# Patient Record
Sex: MALE | Race: WHITE | NOT HISPANIC OR LATINO | URBAN - METROPOLITAN AREA
[De-identification: names, ages, dates, MRNs, and addresses within clinical notes are randomized per-mention and may not be internally consistent; named-entity substitution may affect disease eponyms.]

---

## 2021-10-18 ENCOUNTER — INPATIENT (INPATIENT)
Facility: HOSPITAL | Age: 81
LOS: 6 days | Discharge: HOME HEALTH | DRG: 872 | End: 2021-10-25
Attending: FAMILY MEDICINE | Admitting: FAMILY MEDICINE
Payer: MEDICARE

## 2021-10-18 VITALS
HEIGHT: 69 IN | TEMPERATURE: 100 F | SYSTOLIC BLOOD PRESSURE: 127 MMHG | RESPIRATION RATE: 18 BRPM | HEART RATE: 94 BPM | DIASTOLIC BLOOD PRESSURE: 79 MMHG | OXYGEN SATURATION: 98 % | WEIGHT: 169.98 LBS

## 2021-10-18 LAB
ALBUMIN SERPL ELPH-MCNC: 3.5 G/DL — SIGNIFICANT CHANGE UP (ref 3.4–5)
ALP SERPL-CCNC: 65 U/L — SIGNIFICANT CHANGE UP (ref 40–120)
ALT FLD-CCNC: 23 U/L — SIGNIFICANT CHANGE UP (ref 12–42)
ANION GAP SERPL CALC-SCNC: 7 MMOL/L — LOW (ref 9–16)
APPEARANCE UR: CLEAR — SIGNIFICANT CHANGE UP
APTT BLD: 37.4 SEC — HIGH (ref 27.5–35.5)
AST SERPL-CCNC: 19 U/L — SIGNIFICANT CHANGE UP (ref 15–37)
B PERT IGG+IGM PNL SER: SIGNIFICANT CHANGE UP
BACTERIA # UR AUTO: PRESENT /HPF
BASOPHILS # BLD AUTO: 0.03 K/UL — SIGNIFICANT CHANGE UP (ref 0–0.2)
BASOPHILS NFR BLD AUTO: 0.3 % — SIGNIFICANT CHANGE UP (ref 0–2)
BILIRUB SERPL-MCNC: 1.5 MG/DL — HIGH (ref 0.2–1.2)
BILIRUB UR-MCNC: NEGATIVE — SIGNIFICANT CHANGE UP
BUN SERPL-MCNC: 25 MG/DL — HIGH (ref 7–23)
CALCIUM SERPL-MCNC: 9.4 MG/DL — SIGNIFICANT CHANGE UP (ref 8.5–10.5)
CHLORIDE SERPL-SCNC: 104 MMOL/L — SIGNIFICANT CHANGE UP (ref 96–108)
CK SERPL-CCNC: 53 U/L — SIGNIFICANT CHANGE UP (ref 39–308)
CO2 SERPL-SCNC: 27 MMOL/L — SIGNIFICANT CHANGE UP (ref 22–31)
COLOR FLD: YELLOW — SIGNIFICANT CHANGE UP
COLOR SPEC: YELLOW — SIGNIFICANT CHANGE UP
CREAT SERPL-MCNC: 1.5 MG/DL — HIGH (ref 0.5–1.3)
CRP SERPL-MCNC: 120 MG/L — HIGH (ref 0–9)
DIFF PNL FLD: NEGATIVE — SIGNIFICANT CHANGE UP
EOSINOPHIL # BLD AUTO: 0.08 K/UL — SIGNIFICANT CHANGE UP (ref 0–0.5)
EOSINOPHIL NFR BLD AUTO: 0.7 % — SIGNIFICANT CHANGE UP (ref 0–6)
EPI CELLS # UR: SIGNIFICANT CHANGE UP /HPF (ref 0–5)
FLUID INTAKE SUBSTANCE CLASS: SIGNIFICANT CHANGE UP
FLUID SEGMENTED GRANULOCYTES: 100 % — SIGNIFICANT CHANGE UP
GLUCOSE SERPL-MCNC: 113 MG/DL — HIGH (ref 70–99)
GLUCOSE UR QL: NEGATIVE — SIGNIFICANT CHANGE UP
GRAM STN FLD: SIGNIFICANT CHANGE UP
HCT VFR BLD CALC: 30.3 % — LOW (ref 39–50)
HGB BLD-MCNC: 10 G/DL — LOW (ref 13–17)
IMM GRANULOCYTES NFR BLD AUTO: 0.6 % — SIGNIFICANT CHANGE UP (ref 0–1.5)
INR BLD: 1.8 — HIGH (ref 0.88–1.16)
KETONES UR-MCNC: NEGATIVE — SIGNIFICANT CHANGE UP
LACTATE SERPL-SCNC: 0.9 MMOL/L — SIGNIFICANT CHANGE UP (ref 0.4–2)
LEUKOCYTE ESTERASE UR-ACNC: NEGATIVE — SIGNIFICANT CHANGE UP
LYMPHOCYTES # BLD AUTO: 0.55 K/UL — LOW (ref 1–3.3)
LYMPHOCYTES # BLD AUTO: 5 % — LOW (ref 13–44)
MAGNESIUM SERPL-MCNC: 2 MG/DL — SIGNIFICANT CHANGE UP (ref 1.6–2.6)
MCHC RBC-ENTMCNC: 29 PG — SIGNIFICANT CHANGE UP (ref 27–34)
MCHC RBC-ENTMCNC: 33 GM/DL — SIGNIFICANT CHANGE UP (ref 32–36)
MCV RBC AUTO: 87.8 FL — SIGNIFICANT CHANGE UP (ref 80–100)
MONOCYTES # BLD AUTO: 0.71 K/UL — SIGNIFICANT CHANGE UP (ref 0–0.9)
MONOCYTES NFR BLD AUTO: 6.5 % — SIGNIFICANT CHANGE UP (ref 2–14)
NEUTROPHILS # BLD AUTO: 9.48 K/UL — HIGH (ref 1.8–7.4)
NEUTROPHILS NFR BLD AUTO: 86.9 % — HIGH (ref 43–77)
NITRITE UR-MCNC: NEGATIVE — SIGNIFICANT CHANGE UP
NRBC # BLD: 0 /100 WBCS — SIGNIFICANT CHANGE UP (ref 0–0)
PH UR: 7 — SIGNIFICANT CHANGE UP (ref 5–8)
PLATELET # BLD AUTO: 147 K/UL — LOW (ref 150–400)
POTASSIUM SERPL-MCNC: 3.8 MMOL/L — SIGNIFICANT CHANGE UP (ref 3.5–5.3)
POTASSIUM SERPL-SCNC: 3.8 MMOL/L — SIGNIFICANT CHANGE UP (ref 3.5–5.3)
PROT SERPL-MCNC: 7.7 G/DL — SIGNIFICANT CHANGE UP (ref 6.4–8.2)
PROT UR-MCNC: ABNORMAL MG/DL
PROTHROM AB SERPL-ACNC: 21 SEC — HIGH (ref 10.6–13.6)
RBC # BLD: 3.45 M/UL — LOW (ref 4.2–5.8)
RBC # FLD: 15.7 % — HIGH (ref 10.3–14.5)
RBC CASTS # UR COMP ASSIST: < 5 /HPF — SIGNIFICANT CHANGE UP
RCV VOL RI: 1000 /UL — HIGH (ref 0–0)
SARS-COV-2 RNA SPEC QL NAA+PROBE: SIGNIFICANT CHANGE UP
SODIUM SERPL-SCNC: 138 MMOL/L — SIGNIFICANT CHANGE UP (ref 132–145)
SP GR SPEC: 1.01 — SIGNIFICANT CHANGE UP (ref 1–1.03)
SPECIMEN SOURCE: SIGNIFICANT CHANGE UP
SYNOVIAL CRYSTALS CLARITY: SIGNIFICANT CHANGE UP
SYNOVIAL CRYSTALS COLOR: YELLOW
SYNOVIAL CRYSTALS ID: SIGNIFICANT CHANGE UP
SYNOVIAL CRYSTALS TUBE: SIGNIFICANT CHANGE UP
TOTAL NUCLEATED CELL COUNT, BODY FLUID: SIGNIFICANT CHANGE UP /UL
TUBE TYPE: SIGNIFICANT CHANGE UP
UROBILINOGEN FLD QL: 0.2 E.U./DL — SIGNIFICANT CHANGE UP
WBC # BLD: 10.92 K/UL — HIGH (ref 3.8–10.5)
WBC # FLD AUTO: 10.92 K/UL — HIGH (ref 3.8–10.5)
WBC UR QL: < 5 /HPF — SIGNIFICANT CHANGE UP

## 2021-10-18 PROCEDURE — 99285 EMERGENCY DEPT VISIT HI MDM: CPT | Mod: 25

## 2021-10-18 PROCEDURE — 71045 X-RAY EXAM CHEST 1 VIEW: CPT | Mod: 26

## 2021-10-18 PROCEDURE — 20610 DRAIN/INJ JOINT/BURSA W/O US: CPT

## 2021-10-18 PROCEDURE — 93971 EXTREMITY STUDY: CPT | Mod: 26,RT

## 2021-10-18 PROCEDURE — 73560 X-RAY EXAM OF KNEE 1 OR 2: CPT | Mod: 26,RT

## 2021-10-18 RX ORDER — SODIUM CHLORIDE 9 MG/ML
2100 INJECTION INTRAMUSCULAR; INTRAVENOUS; SUBCUTANEOUS ONCE
Refills: 0 | Status: COMPLETED | OUTPATIENT
Start: 2021-10-18 | End: 2021-10-18

## 2021-10-18 RX ORDER — ACETAMINOPHEN 500 MG
975 TABLET ORAL ONCE
Refills: 0 | Status: COMPLETED | OUTPATIENT
Start: 2021-10-18 | End: 2021-10-18

## 2021-10-18 RX ORDER — PIPERACILLIN AND TAZOBACTAM 4; .5 G/20ML; G/20ML
3.38 INJECTION, POWDER, LYOPHILIZED, FOR SOLUTION INTRAVENOUS ONCE
Refills: 0 | Status: COMPLETED | OUTPATIENT
Start: 2021-10-18 | End: 2021-10-18

## 2021-10-18 RX ORDER — MORPHINE SULFATE 50 MG/1
2 CAPSULE, EXTENDED RELEASE ORAL ONCE
Refills: 0 | Status: DISCONTINUED | OUTPATIENT
Start: 2021-10-18 | End: 2021-10-18

## 2021-10-18 RX ORDER — WARFARIN SODIUM 2.5 MG/1
5 TABLET ORAL
Qty: 0 | Refills: 0 | DISCHARGE

## 2021-10-18 RX ADMIN — MORPHINE SULFATE 2 MILLIGRAM(S): 50 CAPSULE, EXTENDED RELEASE ORAL at 23:32

## 2021-10-18 RX ADMIN — SODIUM CHLORIDE 525 MILLILITER(S): 9 INJECTION INTRAMUSCULAR; INTRAVENOUS; SUBCUTANEOUS at 15:49

## 2021-10-18 RX ADMIN — MORPHINE SULFATE 2 MILLIGRAM(S): 50 CAPSULE, EXTENDED RELEASE ORAL at 15:49

## 2021-10-18 RX ADMIN — PIPERACILLIN AND TAZOBACTAM 200 GRAM(S): 4; .5 INJECTION, POWDER, LYOPHILIZED, FOR SOLUTION INTRAVENOUS at 15:47

## 2021-10-18 RX ADMIN — Medication 975 MILLIGRAM(S): at 15:46

## 2021-10-18 NOTE — ED PROVIDER NOTE - PHYSICAL EXAMINATION
Physical Exam  GEN: Awake, alert, non-toxic appearing,  EYES: full EOMI, R eye lateral gaze deviation at rest, no chemosis   ENT: External inspection normal, normal voice,   HEAD: atraumatic  NECK: FROM neck, supple,   CV: rrr  RESP: cta bl, no tachypnea, no hypoxia, no resp distress,  MSK: joint effusion noted to R knee, reduced a/p ROM to R knee, soft compartment of RLE, no calf tenderness/swelling  SKIN: no erythema/petechiae/fluctuance/induration over RLE,

## 2021-10-18 NOTE — ED ADULT NURSE NOTE - OBJECTIVE STATEMENT
Patient to ED  "feeling cold" since yesterday, and noted right leg pain today.  pt reports he exercises both of his legs several times a week (described as step up to a height, then stand on toes for 50 seconds), he had noticed increased leg pain after exercising recently but has not changed his regimen.  triage noted urinary incontinence but pt reports he was not able to make it to the bathroom due to leg pain today

## 2021-10-18 NOTE — ED PROVIDER NOTE - CLINICAL SUMMARY MEDICAL DECISION MAKING FREE TEXT BOX
fever 101.8, unable to range R knee w/ pain, no overlying cellulitis, no other obvious sources of infection based on history, will check rvp, US r/o DVT, labs, abx, though pt w/ hx of gout, given fever and significantly limited ROM, will perform arthrocentesis to r/o septic joint

## 2021-10-18 NOTE — ED PROVIDER NOTE - PROGRESS NOTE DETAILS
noted elevated INR, platelet minimally decreased, I reviewed labs and case w/ ortho on call, rec'd ED to perform arthrocentesis to r/o septic joint US guided arthrocentesis performed.  spoke w/ lab, ok to send to lab in syringe collected.  specimen dropped off at lab myself.  requested stat pickup to send to [facility] pt resting comfortably.  pending labs to r/o septic joint called [facility] to discuss case w/ orthopedics case discussed w/ orthopedics, labs reviewed, does not believe pt has septic joint.    will admit pt to medicine for pain/fever workup, can re-consult orthopedics as needed

## 2021-10-18 NOTE — ED PROVIDER NOTE - CARE PLAN
1 Principal Discharge DX:	Fever  Secondary Diagnosis:	Knee effusion, right  Secondary Diagnosis:	Right knee pain

## 2021-10-18 NOTE — ED ADULT TRIAGE NOTE - CHIEF COMPLAINT QUOTE
BIBA from [facility] with c/o right knee pain, inability to bear weight and episodes of urinary incontinence. Pt has mobility issues at baseline, no fall or associated injury. mild fever in triage.

## 2021-10-18 NOTE — ED ADULT NURSE NOTE - CHIEF COMPLAINT QUOTE
BIBA from [facility] with c/o right knee pain, inability to bear weight and episodes of urinary incontinence. Pt has mobility issues at baseline, no fall or associated injury. mild fever in triage.

## 2021-10-18 NOTE — ED PROVIDER NOTE - OBJECTIVE STATEMENT
81 yom pw "feeling cold" since yesterday, and noted right leg pain today.  pt reports he exercises both of his legs several times a week (described as step up to a height, then stand on toes for 50 seconds), he had noticed increased leg pain after exercising recently but has not changed his regimen.  triage noted urinary incontinence but pt reports he was not able to make it to the bathroom due to leg pain today.  pt denies any other strenuous physical activities.  no other trauma.  no cough/sob/nv/abd pain/fall. 81 yom pw "feeling cold" since yesterday, and noted right leg pain today.  pt reports he exercises both of his legs several times a week (described as step up to a height, then stand on toes for 50 seconds), he had noticed increased leg pain after exercising recently but has not changed his regimen.  triage noted urinary incontinence but pt reports he was not able to make it to the bathroom due to leg pain today.  pt denies any other strenuous physical activities.  no other trauma.  no cough/sob/nv/abd pain/fall.  pt reports hx of gout but has ran out of his medications for a few weeks.  reports previous gout attacks in various joints but unable to recall exactly where/when.

## 2021-10-18 NOTE — ED PROCEDURE NOTE - PROCEDURE ADDITIONAL DETAILS
Sterile technique used  US used to visualize the largest pocket of effusion  18gauge needle inserted w/ aspiration    after procedure ace wrap applied to right knee

## 2021-10-19 ENCOUNTER — TRANSCRIPTION ENCOUNTER (OUTPATIENT)
Age: 81
End: 2021-10-19

## 2021-10-19 DIAGNOSIS — R65.10 SYSTEMIC INFLAMMATORY RESPONSE SYNDROME (SIRS) OF NON-INFECTIOUS ORIGIN WITHOUT ACUTE ORGAN DYSFUNCTION: ICD-10-CM

## 2021-10-19 DIAGNOSIS — D64.9 ANEMIA, UNSPECIFIED: ICD-10-CM

## 2021-10-19 DIAGNOSIS — E78.5 HYPERLIPIDEMIA, UNSPECIFIED: ICD-10-CM

## 2021-10-19 DIAGNOSIS — I48.91 UNSPECIFIED ATRIAL FIBRILLATION: ICD-10-CM

## 2021-10-19 DIAGNOSIS — Z95.2 PRESENCE OF PROSTHETIC HEART VALVE: ICD-10-CM

## 2021-10-19 DIAGNOSIS — Z90.5 ACQUIRED ABSENCE OF KIDNEY: Chronic | ICD-10-CM

## 2021-10-19 DIAGNOSIS — R17 UNSPECIFIED JAUNDICE: ICD-10-CM

## 2021-10-19 DIAGNOSIS — Z76.89 PERSONS ENCOUNTERING HEALTH SERVICES IN OTHER SPECIFIED CIRCUMSTANCES: ICD-10-CM

## 2021-10-19 DIAGNOSIS — Z98.890 OTHER SPECIFIED POSTPROCEDURAL STATES: Chronic | ICD-10-CM

## 2021-10-19 DIAGNOSIS — R63.8 OTHER SYMPTOMS AND SIGNS CONCERNING FOOD AND FLUID INTAKE: ICD-10-CM

## 2021-10-19 DIAGNOSIS — F32.9 MAJOR DEPRESSIVE DISORDER, SINGLE EPISODE, UNSPECIFIED: ICD-10-CM

## 2021-10-19 DIAGNOSIS — M10.9 GOUT, UNSPECIFIED: ICD-10-CM

## 2021-10-19 DIAGNOSIS — M25.561 PAIN IN RIGHT KNEE: ICD-10-CM

## 2021-10-19 DIAGNOSIS — N40.0 BENIGN PROSTATIC HYPERPLASIA WITHOUT LOWER URINARY TRACT SYMPTOMS: ICD-10-CM

## 2021-10-19 DIAGNOSIS — R79.89 OTHER SPECIFIED ABNORMAL FINDINGS OF BLOOD CHEMISTRY: ICD-10-CM

## 2021-10-19 DIAGNOSIS — I10 ESSENTIAL (PRIMARY) HYPERTENSION: ICD-10-CM

## 2021-10-19 LAB
ALBUMIN SERPL ELPH-MCNC: 3.7 G/DL — SIGNIFICANT CHANGE UP (ref 3.3–5)
ALP SERPL-CCNC: 56 U/L — SIGNIFICANT CHANGE UP (ref 40–120)
ALT FLD-CCNC: 12 U/L — SIGNIFICANT CHANGE UP (ref 10–45)
ANION GAP SERPL CALC-SCNC: 10 MMOL/L — SIGNIFICANT CHANGE UP (ref 5–17)
ANISOCYTOSIS BLD QL: SLIGHT — SIGNIFICANT CHANGE UP
APTT BLD: 73.2 SEC — HIGH (ref 27.5–35.5)
APTT BLD: 78 SEC — HIGH (ref 27.5–35.5)
AST SERPL-CCNC: 21 U/L — SIGNIFICANT CHANGE UP (ref 10–40)
BASOPHILS # BLD AUTO: 0 K/UL — SIGNIFICANT CHANGE UP (ref 0–0.2)
BASOPHILS NFR BLD AUTO: 0 % — SIGNIFICANT CHANGE UP (ref 0–2)
BILIRUB DIRECT SERPL-MCNC: 0.5 MG/DL — HIGH (ref 0–0.2)
BILIRUB INDIRECT FLD-MCNC: 0.7 MG/DL — SIGNIFICANT CHANGE UP (ref 0.2–1)
BILIRUB SERPL-MCNC: 1.2 MG/DL — SIGNIFICANT CHANGE UP (ref 0.2–1.2)
BILIRUB SERPL-MCNC: 1.2 MG/DL — SIGNIFICANT CHANGE UP (ref 0.2–1.2)
BLD GP AB SCN SERPL QL: NEGATIVE — SIGNIFICANT CHANGE UP
BUN SERPL-MCNC: 22 MG/DL — SIGNIFICANT CHANGE UP (ref 7–23)
CALCIUM SERPL-MCNC: 9 MG/DL — SIGNIFICANT CHANGE UP (ref 8.4–10.5)
CHLORIDE SERPL-SCNC: 104 MMOL/L — SIGNIFICANT CHANGE UP (ref 96–108)
CHOLEST SERPL-MCNC: 104 MG/DL — SIGNIFICANT CHANGE UP
CO2 SERPL-SCNC: 22 MMOL/L — SIGNIFICANT CHANGE UP (ref 22–31)
COVID-19 SPIKE DOMAIN AB INTERP: POSITIVE
COVID-19 SPIKE DOMAIN ANTIBODY RESULT: >250 U/ML — HIGH
CREAT SERPL-MCNC: 1.38 MG/DL — HIGH (ref 0.5–1.3)
CULTURE RESULTS: SIGNIFICANT CHANGE UP
DIGOXIN SERPL-MCNC: 0.9 NG/ML — SIGNIFICANT CHANGE UP (ref 0.8–2)
EOSINOPHIL # BLD AUTO: 0.17 K/UL — SIGNIFICANT CHANGE UP (ref 0–0.5)
EOSINOPHIL NFR BLD AUTO: 1.8 % — SIGNIFICANT CHANGE UP (ref 0–6)
ERYTHROCYTE [SEDIMENTATION RATE] IN BLOOD: 61 MM/HR — HIGH (ref 0–20)
FERRITIN SERPL-MCNC: 122 NG/ML — SIGNIFICANT CHANGE UP (ref 30–400)
FOLATE SERPL-MCNC: 10.9 NG/ML — SIGNIFICANT CHANGE UP
GIANT PLATELETS BLD QL SMEAR: PRESENT — SIGNIFICANT CHANGE UP
GLUCOSE SERPL-MCNC: 140 MG/DL — HIGH (ref 70–99)
HAV IGM SER-ACNC: SIGNIFICANT CHANGE UP
HBV CORE AB SER-ACNC: SIGNIFICANT CHANGE UP
HBV CORE IGM SER-ACNC: SIGNIFICANT CHANGE UP
HBV SURFACE AB SER-ACNC: SIGNIFICANT CHANGE UP
HBV SURFACE AG SER-ACNC: SIGNIFICANT CHANGE UP
HCT VFR BLD CALC: 30.2 % — LOW (ref 39–50)
HCV AB S/CO SERPL IA: 0.04 S/CO — SIGNIFICANT CHANGE UP
HCV AB SERPL-IMP: SIGNIFICANT CHANGE UP
HDLC SERPL-MCNC: 42 MG/DL — SIGNIFICANT CHANGE UP
HGB BLD-MCNC: 9.5 G/DL — LOW (ref 13–17)
HIV 1+2 AB+HIV1 P24 AG SERPL QL IA: SIGNIFICANT CHANGE UP
HYPOCHROMIA BLD QL: SLIGHT — SIGNIFICANT CHANGE UP
INR BLD: 1.67 — HIGH (ref 0.88–1.16)
IRON SATN MFR SERPL: 20 UG/DL — LOW (ref 45–165)
IRON SATN MFR SERPL: 6 % — LOW (ref 16–55)
LIPID PNL WITH DIRECT LDL SERPL: 47 MG/DL — SIGNIFICANT CHANGE UP
LYMPHOCYTES # BLD AUTO: 0.25 K/UL — LOW (ref 1–3.3)
LYMPHOCYTES # BLD AUTO: 2.6 % — LOW (ref 13–44)
MACROCYTES BLD QL: SLIGHT — SIGNIFICANT CHANGE UP
MAGNESIUM SERPL-MCNC: 1.8 MG/DL — SIGNIFICANT CHANGE UP (ref 1.6–2.6)
MANUAL SMEAR VERIFICATION: SIGNIFICANT CHANGE UP
MCHC RBC-ENTMCNC: 27.9 PG — SIGNIFICANT CHANGE UP (ref 27–34)
MCHC RBC-ENTMCNC: 31.5 GM/DL — LOW (ref 32–36)
MCV RBC AUTO: 88.6 FL — SIGNIFICANT CHANGE UP (ref 80–100)
MICROCYTES BLD QL: SLIGHT — SIGNIFICANT CHANGE UP
MONOCYTES # BLD AUTO: 0.17 K/UL — SIGNIFICANT CHANGE UP (ref 0–0.9)
MONOCYTES NFR BLD AUTO: 1.8 % — LOW (ref 2–14)
NEUTROPHILS # BLD AUTO: 8.97 K/UL — HIGH (ref 1.8–7.4)
NEUTROPHILS NFR BLD AUTO: 93.8 % — HIGH (ref 43–77)
NON HDL CHOLESTEROL: 62 MG/DL — SIGNIFICANT CHANGE UP
NT-PROBNP SERPL-SCNC: 1861 PG/ML — HIGH (ref 0–300)
OVALOCYTES BLD QL SMEAR: SLIGHT — SIGNIFICANT CHANGE UP
PHOSPHATE SERPL-MCNC: 2.4 MG/DL — LOW (ref 2.5–4.5)
PLAT MORPH BLD: ABNORMAL
PLATELET # BLD AUTO: 145 K/UL — LOW (ref 150–400)
POLYCHROMASIA BLD QL SMEAR: SLIGHT — SIGNIFICANT CHANGE UP
POTASSIUM SERPL-MCNC: 3.7 MMOL/L — SIGNIFICANT CHANGE UP (ref 3.5–5.3)
POTASSIUM SERPL-SCNC: 3.7 MMOL/L — SIGNIFICANT CHANGE UP (ref 3.5–5.3)
PROT SERPL-MCNC: 7.4 G/DL — SIGNIFICANT CHANGE UP (ref 6–8.3)
PROTHROM AB SERPL-ACNC: 19.6 SEC — HIGH (ref 10.6–13.6)
RBC # BLD: 3.41 M/UL — LOW (ref 4.2–5.8)
RBC # FLD: 15.8 % — HIGH (ref 10.3–14.5)
RBC BLD AUTO: ABNORMAL
RH IG SCN BLD-IMP: POSITIVE — SIGNIFICANT CHANGE UP
SARS-COV-2 IGG+IGM SERPL QL IA: >250 U/ML — HIGH
SARS-COV-2 IGG+IGM SERPL QL IA: POSITIVE
SODIUM SERPL-SCNC: 136 MMOL/L — SIGNIFICANT CHANGE UP (ref 135–145)
SPECIMEN SOURCE: SIGNIFICANT CHANGE UP
SPHEROCYTES BLD QL SMEAR: SLIGHT — SIGNIFICANT CHANGE UP
T PALLIDUM AB TITR SER: NEGATIVE — SIGNIFICANT CHANGE UP
TIBC SERPL-MCNC: 361 UG/DL — SIGNIFICANT CHANGE UP (ref 220–430)
TRANSFERRIN SERPL-MCNC: 301 MG/DL — SIGNIFICANT CHANGE UP (ref 200–360)
TRIGL SERPL-MCNC: 76 MG/DL — SIGNIFICANT CHANGE UP
TROPONIN T SERPL-MCNC: 0.02 NG/ML — HIGH (ref 0–0.01)
TROPONIN T SERPL-MCNC: 0.02 NG/ML — HIGH (ref 0–0.01)
TSH SERPL-MCNC: 0.99 UIU/ML — SIGNIFICANT CHANGE UP (ref 0.27–4.2)
UIBC SERPL-MCNC: 341 UG/DL — SIGNIFICANT CHANGE UP (ref 110–370)
URATE SERPL-MCNC: 6.4 MG/DL — SIGNIFICANT CHANGE UP (ref 3.4–8.8)
VIT B12 SERPL-MCNC: 281 PG/ML — SIGNIFICANT CHANGE UP (ref 232–1245)
WBC # BLD: 9.56 K/UL — SIGNIFICANT CHANGE UP (ref 3.8–10.5)
WBC # FLD AUTO: 9.56 K/UL — SIGNIFICANT CHANGE UP (ref 3.8–10.5)

## 2021-10-19 PROCEDURE — 71045 X-RAY EXAM CHEST 1 VIEW: CPT | Mod: 26

## 2021-10-19 PROCEDURE — 93010 ELECTROCARDIOGRAM REPORT: CPT

## 2021-10-19 PROCEDURE — 99222 1ST HOSP IP/OBS MODERATE 55: CPT

## 2021-10-19 PROCEDURE — 76700 US EXAM ABDOM COMPLETE: CPT | Mod: 26

## 2021-10-19 PROCEDURE — 99223 1ST HOSP IP/OBS HIGH 75: CPT | Mod: GC

## 2021-10-19 RX ORDER — VANCOMYCIN HCL 1 G
1000 VIAL (EA) INTRAVENOUS EVERY 24 HOURS
Refills: 0 | Status: COMPLETED | OUTPATIENT
Start: 2021-10-19 | End: 2021-10-21

## 2021-10-19 RX ORDER — PIPERACILLIN AND TAZOBACTAM 4; .5 G/20ML; G/20ML
3.38 INJECTION, POWDER, LYOPHILIZED, FOR SOLUTION INTRAVENOUS ONCE
Refills: 0 | Status: COMPLETED | OUTPATIENT
Start: 2021-10-19 | End: 2021-10-19

## 2021-10-19 RX ORDER — HEPARIN SODIUM 5000 [USP'U]/ML
1400 INJECTION INTRAVENOUS; SUBCUTANEOUS
Qty: 25000 | Refills: 0 | Status: DISCONTINUED | OUTPATIENT
Start: 2021-10-19 | End: 2021-10-22

## 2021-10-19 RX ORDER — SODIUM,POTASSIUM PHOSPHATES 278-250MG
1 POWDER IN PACKET (EA) ORAL
Refills: 0 | Status: COMPLETED | OUTPATIENT
Start: 2021-10-19 | End: 2021-10-20

## 2021-10-19 RX ORDER — IPRATROPIUM/ALBUTEROL SULFATE 18-103MCG
3 AEROSOL WITH ADAPTER (GRAM) INHALATION ONCE
Refills: 0 | Status: COMPLETED | OUTPATIENT
Start: 2021-10-19 | End: 2021-10-19

## 2021-10-19 RX ORDER — CARVEDILOL PHOSPHATE 80 MG/1
6.25 CAPSULE, EXTENDED RELEASE ORAL EVERY 12 HOURS
Refills: 0 | Status: DISCONTINUED | OUTPATIENT
Start: 2021-10-19 | End: 2021-10-19

## 2021-10-19 RX ORDER — SIMVASTATIN 20 MG/1
10 TABLET, FILM COATED ORAL AT BEDTIME
Refills: 0 | Status: DISCONTINUED | OUTPATIENT
Start: 2021-10-19 | End: 2021-10-25

## 2021-10-19 RX ORDER — ACETAMINOPHEN 500 MG
650 TABLET ORAL ONCE
Refills: 0 | Status: COMPLETED | OUTPATIENT
Start: 2021-10-19 | End: 2021-10-19

## 2021-10-19 RX ORDER — TAMSULOSIN HYDROCHLORIDE 0.4 MG/1
0.4 CAPSULE ORAL AT BEDTIME
Refills: 0 | Status: DISCONTINUED | OUTPATIENT
Start: 2021-10-19 | End: 2021-10-25

## 2021-10-19 RX ORDER — POLYETHYLENE GLYCOL 3350 17 G/17G
17 POWDER, FOR SOLUTION ORAL
Refills: 0 | Status: DISCONTINUED | OUTPATIENT
Start: 2021-10-19 | End: 2021-10-25

## 2021-10-19 RX ORDER — ACETAMINOPHEN 500 MG
650 TABLET ORAL EVERY 6 HOURS
Refills: 0 | Status: DISCONTINUED | OUTPATIENT
Start: 2021-10-19 | End: 2021-10-25

## 2021-10-19 RX ORDER — BUPROPION HYDROCHLORIDE 150 MG/1
300 TABLET, EXTENDED RELEASE ORAL DAILY
Refills: 0 | Status: DISCONTINUED | OUTPATIENT
Start: 2021-10-19 | End: 2021-10-25

## 2021-10-19 RX ORDER — FUROSEMIDE 40 MG
1 TABLET ORAL
Qty: 0 | Refills: 0 | DISCHARGE

## 2021-10-19 RX ORDER — HEPARIN SODIUM 5000 [USP'U]/ML
6500 INJECTION INTRAVENOUS; SUBCUTANEOUS ONCE
Refills: 0 | Status: COMPLETED | OUTPATIENT
Start: 2021-10-19 | End: 2021-10-19

## 2021-10-19 RX ORDER — OXYCODONE HYDROCHLORIDE 5 MG/1
5 TABLET ORAL EVERY 4 HOURS
Refills: 0 | Status: DISCONTINUED | OUTPATIENT
Start: 2021-10-19 | End: 2021-10-25

## 2021-10-19 RX ORDER — DIPHENHYDRAMINE HCL 50 MG
25 CAPSULE ORAL ONCE
Refills: 0 | Status: COMPLETED | OUTPATIENT
Start: 2021-10-19 | End: 2021-10-19

## 2021-10-19 RX ORDER — INFLUENZA VIRUS VACCINE 15; 15; 15; 15 UG/.5ML; UG/.5ML; UG/.5ML; UG/.5ML
0.7 SUSPENSION INTRAMUSCULAR ONCE
Refills: 0 | Status: COMPLETED | OUTPATIENT
Start: 2021-10-19 | End: 2021-10-22

## 2021-10-19 RX ORDER — FENOFIBRATE,MICRONIZED 130 MG
145 CAPSULE ORAL DAILY
Refills: 0 | Status: DISCONTINUED | OUTPATIENT
Start: 2021-10-19 | End: 2021-10-25

## 2021-10-19 RX ORDER — ALLOPURINOL 300 MG
300 TABLET ORAL DAILY
Refills: 0 | Status: DISCONTINUED | OUTPATIENT
Start: 2021-10-19 | End: 2021-10-19

## 2021-10-19 RX ORDER — FUROSEMIDE 40 MG
40 TABLET ORAL ONCE
Refills: 0 | Status: COMPLETED | OUTPATIENT
Start: 2021-10-19 | End: 2021-10-19

## 2021-10-19 RX ORDER — POLYETHYLENE GLYCOL 3350 17 G/17G
527 POWDER, FOR SOLUTION ORAL
Qty: 0 | Refills: 0 | DISCHARGE

## 2021-10-19 RX ORDER — PIPERACILLIN AND TAZOBACTAM 4; .5 G/20ML; G/20ML
3.38 INJECTION, POWDER, LYOPHILIZED, FOR SOLUTION INTRAVENOUS EVERY 6 HOURS
Refills: 0 | Status: DISCONTINUED | OUTPATIENT
Start: 2021-10-19 | End: 2021-10-20

## 2021-10-19 RX ORDER — ALLOPURINOL 300 MG
50 TABLET ORAL DAILY
Refills: 0 | Status: DISCONTINUED | OUTPATIENT
Start: 2021-10-19 | End: 2021-10-25

## 2021-10-19 RX ORDER — DIGOXIN 250 MCG
125 TABLET ORAL DAILY
Refills: 0 | Status: DISCONTINUED | OUTPATIENT
Start: 2021-10-19 | End: 2021-10-25

## 2021-10-19 RX ORDER — WARFARIN SODIUM 2.5 MG/1
5 TABLET ORAL ONCE
Refills: 0 | Status: DISCONTINUED | OUTPATIENT
Start: 2021-10-19 | End: 2021-10-19

## 2021-10-19 RX ORDER — TRAMADOL HYDROCHLORIDE 50 MG/1
25 TABLET ORAL EVERY 4 HOURS
Refills: 0 | Status: DISCONTINUED | OUTPATIENT
Start: 2021-10-19 | End: 2021-10-25

## 2021-10-19 RX ORDER — ALLOPURINOL 300 MG
1 TABLET ORAL
Qty: 0 | Refills: 0 | DISCHARGE

## 2021-10-19 RX ORDER — INFLUENZA VIRUS VACCINE 15; 15; 15; 15 UG/.5ML; UG/.5ML; UG/.5ML; UG/.5ML
0.5 SUSPENSION INTRAMUSCULAR ONCE
Refills: 0 | Status: DISCONTINUED | OUTPATIENT
Start: 2021-10-19 | End: 2021-10-19

## 2021-10-19 RX ORDER — SENNA PLUS 8.6 MG/1
2 TABLET ORAL AT BEDTIME
Refills: 0 | Status: DISCONTINUED | OUTPATIENT
Start: 2021-10-19 | End: 2021-10-25

## 2021-10-19 RX ADMIN — Medication 145 MILLIGRAM(S): at 13:53

## 2021-10-19 RX ADMIN — Medication 650 MILLIGRAM(S): at 06:52

## 2021-10-19 RX ADMIN — PIPERACILLIN AND TAZOBACTAM 200 GRAM(S): 4; .5 INJECTION, POWDER, LYOPHILIZED, FOR SOLUTION INTRAVENOUS at 13:51

## 2021-10-19 RX ADMIN — OXYCODONE HYDROCHLORIDE 5 MILLIGRAM(S): 5 TABLET ORAL at 06:47

## 2021-10-19 RX ADMIN — PIPERACILLIN AND TAZOBACTAM 200 GRAM(S): 4; .5 INJECTION, POWDER, LYOPHILIZED, FOR SOLUTION INTRAVENOUS at 06:50

## 2021-10-19 RX ADMIN — Medication 650 MILLIGRAM(S): at 13:56

## 2021-10-19 RX ADMIN — Medication 40 MILLIGRAM(S): at 06:50

## 2021-10-19 RX ADMIN — Medication 25 MILLIGRAM(S): at 13:53

## 2021-10-19 RX ADMIN — BUPROPION HYDROCHLORIDE 300 MILLIGRAM(S): 150 TABLET, EXTENDED RELEASE ORAL at 13:52

## 2021-10-19 RX ADMIN — Medication 1 PACKET(S): at 18:43

## 2021-10-19 RX ADMIN — HEPARIN SODIUM 6500 UNIT(S): 5000 INJECTION INTRAVENOUS; SUBCUTANEOUS at 08:00

## 2021-10-19 RX ADMIN — Medication 125 MICROGRAM(S): at 06:48

## 2021-10-19 RX ADMIN — Medication 3 MILLILITER(S): at 14:13

## 2021-10-19 RX ADMIN — Medication 650 MILLIGRAM(S): at 13:52

## 2021-10-19 RX ADMIN — Medication 50 MILLIGRAM(S): at 13:54

## 2021-10-19 RX ADMIN — Medication 650 MILLIGRAM(S): at 08:30

## 2021-10-19 RX ADMIN — TAMSULOSIN HYDROCHLORIDE 0.4 MILLIGRAM(S): 0.4 CAPSULE ORAL at 22:47

## 2021-10-19 RX ADMIN — SIMVASTATIN 10 MILLIGRAM(S): 20 TABLET, FILM COATED ORAL at 22:47

## 2021-10-19 RX ADMIN — Medication 3 MILLILITER(S): at 06:50

## 2021-10-19 RX ADMIN — Medication 250 MILLIGRAM(S): at 09:40

## 2021-10-19 RX ADMIN — Medication 3 MILLILITER(S): at 22:03

## 2021-10-19 RX ADMIN — SENNA PLUS 2 TABLET(S): 8.6 TABLET ORAL at 22:47

## 2021-10-19 RX ADMIN — POLYETHYLENE GLYCOL 3350 17 GRAM(S): 17 POWDER, FOR SOLUTION ORAL at 06:48

## 2021-10-19 RX ADMIN — HEPARIN SODIUM 14 UNIT(S)/HR: 5000 INJECTION INTRAVENOUS; SUBCUTANEOUS at 08:00

## 2021-10-19 RX ADMIN — POLYETHYLENE GLYCOL 3350 17 GRAM(S): 17 POWDER, FOR SOLUTION ORAL at 18:43

## 2021-10-19 RX ADMIN — OXYCODONE HYDROCHLORIDE 5 MILLIGRAM(S): 5 TABLET ORAL at 08:30

## 2021-10-19 RX ADMIN — PIPERACILLIN AND TAZOBACTAM 200 GRAM(S): 4; .5 INJECTION, POWDER, LYOPHILIZED, FOR SOLUTION INTRAVENOUS at 18:43

## 2021-10-19 NOTE — H&P ADULT - ASSESSMENT
81yoM poor historian with PMHx gout, depression, MVR (1994, on warfarin), L nephrectomy 2/2 kidney CA (unclear type), substance abuse, ?HTN, ?Afib, ?HLD, ?BPH presents with R knee pain suspected 2/2 soft tissue injury vs. gout flare.

## 2021-10-19 NOTE — PROGRESS NOTE ADULT - SUBJECTIVE AND OBJECTIVE BOX
CC: Patient is a 81y old  Male who presents with a chief complaint of knee pain      INTERVAL EVENTS: JUDI    SUBJECTIVE / INTERVAL HPI: Patient seen and examined at bedside.     ROS: negative unless otherwise stated above.    VITAL SIGNS:  Vital Signs Last 24 Hrs  T(C): 38.3 (19 Oct 2021 06:14), Max: 38.8 (18 Oct 2021 15:43)  T(F): 100.9 (19 Oct 2021 06:14), Max: 101.8 (18 Oct 2021 15:43)  HR: 94 (19 Oct 2021 06:14) (74 - 95)  BP: 162/80 (19 Oct 2021 06:14) (127/79 - 162/80)  BP(mean): --  RR: 17 (19 Oct 2021 06:14) (16 - 18)  SpO2: 95% (19 Oct 2021 06:14) (94% - 98%)        PHYSICAL EXAM:    General: NAD  HEENT: MMM  Neck: supple  Cardiovascular: +S1/S2; RRR  Respiratory: CTA B/L; no W/R/R  Gastrointestinal: soft, NT/ND  Extremities: WWP; no edema, clubbing or cyanosis  Vascular: 2+ radial, DP/PT pulses B/L  Neurological: AAOx3; no focal deficits    MEDICATIONS:  MEDICATIONS  (STANDING):  allopurinol 50 milliGRAM(s) Oral daily  buPROPion XL (24-Hour) . 300 milliGRAM(s) Oral daily  digoxin     Tablet 125 MICROGram(s) Oral daily  fenofibrate Tablet 145 milliGRAM(s) Oral daily  furosemide   Injectable 40 milliGRAM(s) IV Push once  heparin   Injectable 6500 Unit(s) IV Push once  heparin  Infusion 1400 Unit(s)/Hr (14 mL/Hr) IV Continuous <Continuous>  influenza  Vaccine (HIGH DOSE) 0.7 milliLiter(s) IntraMuscular once  piperacillin/tazobactam IVPB.. 3.375 Gram(s) IV Intermittent every 6 hours  polyethylene glycol 3350 17 Gram(s) Oral two times a day  senna 2 Tablet(s) Oral at bedtime  simvastatin 10 milliGRAM(s) Oral at bedtime  tamsulosin 0.4 milliGRAM(s) Oral at bedtime  vancomycin  IVPB 1000 milliGRAM(s) IV Intermittent every 24 hours    MEDICATIONS  (PRN):  acetaminophen   Tablet .. 650 milliGRAM(s) Oral every 6 hours PRN Temp greater or equal to 38C (100.4F), Mild Pain (1 - 3)  LORazepam   Injectable 1 milliGRAM(s) IV Push every 2 hours PRN CIWA-Ar score increase by 2 points and a total score of 7 or less  oxyCODONE    IR 5 milliGRAM(s) Oral every 4 hours PRN Severe Pain (7 - 10)  traMADol 25 milliGRAM(s) Oral every 4 hours PRN Moderate Pain (4 - 6)      ALLERGIES:  Allergies    No Known Allergies    Intolerances        LABS:                        10.0   10.92 )-----------( 147      ( 18 Oct 2021 15:38 )             30.3     10-18    138  |  104  |  25<H>  ----------------------------<  113<H>  3.8   |  27  |  1.50<H>    Ca    9.4      18 Oct 2021 15:38  Mg     2.0     10-18    TPro  7.7  /  Alb  3.5  /  TBili  1.5<H>  /  DBili  x   /  AST  19  /  ALT  23  /  AlkPhos  65  10-18    PT/INR - ( 18 Oct 2021 15:38 )   PT: 21.0 sec;   INR: 1.80          PTT - ( 18 Oct 2021 15:38 )  PTT:37.4 sec  Urinalysis Basic - ( 18 Oct 2021 15:44 )    Color: Yellow / Appearance: Clear / S.015 / pH: x  Gluc: x / Ketone: NEGATIVE  / Bili: NEGATIVE / Urobili: 0.2 E.U./dL   Blood: x / Protein: Trace mg/dL / Nitrite: NEGATIVE   Leuk Esterase: NEGATIVE / RBC: < 5 /HPF / WBC < 5 /HPF   Sq Epi: x / Non Sq Epi: 0-5 /HPF / Bacteria: Present /HPF      CAPILLARY BLOOD GLUCOSE          RADIOLOGY & ADDITIONAL TESTS: Reviewed. CC: Patient is a 81y old  Male who presents with a chief complaint of knee pain      INTERVAL EVENTS: JUDI      SUBJECTIVE / INTERVAL HPI: Patient seen and examined at bedside.     ROS: negative unless otherwise stated above.    VITAL SIGNS:  Vital Signs Last 24 Hrs  T(C): 38.3 (19 Oct 2021 06:14), Max: 38.8 (18 Oct 2021 15:43)  T(F): 100.9 (19 Oct 2021 06:14), Max: 101.8 (18 Oct 2021 15:43)  HR: 94 (19 Oct 2021 06:14) (74 - 95)  BP: 162/80 (19 Oct 2021 06:14) (127/79 - 162/80)  BP(mean): --  RR: 17 (19 Oct 2021 06:14) (16 - 18)  SpO2: 95% (19 Oct 2021 06:14) (94% - 98%)        PHYSICAL EXAM:    General: NAD  HEENT: MMM  Neck: supple  Cardiovascular: +S1/S2; RRR  Respiratory: CTA B/L; no W/R/R  Gastrointestinal: soft, NT/ND  Extremities: WWP; no edema, clubbing or cyanosis  Vascular: 2+ radial, DP/PT pulses B/L  Neurological: AAOx3; no focal deficits    MEDICATIONS:  MEDICATIONS  (STANDING):  allopurinol 50 milliGRAM(s) Oral daily  buPROPion XL (24-Hour) . 300 milliGRAM(s) Oral daily  digoxin     Tablet 125 MICROGram(s) Oral daily  fenofibrate Tablet 145 milliGRAM(s) Oral daily  furosemide   Injectable 40 milliGRAM(s) IV Push once  heparin   Injectable 6500 Unit(s) IV Push once  heparin  Infusion 1400 Unit(s)/Hr (14 mL/Hr) IV Continuous <Continuous>  influenza  Vaccine (HIGH DOSE) 0.7 milliLiter(s) IntraMuscular once  piperacillin/tazobactam IVPB.. 3.375 Gram(s) IV Intermittent every 6 hours  polyethylene glycol 3350 17 Gram(s) Oral two times a day  senna 2 Tablet(s) Oral at bedtime  simvastatin 10 milliGRAM(s) Oral at bedtime  tamsulosin 0.4 milliGRAM(s) Oral at bedtime  vancomycin  IVPB 1000 milliGRAM(s) IV Intermittent every 24 hours    MEDICATIONS  (PRN):  acetaminophen   Tablet .. 650 milliGRAM(s) Oral every 6 hours PRN Temp greater or equal to 38C (100.4F), Mild Pain (1 - 3)  LORazepam   Injectable 1 milliGRAM(s) IV Push every 2 hours PRN CIWA-Ar score increase by 2 points and a total score of 7 or less  oxyCODONE    IR 5 milliGRAM(s) Oral every 4 hours PRN Severe Pain (7 - 10)  traMADol 25 milliGRAM(s) Oral every 4 hours PRN Moderate Pain (4 - 6)      ALLERGIES:  Allergies    No Known Allergies    Intolerances        LABS:                        10.0   10.92 )-----------( 147      ( 18 Oct 2021 15:38 )             30.3     10-18    138  |  104  |  25<H>  ----------------------------<  113<H>  3.8   |  27  |  1.50<H>    Ca    9.4      18 Oct 2021 15:38  Mg     2.0     10-18    TPro  7.7  /  Alb  3.5  /  TBili  1.5<H>  /  DBili  x   /  AST  19  /  ALT  23  /  AlkPhos  65  10-18    PT/INR - ( 18 Oct 2021 15:38 )   PT: 21.0 sec;   INR: 1.80          PTT - ( 18 Oct 2021 15:38 )  PTT:37.4 sec  Urinalysis Basic - ( 18 Oct 2021 15:44 )    Color: Yellow / Appearance: Clear / S.015 / pH: x  Gluc: x / Ketone: NEGATIVE  / Bili: NEGATIVE / Urobili: 0.2 E.U./dL   Blood: x / Protein: Trace mg/dL / Nitrite: NEGATIVE   Leuk Esterase: NEGATIVE / RBC: < 5 /HPF / WBC < 5 /HPF   Sq Epi: x / Non Sq Epi: 0-5 /HPF / Bacteria: Present /HPF      CAPILLARY BLOOD GLUCOSE          RADIOLOGY & ADDITIONAL TESTS: Reviewed. CC: Patient is a 81y old  Male who presents with a chief complaint of knee pain      INTERVAL EVENTS: JUDI      SUBJECTIVE / INTERVAL HPI: Patient seen and examined at bedside. Patient denies SOB but endorses some left sided chest pain that is pleuritic in nature.     ROS: negative unless otherwise stated above.    VITAL SIGNS:  Vital Signs Last 24 Hrs  T(C): 38.3 (19 Oct 2021 06:14), Max: 38.8 (18 Oct 2021 15:43)  T(F): 100.9 (19 Oct 2021 06:14), Max: 101.8 (18 Oct 2021 15:43)  HR: 94 (19 Oct 2021 06:14) (74 - 95)  BP: 162/80 (19 Oct 2021 06:14) (127/79 - 162/80)  BP(mean): --  RR: 17 (19 Oct 2021 06:14) (16 - 18)  SpO2: 95% (19 Oct 2021 06:14) (94% - 98%)        PHYSICAL EXAM:    General: NAD  HEENT: MMM, Right eye deviated to the right. EOM+ in the left eye.   Cardiovascular: +S1/S2; regular rate with irregular Rhythm with a 2/4 systolic murmur     Respiratory: Expiratory wheezing heard most prominently in the RLL. Audible wheezing on naked ear exam suggestive of upper airway wheezing.   Gastrointestinal: hard and distended but not tender to palpation   Extremities: WWP; no clubbing or cyanosis. 1+ pitting edema to the shins bilaterally. R knee edema with slight warmth compared to the Left side. Discolorations of the bilateral shins.   Vascular: 2+ radial, DP/PT pulses B/L  Neurological: AAOx3; moves all extremities on command     MEDICATIONS:  MEDICATIONS  (STANDING):  allopurinol 50 milliGRAM(s) Oral daily  buPROPion XL (24-Hour) . 300 milliGRAM(s) Oral daily  digoxin     Tablet 125 MICROGram(s) Oral daily  fenofibrate Tablet 145 milliGRAM(s) Oral daily  furosemide   Injectable 40 milliGRAM(s) IV Push once  heparin   Injectable 6500 Unit(s) IV Push once  heparin  Infusion 1400 Unit(s)/Hr (14 mL/Hr) IV Continuous <Continuous>  influenza  Vaccine (HIGH DOSE) 0.7 milliLiter(s) IntraMuscular once  piperacillin/tazobactam IVPB.. 3.375 Gram(s) IV Intermittent every 6 hours  polyethylene glycol 3350 17 Gram(s) Oral two times a day  senna 2 Tablet(s) Oral at bedtime  simvastatin 10 milliGRAM(s) Oral at bedtime  tamsulosin 0.4 milliGRAM(s) Oral at bedtime  vancomycin  IVPB 1000 milliGRAM(s) IV Intermittent every 24 hours    MEDICATIONS  (PRN):  acetaminophen   Tablet .. 650 milliGRAM(s) Oral every 6 hours PRN Temp greater or equal to 38C (100.4F), Mild Pain (1 - 3)  LORazepam   Injectable 1 milliGRAM(s) IV Push every 2 hours PRN CIWA-Ar score increase by 2 points and a total score of 7 or less  oxyCODONE    IR 5 milliGRAM(s) Oral every 4 hours PRN Severe Pain (7 - 10)  traMADol 25 milliGRAM(s) Oral every 4 hours PRN Moderate Pain (4 - 6)      ALLERGIES:  Allergies    No Known Allergies    Intolerances        LABS:                        10.0   10.92 )-----------( 147      ( 18 Oct 2021 15:38 )             30.3     10-18    138  |  104  |  25<H>  ----------------------------<  113<H>  3.8   |  27  |  1.50<H>    Ca    9.4      18 Oct 2021 15:38  Mg     2.0     10    TPro  7.7  /  Alb  3.5  /  TBili  1.5<H>  /  DBili  x   /  AST  19  /  ALT  23  /  AlkPhos  65  10-18    PT/INR - ( 18 Oct 2021 15:38 )   PT: 21.0 sec;   INR: 1.80          PTT - ( 18 Oct 2021 15:38 )  PTT:37.4 sec  Urinalysis Basic - ( 18 Oct 2021 15:44 )    Color: Yellow / Appearance: Clear / S.015 / pH: x  Gluc: x / Ketone: NEGATIVE  / Bili: NEGATIVE / Urobili: 0.2 E.U./dL   Blood: x / Protein: Trace mg/dL / Nitrite: NEGATIVE   Leuk Esterase: NEGATIVE / RBC: < 5 /HPF / WBC < 5 /HPF   Sq Epi: x / Non Sq Epi: 0-5 /HPF / Bacteria: Present /HPF      CAPILLARY BLOOD GLUCOSE          RADIOLOGY & ADDITIONAL TESTS: Reviewed.

## 2021-10-19 NOTE — H&P ADULT - PROBLEM SELECTOR PLAN 7
Hx of renal cancer (unclear type) with L nephrectomy.  Baseline Cr unclear  Cr upon admission 1.50.  UA negative.  - avoid nephrotoxic meds  - renally adjust meds  - no NSAIDS  - urine lytes Patient reports multiple falls in past, lives alone, does not know all of his medications and requires help with his medications.  Also states he's been feeling depressed.  - PT ricardo  - SW consult Patient reports multiple falls in past, lives alone, does not know all of his medications and requires help with his medications.  Also states he's been feeling depressed.  - PT ricardo  - [facility] consult  - consider psych consult

## 2021-10-19 NOTE — H&P ADULT - HISTORY OF PRESENT ILLNESS
**incomplete note**    81yoM     In the ED:  Initial vital signs: Tmax: 101.8 F, HR: 94, BP: 127/79, R: 18, SpO2: 98% on RA  ED course:   Labs: significant for WBC 10.92, Hgb 10.0, Plt 147, PT/INR 21/1.80, PTT 37.4, BUN/Cr 24/1.50, , arthrocentesis with 100% granulocytes  Imaging:  CXR:   EKG:   Medications:   Consults: none  **incomplete note**    81yoM     In the ED:  Initial vital signs: Tmax: 101.8 F, HR: 94, BP: 127/79, R: 18, SpO2: 98% on RA  ED course:   Labs: significant for WBC 10.92, Hgb 10.0, Plt 147, PT/INR 21/1.80, PTT 37.4, BUN/Cr 24/1.50, , arthrocentesis with 100% granulocytes  Imaging:  CXR: No acute cardiopulmonary disease process. Cardiomegaly.  RLE U/S: Negative for DVT  XR R knee:  Frontal and lateral views of the right knee demonstrate severe medial compartment narrowing. Large joint effusion. There is small vessel calcification. Appropriate osseous mineralization. No fracture. No dislocation. Limited secondary to positioning.  EKG: afib with LBBB  Medications: 975mg tylenol, 2mg morphine X2, 3.375mg zosyn, 2.3L NS  Consults: none  **incomplete note**    81yoM poor historian with PMHx gout, depression, MVR (1994, on warfarin), L nephrectomy 2/2 kidney CA (unclear type), substance abuse, ?HTN, ?Afib, ?HLD, ?BPH presents with R knee pain that began 4 days ago.  He was "exercising" while at [employer], standing up and down on his toes.  He denies any trauma to his knee or recent falls but has fallen 3X in the past month.  He has not been able to walk for 2 days bc of the pain in his knee.  He has not been taking his allopurinol for the past month because he didn't realize until recently that he hadn't been taking it.  He also doesn't know all of his medications and states that someone from his apartment complex helps him.  He believes that he "hasn't been able to function (on his own) for a while now".  He has not taken any pain medications since his current injury began.  He does endorse previous gout flares but unable to recall when and which areas of his body were affected.  He states he's been feeling depressed lately and more confused.  He has "fits of anger", is paranoid, and talks to himself a lot.  He denies SI/HI.  His depression worsened lately because his relationship with his girlfriend (40 years younger) is strained.  He denies any recent sexual activity.  He is a former alcoholic but drank ~2 bottles wine for 3 days ~7-8 days ago.  That was the last time he had alcohol.  He denies any smoking and has a remote hx of drug use (marijuana and cocaine).  He denies any fevers, cough, chest pain, urinary symptoms, diarrhea.  He has felt constipated and his last BM was 2 days ago.      In the ED:  Initial vital signs: Tmax: 101.8 F, HR: 94, BP: 127/79, R: 18, SpO2: 98% on RA  ED course:   Labs: significant for WBC 10.92, Hgb 10.0, Plt 147, PT/INR 21/1.80, PTT 37.4, BUN/Cr 24/1.50, , arthrocentesis with 100% granulocytes  Imaging:  CXR: No acute cardiopulmonary disease process. Cardiomegaly.  RLE U/S: Negative for DVT  XR R knee:  Frontal and lateral views of the right knee demonstrate severe medial compartment narrowing. Large joint effusion. There is small vessel calcification. Appropriate osseous mineralization. No fracture. No dislocation. Limited secondary to positioning.  EKG: afib with LBBB  Medications: 975mg tylenol, 2mg morphine X2, 3.375mg zosyn, 2.3L NS  Consults: none  81yoM poor historian with PMHx gout, depression, MVR (1994, on warfarin), L nephrectomy 2/2 kidney CA (unclear type), substance abuse, ?HTN, ?Afib, ?HLD, ?BPH presents with R knee pain that began 4 days ago.  He was "exercising" while at [employer], standing up and down on his toes.  He denies any trauma to his knee or recent falls but has fallen 3X in the past month.  He has not been able to walk for 2 days bc of the pain in his knee.  Hehas not been taking his allopurinol for the past month because he didn't realize until recently that he hadn't been taking it.  He also doesn't know all of his medications and states that someone from his apartment complex helps him.  He believes that he "hasn't been able to function (on his own) for a while now".  He has not taken any pain medications since his current injury began.  He does endorse previous gout flares but unable to recall when and which areas of his body were affected.  He states he's been feeling depressed lately and more confused.  He has "fits of anger", is paranoid, and talks to himself a lot.  He denies SI/HI.  His depression worsened lately because his relationship with his girlfriend (40 years younger) is strained.  He denies any recent sexual activity.  He is a former alcoholic but drank ~2 bottles wine for 3 days ~7-8 days ago.  That was the last time he had alcohol.  He denies any smoking and has a remote hx of drug use (marijuana and cocaine).  He denies any fevers, cough, chest pain, urinary symptoms, diarrhea.  He has felt constipated and his last BM was 2 days ago.      In the ED:  Initial vital signs: Tmax: 101.8 F, HR: 94, BP: 127/79, R: 18, SpO2: 98% on RA  ED course:   Labs: significant for WBC 10.92, Hgb 10.0, Plt 147, Bili 1.5, PT/INR 21/1.80, PTT 37.4, BUN/Cr 24/1.50, , arthrocentesis with 100% granulocytes and cloudy fluid  Imaging:  CXR: No acute cardiopulmonary disease process. Cardiomegaly.  RLE U/S: Negative for DVT  XR R knee:  Frontal and lateral views of the right knee demonstrate severe medial compartment narrowing. Large joint effusion. There is small vessel calcification. Appropriate osseous mineralization. No fracture. No dislocation. Limited secondary to positioning.  EKG: afib with LBBB  Medications: 975mg tylenol, 2mg morphine X2, 3.375mg zosyn, 2.3L NS  Consults: none

## 2021-10-19 NOTE — PROGRESS NOTE ADULT - PROBLEM SELECTOR PLAN 10
Denies hx of HTN although per chart review on carvedilol 6.25mg and lasix 20mg qd.  - hold i/s/o renal insufficiency and sepsis  - monitor BP  - official med rec    #HLD  Denies hx of HLD although per chart review on fenofibrate 145mg qd and simvastatin 10mg qhs.  - c/w home meds  - AM lipid panel  - official med rec

## 2021-10-19 NOTE — PROGRESS NOTE ADULT - PROBLEM SELECTOR PLAN 6
Hx of depression and currently reporting feeling depressed and more confused lately.  Denies any SI/HI.  On buproprion 300mg qd per chart review.  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  Has not had a drink in 7-8 days but did drink ~2 bottles of wine at that time.  Is having relationship difficulties with his girlfriend.  Also reports remote hx of marijuana and cocaine use.  Denies sexual activity at this time.    - low risk CIWA  - HIV screen, hep panel  - Ordered abdominal US 2/2 abdominal distension - f/u

## 2021-10-19 NOTE — H&P ADULT - PROBLEM SELECTOR PLAN 9
Hgb 10.0 upon admission.  Unclear baseline.  No active signs of bleeding on exam.  - f/u AM iron studies, folate, B12  - daily CBC

## 2021-10-19 NOTE — PROGRESS NOTE ADULT - PROBLEM SELECTOR PLAN 5
Unclear hx of afib, although reports he's on coumadin and digoxin (chart review 125mcg qd).  Heart irregular upon auscultation.  EKG with afib.  - AM dig level  - c/w home dose digoxin  - start hep gtt for full AC  - official med rec pending

## 2021-10-19 NOTE — DISCHARGE NOTE PROVIDER - HOSPITAL COURSE
#Discharge: do not delete    81yoM poor historian with PMHx gout, depression, MVR (1994, on warfarin), L nephrectomy 2/2 kidney CA (unclear type), substance abuse, ?HTN, ?Afib, ?HLD, ?BPH presents with R knee pain x4 days s/p arthrocentesis showing increased nucleated cell count found to be septic with unknown source now on Vanc and Zosyn.     #Right knee pain.   - XRay knee showed joint effusion   - Arthrocentesis with many WBC, f/u gram stain and cx  - ortho does not recommend any intervention.    #Systemic inflammatory response syndrome (SIRS).   - Meets 3/4 SIRS with elevated WBC, fever, tachycardia.  Unclear signs of infection as UA and CXR clear.  Denies any diarrhea.  No signs of skin infection on exam.  - f/u BCx  - f/u UCx.    #Gout.   - Hx of gout on allopurinol 300mg qd.  Patient states he realized he has not been taking the medication for a month.  - restart allopurinol at 50mg qd i/s/o renal impairment  - f/u uric acid  - colchicine if in flare, as pt with reduced kidney function.    #/P MVR (mitral valve replacement).   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 INR upon admission 1.8.  Unclear type of valve and duration of therapy, thus unable to determine appropriate goal (2-3 vs. 2.5-3.5).  - restarted home coumadin 5mg nightly   - Ordered TTE that showed ####3    #Afib.   ·  Plan: Unclear hx of afib, although reports he's on coumadin and digoxin (chart review 125mcg qd).  Heart irregular upon auscultation.  EKG with afib.  - AM dig level  - c/w home dose digoxin     Problem/Plan - 6:  ·  Problem: Major depression.   ·  Plan: Hx of depression and currently reporting feeling depressed and more confused lately.  Denies any SI/HI.  On buproprion 300mg qd per chart review.  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  Has not had a drink in 7-8 days but did drink ~2 bottles of wine at that time.  Is having relationship difficulties with his girlfriend.  Also reports remote hx of marijuana and cocaine use.  Denies sexual activity at this time.    - low risk CIWA  - HIV screen, hep panel  - Ordered abdominal US 2/2 abdominal distension - f/u.     Problem/Plan - 7:  ·  Problem: Encounter for social work intervention.   ·  Plan: Patient reports multiple falls in past, lives alone, does not know all of his medications and requires help with his medications.  Also states he's been feeling depressed.  - PT eval  - SW consult.    #Elevated serum creatinine.   - Hx of renal cancer (unclear type) with L nephrectomy.  Baseline Cr unclear  Cr upon admission 1.50.  UA negative.  - s/p 2.3L NS in ED  - f/u urine lytes ######      New medications:   Labs to be followed outpatient:   Exam to be followed outpatient:    #Discharge: do not delete    81yoM poor historian with PMHx gout, depression, MVR (1994, on warfarin), L nephrectomy 2/2 kidney CA (unclear type), substance abuse, HTN, Afib, HLD, BPH presents with R knee pain x4 days s/p arthrocentesis showing increased nucleated cell count found to be septic with unknown source     #Right knee pain.   - XRay knee showed joint effusion   - Arthrocentesis with 15076 nucleated cells, gram stain and cx are negative, no crystals seen  - uric acid level is wnl  -differential is gout flare vs. septic arthritis     #Systemic inflammatory response syndrome (SIRS).   - Met 3/4 SIRS on admission with elevated WBC 10.92, fever 101.8, tachycardia 94.  Unclear signs of infection as UA and CXR clear.  Denies any diarrhea.  No signs of skin infection on exam.  - BCx negative  - UCx showed normal urogenital hilda     #Gout.   - Hx of gout on allopurinol 300mg qd.  Patient states he realized he has not been taking the medication for a month.  - uric acid wnl and no crystals seen in arthrocentesis   - restart allopurinol at 50mg qd i/s/o renal impairment  - avoid nsaids and colchicine in setting of renal impairment   - can consider using steroids     #MVR (mitral valve replacement)?   Patient and PCP Dr. Alonso's medical office report hx of MVR on coumadin 5mg qd. However, TTE shows valve is repair and annuloplasty. Per discussion with patient's cardiologist Dr. Bennett, there was no need to treat with coumadin.   - initially restarted home coumadin 5mg nightly   - TTE showed valve is repair and annuloplasty and warfarin was not needed   - transitioned to eliquis to treat afib     #Afib.   Dr. Alonso's medical office confirms hx of afib and patient reports he's on coumadin and digoxin (chart review 125mcg qd).  Heart irregular upon auscultation.  EKG with afib.  - c/w home dose digoxin  - transitioned to eliquis upon finding out that warfarin was no longer needed (see MVR section above)    #Major depression.   Hx of depression and currently reporting feeling depressed and more confused lately.  Denies any SI/HI.  On buproprion 300mg qd per chart review and confirmed with Dr. Alonso's clinic.   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  Has not had a drink in 7-8 days but did drink ~2 bottles of wine at that time.  Is having relationship difficulties with his girlfriend.  Also reports remote hx of marijuana and cocaine use.  Denies sexual activity at this time.    - low risk CIWA  - Ordered abdominal US 2/2 abdominal distension - WNL    #Anemia.   Hgb 10.0 upon admission.  Unclear baseline.  No active signs of bleeding on exam. Iron studies show anemia of chronic disease, folate and B12 are wnl   - follow up outpatient    #HTN  Med rec confirmed patient is on carvedilol 6.25mg and lasix 20mg qd. Held i/s/o renal insufficiency and sepsis  - restart home meds and follow up outpatient     #HLD  Chart review and med rec with clinic confirm fenofibrate 145mg qd and simvastatin 10mg qhs. Lipid panel wnl.   - c/w home meds fenofibrate 145mg qd and simvastatin 10mg qhs    #BPH (benign prostatic hyperplasia).   Chart review and office med rec confirm patient is on tamsulosin 0.4mg.  - c/w home med tamsulosin 0.4mg          New medications: eliquis   Labs to be followed outpatient: none  Exam to be followed outpatient: none   #Discharge: do not delete    81yoM poor historian with PMHx gout, depression, MVR (1994, on warfarin), L nephrectomy 2/2 kidney CA (unclear type), substance abuse, HTN, Afib, HLD, BPH presents with R knee pain x4 days s/p arthrocentesis showing increased nucleated cell count found to be septic with unknown source     #Right knee pain.   - XRay knee significant for severe medial compartment narrowing and joint effusion   - Arthrocentesis with 49342 nucleated cells, gram stain and joint cx are negative, no crystals seen  - uric acid level is wnl  -differential is gout flare vs. septic arthritis     #Systemic inflammatory response syndrome (SIRS).   - Met 3/4 SIRS on admission with elevated WBC 10.92, fever 101.8, tachycardia 94.  Unclear signs of infection as UA and CXR clear.  Denies any diarrhea.  No signs of skin infection on exam.  - BCx negative  - Joint cx negative   - UCx showed normal urogenital hilda     #Gout.   - Hx of gout on allopurinol 300mg qd.  Patient states he realized he has not been taking the medication for a month.  - uric acid wnl and no crystals seen in arthrocentesis   - restart allopurinol at 50mg qd i/s/o renal impairment  - avoid nsaids and colchicine in setting of renal impairment   - can consider using steroids     #?MVR (mitral valve replacement)?   Patient and PCP Dr. Alonso's medical office report hx of MVR on coumadin 5mg qd. However, TTE shows mitral valve is repair and annuloplasty, not replacement. Per discussion with patient's cardiologist Dr. Bennett, there is no need to treat with coumadin as the valve is repair and annuloplasty.   - pt initally on home coumadin 5mg nightly until TTE and discussion with Dr. Bennett   - TTE showed valve is repair and annuloplasty and warfarin was not needed   - transitioned to eliquis (to treat afib)    #Afib.   Dr. Alonso's medical office confirms hx of afib with coumadin and digoxin 125mcg qd.  Heart irregular upon auscultation.  EKG with afib.  - c/w home dose digoxin  - transitioned to eliquis upon finding out that warfarin was no longer needed (see MVR? section above)    #Major depression.   Hx of depression and currently reporting feeling depressed and more confused lately.  Denies any SI/HI.  On buproprion 300mg qd per chart review and confirmed with Dr. Alonso's clinic.   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  Has not had a drink in 7-8 days but did drink ~2 bottles of wine at that time.  Is having relationship difficulties with his girlfriend.  Also reports remote hx of marijuana and cocaine use.  Denies sexual activity at this time.    - was on CIWA protocol this admission  - Ordered abdominal US 2/2 abdominal distension - WNL    #Anemia.   Hgb 10.0 upon admission.  Unclear baseline.  No active signs of bleeding on exam. Iron studies show anemia of chronic disease, folate and B12 are wnl   - follow up outpatient    #HTN  Med rec confirmed patient is on carvedilol 6.25mg and lasix 20mg qd. Held i/s/o renal insufficiency and sepsis  - restart home meds and follow up outpatient     #HLD  Chart review and med rec with clinic confirm fenofibrate 145mg qd and simvastatin 10mg qhs. Lipid panel wnl this admission.   - c/w home meds fenofibrate 145mg qd and simvastatin 10mg qhs    #BPH (benign prostatic hyperplasia).   Chart review and office med rec confirm patient is on tamsulosin 0.4mg.  - c/w home med tamsulosin 0.4mg          New medications: eliquis   Labs to be followed outpatient: none  Exam to be followed outpatient: none   #Discharge: do not delete    81yoM poor historian with PMHx gout, depression, MVR (1994, on warfarin), L nephrectomy 2/2 kidney CA (unclear type), substance abuse, HTN, Afib, HLD, BPH presents with R knee pain x4 days s/p arthrocentesis showing increased nucleated cell count found to be septic with unknown source     #Right knee pain.   - XRay knee significant for severe medial compartment narrowing and joint effusion   - On exam, the right knee is more swollen compared to the left and is warm to touch; however, there is no redness; stiffness was present on admission but reduced over time   - Arthrocentesis with 33424 nucleated cells, gram stain and joint cx are negative, no crystals seen  - uric acid level is wnl  -differential is gout flare vs. septic arthritis     #Systemic inflammatory response syndrome (SIRS).   - Met 3/4 SIRS on admission with elevated WBC 10.92, fever 101.8, tachycardia 94.  Unclear signs of infection as UA and CXR clear.  Denies any diarrhea.  No signs of skin infection on exam.  - BCx negative  - Joint cx negative   - UCx showed normal urogenital hilda     #Gout.   - Hx of gout on allopurinol 300mg qd.  Patient states he realized he has not been taking the medication for a month.  - uric acid wnl and no crystals seen in arthrocentesis   - restart allopurinol at 50mg qd i/s/o renal impairment  - avoid nsaids and colchicine in setting of renal impairment   - can consider using steroids     #?MVR (mitral valve replacement)?   Patient and PCP Dr. Alonso's medical office report hx of MVR on coumadin 5mg qd. However, TTE shows mitral valve is repair and annuloplasty, not replacement. Per discussion with patient's cardiologist Dr. Bennett, there is no need to treat with coumadin as the valve is repair and annuloplasty.   - pt initally on home coumadin 5mg nightly until TTE and discussion with Dr. Bennett   - TTE showed valve is repair and annuloplasty and warfarin was not needed   - transitioned to eliquis (to treat afib)    #Afib.   Dr. Alonso's medical office confirms hx of afib with coumadin and digoxin 125mcg qd.  Heart irregular upon auscultation.  EKG with afib.  - c/w home dose digoxin  - transitioned to eliquis upon finding out that warfarin was no longer needed (see MVR? section above)    #Major depression.   Hx of depression and currently reporting feeling depressed and more confused lately.  Denies any SI/HI.  On buproprion 300mg qd per chart review and confirmed with Dr. Alonso's clinic.   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  Has not had a drink in 7-8 days but did drink ~2 bottles of wine at that time.  Is having relationship difficulties with his girlfriend.  Also reports remote hx of marijuana and cocaine use.  Denies sexual activity at this time.    - was on CIWA protocol this admission  - Ordered abdominal US 2/2 abdominal distension - WNL    #Anemia.   Hgb 10.0 upon admission.  Unclear baseline.  No active signs of bleeding on exam. Iron studies show anemia of chronic disease, folate and B12 are wnl   - follow up outpatient    #HTN  Med rec confirmed patient is on carvedilol 6.25mg and lasix 20mg qd. Held i/s/o renal insufficiency and sepsis  - restart home meds and follow up outpatient     #HLD  Chart review and med rec with clinic confirm fenofibrate 145mg qd and simvastatin 10mg qhs. Lipid panel wnl this admission.   - c/w home meds fenofibrate 145mg qd and simvastatin 10mg qhs    #BPH (benign prostatic hyperplasia).   Chart review and office med rec confirm patient is on tamsulosin 0.4mg.  - c/w home med tamsulosin 0.4mg          New medications: eliquis   Labs to be followed outpatient: none  Exam to be followed outpatient: none   #Discharge: do not delete    81yoM poor historian with PMHx gout, depression, MVR (1994, on warfarin), L nephrectomy 2/2 kidney CA (unclear type), substance abuse, HTN, Afib, HLD, BPH presents with R knee pain x4 days s/p arthrocentesis showing increased nucleated cell count found to be septic with unknown source     #Right knee pain.   XRay knee significant for severe medial compartment narrowing and joint effusion. Arthrocentesis with 65443 nucleated cells, gram stain and joint cx are negative, no crystals seen. On exam, the right knee is more swollen compared to the left and is warm to touch; however, there is no redness; stiffness was present on admission but reduced over time. Uric acid level is wnl. Differential is gout flare vs. septic arthritis  -no antibiotics on discharge   -follow up with PCP and ortho outpatient      #Systemic inflammatory response syndrome (SIRS).   RESOLVED  Met 3/4 SIRS on admission with elevated WBC 10.92, fever 101.8, tachycardia 94.  Unclear signs of infection as UA and CXR clear.  Denies any diarrhea.  No signs of skin infection on exam. BCx negative. Joint cx negative. UCx showed normal urogenital hilda   -follow up with PCP outpatient     #Gout.   Hx of gout on allopurinol 300mg qd.  Patient states he realized he has not been taking the medication for a month. Uric acid wnl and no crystals seen in arthrocentesis   - restart allopurinol at 50mg qd i/s/o renal impairment  - avoid nsaids and colchicine in setting of renal impairment   - can consider using steroids   -follow up with PCP outpatient     #?MVR (mitral valve replacement)?   Patient and PCP Dr. Alonso's medical office report hx of MVR on coumadin 5mg qd. However, TTE shows mitral valve is repair and annuloplasty, not replacement. Per discussion with patient's cardiologist Dr. Bennett, there is no need to treat with coumadin as the valve is repair and annuloplasty.   - pt initally on home coumadin 5mg nightly until TTE and discussion with Dr. Bennett   - TTE showed valve is repair and annuloplasty and warfarin was not needed   - transitioned to eliquis (to treat afib)    #Afib.   Dr. Alonso's medical office confirms hx of afib with coumadin and digoxin 125mcg qd.  Heart irregular upon auscultation.  EKG with afib.  - c/w home dose digoxin  - transitioned to eliquis upon finding out that warfarin was no longer needed (see MVR? section above)    #Major depression.   Hx of depression and currently reporting feeling depressed and more confused lately.  Denies any SI/HI.  On buproprion 300mg qd per chart review and confirmed with Dr. Alonso's clinic.   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  Has not had a drink in 7-8 days but did drink ~2 bottles of wine at that time.  Is having relationship difficulties with his girlfriend.  Also reports remote hx of marijuana and cocaine use.  Denies sexual activity at this time.  Was on CIWA protocol this admission. Ordered abdominal US 2/2 abdominal distension - WNL  -follow up outpatient    #Anemia.   Hgb 10.0 upon admission.  Unclear baseline.  No active signs of bleeding on exam. Iron studies show anemia of chronic disease, folate and B12 are wnl   - follow up outpatient    #HTN  Med rec confirmed patient is on carvedilol 6.25mg and lasix 20mg qd. Held i/s/o renal insufficiency and sepsis  - restart carvedilol 6.25mg and lasix 20mg qd and follow up outpatient     #HLD  Chart review and med rec with clinic confirm fenofibrate 145mg qd and simvastatin 10mg qhs. Lipid panel wnl this admission.   - c/w home meds fenofibrate 145mg qd and simvastatin 10mg qhs    #BPH (benign prostatic hyperplasia).   Chart review and office med rec confirm patient is on tamsulosin 0.4mg.  - c/w home med tamsulosin 0.4mg          New medications: eliquis   Labs to be followed outpatient: none  Exam to be followed outpatient: none   #Discharge: do not delete    81yoM poor historian with PMHx gout, depression, MVR (1994, on warfarin), L nephrectomy 2/2 kidney CA (unclear type), substance abuse, HTN, Afib, HLD, BPH presents with R knee pain x4 days s/p arthrocentesis showing increased nucleated cell count found to be septic with unknown source     #Right knee pain.   XRay knee significant for severe medial compartment narrowing and joint effusion. Arthrocentesis with 27,120 nucleated cells, gram stain and joint cx are negative, no crystals seen. On exam, the right knee is more swollen compared to the left and is warm to touch; however, there is no redness; stiffness was present on admission but reduced over time. Uric acid level is wnl. Differential is gout flare vs. septic arthritis  -no antibiotics on discharge   -follow up with PCP and ortho outpatient      #Systemic inflammatory response syndrome (SIRS).   RESOLVED  Met 3/4 SIRS on admission with elevated WBC 10.92, fever 101.8, tachycardia 94.  Unclear signs of infection as UA and CXR clear.  Denies any diarrhea.  No signs of skin infection on exam. BCx negative. Joint cx negative. UCx showed normal urogenital hilda   -follow up with PCP outpatient     #Gout.   Hx of gout on allopurinol 300mg qd.  Patient states he realized he has not been taking the medication for a month. Uric acid wnl and no crystals seen in arthrocentesis   - restart allopurinol at 50mg qd i/s/o renal impairment  - avoid nsaids and colchicine in setting of renal impairment   - can consider using steroids   -follow up with PCP outpatient     #?MVR (mitral valve replacement)?   Patient and PCP Dr. Alonso's medical office report hx of MVR on coumadin 5mg qd. However, TTE shows mitral valve is repair and annuloplasty, not replacement. Per discussion with patient's cardiologist Dr. Bennett, there is no need to treat with coumadin as the valve is repair and annuloplasty.   - pt initally on home coumadin 5mg nightly until TTE and discussion with Dr. Bennett   - TTE showed valve is repair and annuloplasty and warfarin was not needed   - transitioned to eliquis (to treat afib)    #Afib.   Dr. Alonso's medical office confirms hx of afib with coumadin and digoxin 125mcg qd.  Heart irregular upon auscultation.  EKG with afib.  - c/w home dose digoxin  - transitioned to eliquis upon finding out that warfarin was no longer needed (see MVR? section above)    #Major depression.   Hx of depression and currently reporting feeling depressed and more confused lately.  Denies any SI/HI.  On buproprion 300mg qd per chart review and confirmed with Dr. Alonso's clinic.   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  Has not had a drink in 7-8 days but did drink ~2 bottles of wine at that time.  Is having relationship difficulties with his girlfriend.  Also reports remote hx of marijuana and cocaine use.  Denies sexual activity at this time.  Was on CIWA protocol this admission. Ordered abdominal US 2/2 abdominal distension - WNL  -follow up outpatient    #Anemia.   Hgb 10.0 upon admission.  Unclear baseline.  No active signs of bleeding on exam. Iron studies show anemia of chronic disease, folate and B12 are wnl   - follow up outpatient    #HTN  Med rec confirmed patient is on carvedilol 6.25mg and lasix 20mg qd. Held i/s/o renal insufficiency and sepsis  - restart carvedilol 6.25mg and lasix 20mg qd and follow up outpatient     #HLD  Chart review and med rec with clinic confirm fenofibrate 145mg qd and simvastatin 10mg qhs. Lipid panel wnl this admission.   - c/w home meds fenofibrate 145mg qd and simvastatin 10mg qhs    #BPH (benign prostatic hyperplasia).   Chart review and office med rec confirm patient is on tamsulosin 0.4mg.  - c/w home med tamsulosin 0.4mg          New medications: eliquis   Labs to be followed outpatient: none  Exam to be followed outpatient: none

## 2021-10-19 NOTE — CONSULT NOTE ADULT - SUBJECTIVE AND OBJECTIVE BOX
Orthopaedic Consult Note    HPI  81yMale pmhx significant for gout (has not taken gout meds in 6 weeks), AFib/MVR (on Coumadin, now on heparin drip in house) c/o right knee pain x 3 days. Pt cannot recall acute traumatic event prior to pain starting. States he was doing calf raises in [place] on Sunday, and later at home had increased right knee pain. He ambulates with a cane at baseline - states he hasn't ambulated since leaving [place] on Sunday. Pt denies fevers/chills at home - had a low grade fever this morning 100.9 which has since resolved. Pt was initially seen at [facility] where a right knee aspiration was performed. Patient admitted to [facility] as patient lives alone in apartment complex and for treatment of acute gout flare vs r/o septic right knee. Patient denies numbness/tingling of bilateral lower extremities. Denies CP, SOB, N/V, tactile fevers, calf pain.  Pt is on Vanc/Zosyn/Allopurinol per primary team      PAST MEDICAL & SURGICAL HISTORY:  Depression    Hypertension    HLD (hyperlipidemia)    Afib    Cancer of kidney    Gout    S/P MVR (mitral valve repair)    H/O left nephrectomy      Allergies    No Known Allergies    Intolerances      MEDICATIONS  (STANDING):  allopurinol 50 milliGRAM(s) Oral daily  buPROPion XL (24-Hour) . 300 milliGRAM(s) Oral daily  digoxin     Tablet 125 MICROGram(s) Oral daily  fenofibrate Tablet 145 milliGRAM(s) Oral daily  heparin  Infusion 1400 Unit(s)/Hr (14 mL/Hr) IV Continuous <Continuous>  influenza  Vaccine (HIGH DOSE) 0.7 milliLiter(s) IntraMuscular once  piperacillin/tazobactam IVPB.. 3.375 Gram(s) IV Intermittent every 6 hours  polyethylene glycol 3350 17 Gram(s) Oral two times a day  senna 2 Tablet(s) Oral at bedtime  simvastatin 10 milliGRAM(s) Oral at bedtime  tamsulosin 0.4 milliGRAM(s) Oral at bedtime  vancomycin  IVPB 1000 milliGRAM(s) IV Intermittent every 24 hours      Vital Signs Last 24 Hrs  T(C): 37 (19 Oct 2021 08:34), Max: 38.8 (18 Oct 2021 15:43)  T(F): 98.6 (19 Oct 2021 08:34), Max: 101.8 (18 Oct 2021 15:43)  HR: 87 (19 Oct 2021 08:36) (74 - 95)  BP: 113/61 (19 Oct 2021 08:36) (109/67 - 162/80)  BP(mean): --  RR: 17 (19 Oct 2021 08:36) (16 - 18)  SpO2: 98% (19 Oct 2021 08:36) (94% - 98%)    Physical Exam:     Pt laying comfortably in bed, NAD, A&Ox4  Skin warm and well perfused; Bilateral lower extremities dusky red   No visible erythema/ecchymoses of bilateral lower extremities   Right knee does not feel warm in comparison to left   Right knee suprapatellar swelling appreciated, iodine and band aid at superior lateral aspect of knee from prior tap  Right knee medial and lateral joint lines nontender to palpation  Pt able to actively range right knee to approximately 45 degrees  Passive range of motion between full extension to around 90 degrees - pt reports pain relief with this maneuver  No pain elicited with varus or valgus stress of right knee   Calves soft and nontender to palpation  EHL/FHL/TA/GS firing bilateral lower extremities  SLT in tact to distal bilateral lower extremities   Painless and full active ROM of left knee   Brisk capillary refill distal bilateral lower extremities                             9.5    9.56  )-----------( 145      ( 19 Oct 2021 08:33 )             30.2     10-19    136  |  104  |  22  ----------------------------<  140<H>  3.7   |  22  |  1.38<H>    Ca    9.0      19 Oct 2021 08:33  Phos  2.4     10-19  Mg     1.8     10-19    TPro  7.4  /  Alb  3.7  /  TBili  1.2  /  DBili  0.5<H>  /  AST  21  /  ALT  12  /  AlkPhos  56  10-19    Imaging:   Xray right knee: large joint effusion with significant medial compartment narrowing, no fracture appreciated     Right knee arthrocentesis 10/18/21  Cell count 27k  < 25% neutrophils  Negative crystals     ESR 61         A/P: 81yMale w/ right knee pain, acute gout flare vs r/o septic arthritis right knee   Discussed with Dr. Liu  Low clinical suspicion for septic knee at this time  Continue to treat as acute gout flare until right knee arthrocentesis cultures finalize  WBAT, PT  Pain control as needed  Will continue to follow

## 2021-10-19 NOTE — DISCHARGE NOTE PROVIDER - PROVIDER TOKENS
PROVIDER:[TOKEN:[[id number]],SCHEDULEDAPPT:[10/25/2021],SCHEDULEDAPPTTIME:[11:00 AM],ESTABLISHEDPATIENT:[T]] PROVIDER:[TOKEN:[[id number]:[id number]],SCHEDULEDAPPT:[10/25/2021],SCHEDULEDAPPTTIME:[11:00 AM],ESTABLISHEDPATIENT:[T]],FREE:[LAST:[Alexa],FIRST:[Hansel],PHONE:[phone]],FAX:[(   )    -],ADDRESS:[[address] [place] [address], [address] [place], [place], [facility]],SCHEDULEDAPPT:[10/27/2021],SCHEDULEDAPPTTIME:[11:15 AM]] PROVIDER:[TOKEN:[[id number]:[id number]],SCHEDULEDAPPT:[10/27/2021],SCHEDULEDAPPTTIME:[09:00 AM],ESTABLISHEDPATIENT:[T]],FREE:[LAST:[Alexa],FIRST:[Hansel],PHONE:[phone]:[(   )    -],ADDRESS:[[address] [place] [address], [address] [place], [place], [facility]],SCHEDULEDAPPT:[10/27/2021],SCHEDULEDAPPTTIME:[11:15 AM]]

## 2021-10-19 NOTE — H&P ADULT - PROBLEM SELECTOR PLAN 10
Denies hx of HTN although per chart review on carvediolol 6.25mg and lasix 20mg qd.  - hold i/s/o renal insufficiency and sepsis  - monitor BP  - official med rec    #HLD  Denies hx of HLD although per chart review on fenofibrate 145mg qd and simvastatin 10mg qhs.  - c/w home meds  - AM lipid panel  - official [facility] rec Denies hx of HTN although per chart review on carvedilol 6.25mg and lasix 20mg qd.  - hold i/s/o renal insufficiency and sepsis  - monitor BP  - official med rec    #HLD  Denies hx of HLD although per chart review on fenofibrate 145mg qd and simvastatin 10mg qhs.  - c/w home meds  - AM lipid panel  - official med rec

## 2021-10-19 NOTE — H&P ADULT - PROBLEM SELECTOR PLAN 6
Hx of depression and currently reporting feeling depressed and more confused lately.  Denies any SI/HI.  On buproprion 300mg qd per chart review.  - AM TSH, B12  - psych consult  - c/w home med  - official med rec    #Substance abuse  States he is a former alcoholic.  Has not had a drink in 7-8 days but did drink ~2 bottles of wine at that time.  Is having relationship difficulties with his girlfriend.  Also reports remote hx of marijuana and cocaine use.  Denies sexual activity at this time.    - low risk CIWA  - psych consult  - HIV screen Hx of depression and currently reporting feeling depressed and more confused lately.  Denies any SI/HI.  On buproprion 300mg qd per chart review.  - AM TSH, B12  - c/w home med  - official med rec    #Substance abuse  States he is a former alcoholic.  Has not had a drink in 7-8 days but did drink ~2 bottles of wine at that time.  Is having relationship difficulties with his girlfriend.  Also reports remote hx of marijuana and cocaine use.  Denies sexual activity at this time.    - low risk CIWA  - psych consult  - HIV screen Hx of depression and currently reporting feeling depressed and more confused lately.  Denies any SI/HI.  On buproprion 300mg qd per chart review.  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  Has not had a drink in 7-8 days but did drink ~2 bottles of wine at that time.  Is having relationship difficulties with his girlfriend.  Also reports remote hx of marijuana and cocaine use.  Denies sexual activity at this time.    - low risk CIWA  - HIV screen, hep panel  - consider psych consult

## 2021-10-19 NOTE — DISCHARGE NOTE PROVIDER - NSDCHHASSISTDEVIC_GEN_ALL_CORE_FT
fall risk, arthritis; pt would benefit from increase in HHA hours due to current limited ambulation capacity

## 2021-10-19 NOTE — DISCHARGE NOTE PROVIDER - NSDCMRMEDTOKEN_GEN_ALL_CORE_FT
buPROPion 300 mg/24 hours (XL) oral tablet, extended release: 1 tab(s) orally every 24 hours  carvedilol 6.25 mg oral tablet: orally every 12 hours  Coumadin 5 mg oral tablet: orally once (at bedtime)  digoxin 125 mcg (0.125 mg) oral tablet: 1 tab(s) orally once a day  fenofibrate 145 mg oral tablet: 1 tab(s) orally once a day  omeprazole 20 mg oral delayed release capsule: orally once a day, As Needed  Senna 8.6 mg oral tablet: 2 tab(s) orally 2 times a day  simvastatin 10 mg oral tablet: 1 tab(s) orally once a day (at bedtime) W &amp; F  tamsulosin 0.4 mg oral capsule: 1 cap(s) orally once a day  Vitamin D3 1250 mcg (50,000 intl units) oral capsule: 1 cap(s) orally once a week   buPROPion 300 mg/24 hours (XL) oral tablet, extended release: 1 tab(s) orally every 24 hours  carvedilol 6.25 mg oral tablet: orally every 12 hours  digoxin 125 mcg (0.125 mg) oral tablet: 1 tab(s) orally once a day  fenofibrate 145 mg oral tablet: 1 tab(s) orally once a day  omeprazole 20 mg oral delayed release capsule: orally once a day, As Needed  Senna 8.6 mg oral tablet: 2 tab(s) orally 2 times a day  simvastatin 10 mg oral tablet: 1 tab(s) orally once a day (at bedtime) W &amp; F  tamsulosin 0.4 mg oral capsule: 1 cap(s) orally once a day  Vitamin D3 1250 mcg (50,000 intl units) oral capsule: 1 cap(s) orally once a week   allopurinol 100 mg oral tablet: 0.5 tab(s) orally once a day   apixaban 5 mg oral tablet: 1 tab(s) orally every 12 hours  buPROPion 300 mg/24 hours (XL) oral tablet, extended release: 1 tab(s) orally every 24 hours  carvedilol 6.25 mg oral tablet: orally every 12 hours  digoxin 125 mcg (0.125 mg) oral tablet: 1 tab(s) orally once a day  fenofibrate 145 mg oral tablet: 1 tab(s) orally once a day  omeprazole 20 mg oral delayed release capsule: orally once a day, As Needed  Senna 8.6 mg oral tablet: 2 tab(s) orally 2 times a day  simvastatin 10 mg oral tablet: 1 tab(s) orally once a day (at bedtime) W &amp; F  tamsulosin 0.4 mg oral capsule: 1 cap(s) orally once a day  Vitamin D3 1250 mcg (50,000 intl units) oral capsule: 1 cap(s) orally once a week

## 2021-10-19 NOTE — H&P ADULT - PROBLEM SELECTOR PLAN 3
Hx of gout on allopurinol 300mg qd.  Patient states he realized he has not been taking the medication for a month.  - restart allopurinol  - f/u uric acid  - colchicine if in flare, as pt with reduced kidney function Hx of gout on allopurinol 300mg qd.  Patient states he realized he has not been taking the medication for a month.  - restart allopurinol at 50mg qd i/s/o renal impairment  - f/u uric acid  - colchicine if in flare, as pt with reduced kidney function

## 2021-10-19 NOTE — H&P ADULT - PROBLEM SELECTOR PLAN 1
Meets 3/4 SIRS with elevated WBC, fever, tachycardia.  Unclear signs of infection as UA and CXR clear.  Denies any diarrhea.  No signs of skin infection on exam.  - f/u BCx  - f/u UCx Presents with acute R knee pain after exercising.  Hx of gout and also states he has a girlfriend (although denies recent sexual activity).  Denies any trauma to knee although pain began after exercising.  Skin w/o signs of cellulitis changes.  Decreased ROM and significant TTP with confined area of swelling above knee on exam.  - XRay knee negative  - Arthrocentesis with many WBC, f/u gram stain and cx  - consider R knee CT with contrast if Cr improves  - restart allopurinol at 50mg qd i/s/o renal impairment  - avoid NSAIDS 2/2 elevated Cr, however if suspicion for gout flare remains high, start colchicine   - f/u gram stain  - f/u uric acid level  - gc/chlamydia screen  - pain control  - ortho consult (r/o infection)

## 2021-10-19 NOTE — H&P ADULT - PROBLEM SELECTOR PLAN 2
Presents with acute R knee pain after exercising.  Hx of gout and also states he has a girlfriend (although denies recent sexual activity).  Denies any trauma to knee although pain began after exercising.  Skin w/o signs of cellulitis changes.  Decreased ROM and significant TTP with confined area of swelling above knee on exam.  - XRay knee negative  - Arthrocentesis with many WBC, f/u gram stain and cx  - R knee U/S  - restart allopurinol 300mg qd  - avoid NSAIDS 2/2 elevated Cr, however if suspicion for gout flare remains high, start colchicine   - f/u gram stain  - f/u uric acid level  - gc/chlamydia screen  - pain control Presents with acute R knee pain after exercising.  Hx of gout and also states he has a girlfriend (although denies recent sexual activity).  Denies any trauma to knee although pain began after exercising.  Skin w/o signs of cellulitis changes.  Decreased ROM and significant TTP with confined area of swelling above knee on exam.  - XRay knee negative  - Arthrocentesis with many WBC, f/u gram stain and cx  - consider R knee CT with contrast if Cr improves  - restart allopurinol at 50mg qd i/s/o renal impairment  - avoid NSAIDS 2/2 elevated Cr, however if suspicion for gout flare remains high, start colchicine   - f/u gram stain  - f/u uric acid level  - gc/chlamydia screen  - pain control  - ortho consult (r/o infection) Meets 3/4 SIRS with elevated WBC, fever, tachycardia.  Unclear signs of infection as UA and CXR clear.  Denies any diarrhea.  No signs of skin infection on exam.  - f/u BCx  - f/u UCx

## 2021-10-19 NOTE — CONSULT NOTE ADULT - SUBJECTIVE AND OBJECTIVE BOX
Patient is a 81y old  Male who presents with a chief complaint of knee pain (19 Oct 2021 10:04)       HPI:  81yoM poor historian with PMHx gout, depression, MVR (, on warfarin), L nephrectomy 2/2 kidney CA (unclear type), substance abuse, ?HTN, ?Afib, ?HLD, ?BPH presents with R knee pain that began 4 days ago.  He was "exercising" while at [employer], standing up and down on his toes.  He denies any trauma to his knee or recent falls but has fallen 3X in the past month.  He has not been able to walk for 2 days bc of the pain in his knee.  Hehas not been taking his allopurinol for the past month because he didn't realize until recently that he hadn't been taking it.  He also doesn't know all of his medications and states that someone from his apartment complex helps him.  He believes that he "hasn't been able to function (on his own) for a while now".  He has not taken any pain medications since his current injury began.  He does endorse previous gout flares but unable to recall when and which areas of his body were affected.  He states he's been feeling depressed lately and more confused.  He has "fits of anger", is paranoid, and talks to himself a lot.  He denies SI/HI.  His depression worsened lately because his relationship with his girlfriend (40 years younger) is strained.  He denies any recent sexual activity.  He is a former alcoholic but drank ~2 bottles wine for 3 days ~7-8 days ago.  That was the last time he had alcohol.  He denies any smoking and has a remote hx of drug use (marijuana and cocaine).  He denies any fevers, cough, chest pain, urinary symptoms, diarrhea.  He has felt constipated and his last BM was 2 days ago.      In the ED:  Initial vital signs: Tmax: 101.8 F, HR: 94, BP: 127/79, R: 18, SpO2: 98% on RA  ED course:   Labs: significant for WBC 10.92, Hgb 10.0, Plt 147, Bili 1.5, PT/INR 21/1.80, PTT 37.4, BUN/Cr 24/1.50, , arthrocentesis with 100% granulocytes and cloudy fluid  Imaging:  CXR: No acute cardiopulmonary disease process. Cardiomegaly.  RLE U/S: Negative for DVT  XR R knee:  Frontal and lateral views of the right knee demonstrate severe medial compartment narrowing. Large joint effusion. There is small vessel calcification. Appropriate osseous mineralization. No fracture. No dislocation. Limited secondary to positioning.  EKG: afib with LBBB  Medications: 975mg tylenol, 2mg morphine X2, 3.375mg zosyn, 2.3L NS  Consults: none  (19 Oct 2021 00:40)      PAST MEDICAL & SURGICAL HISTORY:  Depression    Hypertension    HLD (hyperlipidemia)    Afib    Cancer of kidney    Gout    S/P MVR (mitral valve repair)    H/O left nephrectomy        MEDICATIONS  (STANDING):  allopurinol 50 milliGRAM(s) Oral daily  buPROPion XL (24-Hour) . 300 milliGRAM(s) Oral daily  digoxin     Tablet 125 MICROGram(s) Oral daily  diphenhydrAMINE 25 milliGRAM(s) Oral once  fenofibrate Tablet 145 milliGRAM(s) Oral daily  heparin  Infusion 1400 Unit(s)/Hr (14 mL/Hr) IV Continuous <Continuous>  influenza  Vaccine (HIGH DOSE) 0.7 milliLiter(s) IntraMuscular once  piperacillin/tazobactam IVPB.. 3.375 Gram(s) IV Intermittent every 6 hours  polyethylene glycol 3350 17 Gram(s) Oral two times a day  senna 2 Tablet(s) Oral at bedtime  simvastatin 10 milliGRAM(s) Oral at bedtime  tamsulosin 0.4 milliGRAM(s) Oral at bedtime  vancomycin  IVPB 1000 milliGRAM(s) IV Intermittent every 24 hours  warfarin 5 milliGRAM(s) Oral once    MEDICATIONS  (PRN):  acetaminophen   Tablet .. 650 milliGRAM(s) Oral every 6 hours PRN Temp greater or equal to 38C (100.4F), Mild Pain (1 - 3)  LORazepam   Injectable 1 milliGRAM(s) IV Push every 2 hours PRN CIWA-Ar score increase by 2 points and a total score of 7 or less  oxyCODONE    IR 5 milliGRAM(s) Oral every 4 hours PRN Severe Pain (7 - 10)  traMADol 25 milliGRAM(s) Oral every 4 hours PRN Moderate Pain (4 - 6)        Social History:                -  Home Living Status:  lives alone in an elevator accessible apartment building         -  Prior Home Care Services:   4 hrs x 7days         Baseline Functional Level Prior to Admission:             - ADL's/ IADL's:  independent         - ambulatory status PTA:  walked with a cane/ rolling walker    FAMILY HISTORY:  No pertinent family history in first degree relatives        CBC Full  -  ( 19 Oct 2021 08:33 )  WBC Count : 9.56 K/uL  RBC Count : 3.41 M/uL  Hemoglobin : 9.5 g/dL  Hematocrit : 30.2 %  Platelet Count - Automated : 145 K/uL  Mean Cell Volume : 88.6 fl  Mean Cell Hemoglobin : 27.9 pg  Mean Cell Hemoglobin Concentration : 31.5 gm/dL  Auto Neutrophil # : 8.97 K/uL  Auto Lymphocyte # : 0.25 K/uL  Auto Monocyte # : 0.17 K/uL  Auto Eosinophil # : 0.17 K/uL  Auto Basophil # : 0.00 K/uL  Auto Neutrophil % : 93.8 %  Auto Lymphocyte % : 2.6 %  Auto Monocyte % : 1.8 %  Auto Eosinophil % : 1.8 %  Auto Basophil % : 0.0 %      10-19    136  |  104  |  22  ----------------------------<  140<H>  3.7   |  22  |  1.38<H>    Ca    9.0      19 Oct 2021 08:33  Phos  2.4     10-19  Mg     1.8     10-19    TPro  7.4  /  Alb  3.7  /  TBili  1.2  /  DBili  0.5<H>  /  AST  21  /  ALT  12  /  AlkPhos  56  10-19      Urinalysis Basic - ( 18 Oct 2021 15:44 )    Color: Yellow / Appearance: Clear / S.015 / pH: x  Gluc: x / Ketone: NEGATIVE  / Bili: NEGATIVE / Urobili: 0.2 E.U./dL   Blood: x / Protein: Trace mg/dL / Nitrite: NEGATIVE   Leuk Esterase: NEGATIVE / RBC: < 5 /HPF / WBC < 5 /HPF   Sq Epi: x / Non Sq Epi: 0-5 /HPF / Bacteria: Present /HPF          Radiology:    < from: Xray Knee 1 or 2 Views, Right (10.18.21 @ 15:55) >  EXAM:  XR KNEE 1-2 VIEWS RT                           PROCEDURE DATE:  10/18/2021          INTERPRETATION:  CLINICAL HISTORY: 81-year-old with pain.        IMPRESSION: Frontal and lateral views of the right knee demonstrate severe medial compartment narrowing. Large joint effusion. There is small vessel calcification. Appropriate osseous mineralization. No fracture. No dislocation. Limited secondary to positioning.              Vital Signs Last 24 Hrs  T(C): 37 (19 Oct 2021 08:34), Max: 38.8 (18 Oct 2021 15:43)  T(F): 98.6 (19 Oct 2021 08:34), Max: 101.8 (18 Oct 2021 15:43)  HR: 87 (19 Oct 2021 08:36) (74 - 95)  BP: 113/61 (19 Oct 2021 08:36) (109/67 - 162/80)  BP(mean): --  RR: 17 (19 Oct 2021 08:36) (16 - 18)  SpO2: 98% (19 Oct 2021 08:36) (94% - 98%)        REVIEW OF SYSTEMS:    CONSTITUTIONAL: No fever, weight loss, or fatigue  EYES: No eye pain, visual disturbances, or discharge  ENMT:  No difficulty hearing, tinnitus, vertigo; No sinus or throat pain  NECK: No pain or stiffness  BREASTS: No pain, masses, or nipple discharge  RESPIRATORY: No cough, wheezing, chills or hemoptysis; No shortness of breath  CARDIOVASCULAR: No chest pain, palpitations, dizziness, or leg swelling  GASTROINTESTINAL: No abdominal or epigastric pain. No nausea, vomiting, or hematemesis; No diarrhea or constipation. No melena or hematochezia.  GENITOURINARY: No dysuria, frequency, hematuria, or incontinence  NEUROLOGICAL: No headaches, memory loss, loss of strength, numbness, or tremors  SKIN: No itching, burning, rashes, or lesions   LYMPH NODES: No enlarged glands  ENDOCRINE: No heat or cold intolerance; No hair loss  MUSCULOSKELETAL:  right knee pain  PSYCHIATRIC: No depression, anxiety, mood swings, or difficulty sleeping  HEME/LYMPH: No easy bruising, or bleeding gums  ALLERGY AND IMMUNOLOGIC: No hives or eczema  VASCULAR: no swelling, erythema,           Physical Exam: 82 yo gentleman lying in semi Balbuena's position, awake, alert, c/o r knee pain    Head: normocephalic, atraumatic    Eyes: PERRLA, EOMI, no nystagmus, sclera anicteric    ENT: nasal discharge, uvula midline, no oropharyngeal erythema/exudate    Neck: supple, negative JVD, negative carotid bruits, no thyromegaly    Chest: CTA bilaterally, neg wheeze/ rhonchi/ rales/ crackles/ egophany    Cardiovascular: regular rate and rhythm, neg murmurs/rubs/gallops    Abdomen: soft, non distended, non tender to palpation in all 4 quadrants, negative rebound/guarding, normal bowel sounds    Extremities: WWP, neg cyanosis/clubbing/edema, negative calf tenderness to palpation, negative Amairani's sign    Musculoskeletal: R knee: + suprapatellar effusion, decreased flexion 0-45 deg sec to pain    Neurologic Exam:    Alert and oriented x 3    Motor Exam:    Right UE:              no focal weakness > 4/5    Left UE:                no focal weakness > 4/5    Right LE:               no focal weakness > 4/5 , except for quad 3+/5 sec to pain    Left LE:                 no focal weakness > 4/5               Sensation:           intact to light touch x 4 extremities                               Gait:  not tested        PM&R Impression:    1) R knee effusion/ s/p arthrocentesis  2) OA knees    Recommendations/ Plan :    1) Physical therapy focusing on therapeutic exercises, bed mobility/transfer out of bed evaluation, progressive ambulation with assistive devices prn.    2) Anticipated Disposition Plan/Recs:   pending functional progress                     Patient is a 81y old  Male who presents with a chief complaint of knee pain (19 Oct 2021 10:04)       HPI:  81yoM poor historian with PMHx gout, depression, MVR (, on warfarin), L nephrectomy 2/2 kidney CA (unclear type), substance abuse, ?HTN, ?Afib, ?HLD, ?BPH presents with R knee pain that began 4 days ago.  He was "exercising" while at [employer], standing up and down on his toes.  He denies any trauma to his knee or recent falls but has fallen 3X in the past month.  He has not been able to walk for 2 days bc of the pain in his knee.  Hehas not been taking his allopurinol for the past month because he didn't realize until recently that he hadn't been taking it.  He also doesn't know all of his medications and states that someone from his apartment complex helps him.  He believes that he "hasn't been able to function (on his own) for a while now".  He has not taken any pain medications since his current injury began.  He does endorse previous gout flares but unable to recall when and which areas of his body were affected.  He states he's been feeling depressed lately and more confused.  He has "fits of anger", is paranoid, and talks to himself a lot.  He denies SI/HI.  His depression worsened lately because his relationship with his girlfriend (40 years younger) is strained.  He denies any recent sexual activity.  He is a former alcoholic but drank ~2 bottles wine for 3 days ~7-8 days ago.  That was the last time he had alcohol.  He denies any smoking and has a remote hx of drug use (marijuana and cocaine).  He denies any fevers, cough, chest pain, urinary symptoms, diarrhea.  He has felt constipated and his last BM was 2 days ago.      In the ED:  Initial vital signs: Tmax: 101.8 F, HR: 94, BP: 127/79, R: 18, SpO2: 98% on RA  ED course:   Labs: significant for WBC 10.92, Hgb 10.0, Plt 147, Bili 1.5, PT/INR 21/1.80, PTT 37.4, BUN/Cr 24/1.50, , arthrocentesis with 100% granulocytes and cloudy fluid  Imaging:  CXR: No acute cardiopulmonary disease process. Cardiomegaly.  RLE U/S: Negative for DVT  XR R knee:  Frontal and lateral views of the right knee demonstrate severe medial compartment narrowing. Large joint effusion. There is small vessel calcification. Appropriate osseous mineralization. No fracture. No dislocation. Limited secondary to positioning.  EKG: afib with LBBB  Medications: 975mg tylenol, 2mg morphine X2, 3.375mg zosyn, 2.3L NS  Consults: none  (19 Oct 2021 00:40)      PAST MEDICAL & SURGICAL HISTORY:  Depression    Hypertension    HLD (hyperlipidemia)    Afib    Cancer of kidney    Gout    S/P MVR (mitral valve repair)    H/O left nephrectomy        MEDICATIONS  (STANDING):  allopurinol 50 milliGRAM(s) Oral daily  buPROPion XL (24-Hour) . 300 milliGRAM(s) Oral daily  digoxin     Tablet 125 MICROGram(s) Oral daily  diphenhydrAMINE 25 milliGRAM(s) Oral once  fenofibrate Tablet 145 milliGRAM(s) Oral daily  heparin  Infusion 1400 Unit(s)/Hr (14 mL/Hr) IV Continuous <Continuous>  influenza  Vaccine (HIGH DOSE) 0.7 milliLiter(s) IntraMuscular once  piperacillin/tazobactam IVPB.. 3.375 Gram(s) IV Intermittent every 6 hours  polyethylene glycol 3350 17 Gram(s) Oral two times a day  senna 2 Tablet(s) Oral at bedtime  simvastatin 10 milliGRAM(s) Oral at bedtime  tamsulosin 0.4 milliGRAM(s) Oral at bedtime  vancomycin  IVPB 1000 milliGRAM(s) IV Intermittent every 24 hours  warfarin 5 milliGRAM(s) Oral once    MEDICATIONS  (PRN):  acetaminophen   Tablet .. 650 milliGRAM(s) Oral every 6 hours PRN Temp greater or equal to 38C (100.4F), Mild Pain (1 - 3)  LORazepam   Injectable 1 milliGRAM(s) IV Push every 2 hours PRN CIWA-Ar score increase by 2 points and a total score of 7 or less  oxyCODONE    IR 5 milliGRAM(s) Oral every 4 hours PRN Severe Pain (7 - 10)  traMADol 25 milliGRAM(s) Oral every 4 hours PRN Moderate Pain (4 - 6)        Social History:                -  Home Living Status:  lives alone in an elevator accessible apartment building         -  Prior Home Care Services:   4 hrs x 7days         Baseline Functional Level Prior to Admission:             - ADL's/ IADL's:  independent         - ambulatory status PTA:  walked with a cane/ rolling walker    FAMILY HISTORY:  No pertinent family history in first degree relatives        CBC Full  -  ( 19 Oct 2021 08:33 )  WBC Count : 9.56 K/uL  RBC Count : 3.41 M/uL  Hemoglobin : 9.5 g/dL  Hematocrit : 30.2 %  Platelet Count - Automated : 145 K/uL  Mean Cell Volume : 88.6 fl  Mean Cell Hemoglobin : 27.9 pg  Mean Cell Hemoglobin Concentration : 31.5 gm/dL  Auto Neutrophil # : 8.97 K/uL  Auto Lymphocyte # : 0.25 K/uL  Auto Monocyte # : 0.17 K/uL  Auto Eosinophil # : 0.17 K/uL  Auto Basophil # : 0.00 K/uL  Auto Neutrophil % : 93.8 %  Auto Lymphocyte % : 2.6 %  Auto Monocyte % : 1.8 %  Auto Eosinophil % : 1.8 %  Auto Basophil % : 0.0 %      10-19    136  |  104  |  22  ----------------------------<  140<H>  3.7   |  22  |  1.38<H>    Ca    9.0      19 Oct 2021 08:33  Phos  2.4     10-19  Mg     1.8     10-19    TPro  7.4  /  Alb  3.7  /  TBili  1.2  /  DBili  0.5<H>  /  AST  21  /  ALT  12  /  AlkPhos  56  10-19      Urinalysis Basic - ( 18 Oct 2021 15:44 )    Color: Yellow / Appearance: Clear / S.015 / pH: x  Gluc: x / Ketone: NEGATIVE  / Bili: NEGATIVE / Urobili: 0.2 E.U./dL   Blood: x / Protein: Trace mg/dL / Nitrite: NEGATIVE   Leuk Esterase: NEGATIVE / RBC: < 5 /HPF / WBC < 5 /HPF   Sq Epi: x / Non Sq Epi: 0-5 /HPF / Bacteria: Present /HPF          Radiology:    < from: Xray Knee 1 or 2 Views, Right (10.18.21 @ 15:55) >  EXAM:  XR KNEE 1-2 VIEWS RT                           PROCEDURE DATE:  10/18/2021          INTERPRETATION:  CLINICAL HISTORY: 81-year-old with pain.        IMPRESSION: Frontal and lateral views of the right knee demonstrate severe medial compartment narrowing. Large joint effusion. There is small vessel calcification. Appropriate osseous mineralization. No fracture. No dislocation. Limited secondary to positioning.              Vital Signs Last 24 Hrs  T(C): 37 (19 Oct 2021 08:34), Max: 38.8 (18 Oct 2021 15:43)  T(F): 98.6 (19 Oct 2021 08:34), Max: 101.8 (18 Oct 2021 15:43)  HR: 87 (19 Oct 2021 08:36) (74 - 95)  BP: 113/61 (19 Oct 2021 08:36) (109/67 - 162/80)  BP(mean): --  RR: 17 (19 Oct 2021 08:36) (16 - 18)  SpO2: 98% (19 Oct 2021 08:36) (94% - 98%)        REVIEW OF SYSTEMS:    CONSTITUTIONAL: No fever, weight loss, or fatigue  EYES: No eye pain, visual disturbances, or discharge  ENMT:  No difficulty hearing, tinnitus, vertigo; No sinus or throat pain  NECK: No pain or stiffness  BREASTS: No pain, masses, or nipple discharge  RESPIRATORY: No cough, wheezing, chills or hemoptysis; No shortness of breath  CARDIOVASCULAR: No chest pain, palpitations, dizziness, or leg swelling  GASTROINTESTINAL: No abdominal or epigastric pain. No nausea, vomiting, or hematemesis; No diarrhea or constipation. No melena or hematochezia.  GENITOURINARY: No dysuria, frequency, hematuria, or incontinence  NEUROLOGICAL: No headaches, memory loss, loss of strength, numbness, or tremors  SKIN: No itching, burning, rashes, or lesions   LYMPH NODES: No enlarged glands  ENDOCRINE: No heat or cold intolerance; No hair loss  MUSCULOSKELETAL:  right knee pain  PSYCHIATRIC: No depression, anxiety, mood swings, or difficulty sleeping  HEME/LYMPH: No easy bruising, or bleeding gums  ALLERGY AND IMMUNOLOGIC: No hives or eczema  VASCULAR: no swelling, erythema,           Physical Exam: 82 yo gentleman lying in semi Balbuena's position, awake, alert, c/o r knee pain    Head: normocephalic, atraumatic    Eyes: PERRLA, EOMI, no nystagmus, sclera anicteric, + exopthalmos    ENT: nasal discharge, uvula midline, no oropharyngeal erythema/exudate    Neck: supple, negative JVD, negative carotid bruits, no thyromegaly    Chest: CTA bilaterally, neg wheeze/ rhonchi/ rales/ crackles/ egophany    Cardiovascular:  irregular rate and rhythm, neg murmurs/rubs/gallops    Abdomen: soft, non distended, non tender to palpation in all 4 quadrants, negative rebound/guarding, normal bowel sounds    Extremities: WWP, neg cyanosis/clubbing/edema, negative calf tenderness to palpation, negative Amairani's sign    Musculoskeletal: R knee: + suprapatellar effusion, decreased flexion 0-45 deg sec to pain    Neurologic Exam:    Alert and oriented x 3    Motor Exam:    Right UE:              no focal weakness > 4/5    Left UE:                no focal weakness > 4/5    Right LE:               no focal weakness > 4/5 , except for quad 3+/5 sec to pain    Left LE:                 no focal weakness > 4/5               Sensation:           intact to light touch x 4 extremities                               Gait:  not tested        PM&R Impression:    1) R knee effusion/ s/p arthrocentesis  2) OA knees    Recommendations/ Plan :    1) Physical therapy focusing on therapeutic exercises, bed mobility/transfer out of bed evaluation, progressive ambulation with assistive devices prn.    2) Anticipated Disposition Plan/Recs:   pending functional progress

## 2021-10-19 NOTE — PROGRESS NOTE ADULT - PROBLEM SELECTOR PLAN 4
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 INR upon admission 1.8.  Unclear type of valve and duration of therapy, thus unable to determine appropriate goal (2-3 vs. 2.5-3.5).  - obtain collateral regarding valve hx (from his facility)   - restarted home coumadin 5mg nightly   - Ordered TTE, f/u

## 2021-10-19 NOTE — DISCHARGE NOTE PROVIDER - NSDCCPCAREPLAN_GEN_ALL_CORE_FT
PRINCIPAL DISCHARGE DIAGNOSIS  Diagnosis: Fever  Assessment and Plan of Treatment:       SECONDARY DISCHARGE DIAGNOSES  Diagnosis: Knee effusion, right  Assessment and Plan of Treatment:     Diagnosis: Right knee pain  Assessment and Plan of Treatment:      PRINCIPAL DISCHARGE DIAGNOSIS  Diagnosis: Right knee pain  Assessment and Plan of Treatment:       SECONDARY DISCHARGE DIAGNOSES  Diagnosis: Gout  Assessment and Plan of Treatment:     Diagnosis: Afib  Assessment and Plan of Treatment:      PRINCIPAL DISCHARGE DIAGNOSIS  Diagnosis: Right knee pain  Assessment and Plan of Treatment: Your      SECONDARY DISCHARGE DIAGNOSES  Diagnosis: Systemic inflammatory response syndrome (SIRS)  Assessment and Plan of Treatment:     Diagnosis: Gout  Assessment and Plan of Treatment:     Diagnosis: Afib  Assessment and Plan of Treatment:      PRINCIPAL DISCHARGE DIAGNOSIS  Diagnosis: Right knee pain  Assessment and Plan of Treatment: Your right knee pain could be due to either an acute gout flair or septic arthritis. Gout is a form of arthritis characterized by severe pain, redness, and tenderness in the joints. Septic arthritis is an infection in the joint. We treated you with antibiotics for possible septic arthritis. We also treated your gout with gout medications. Please follow up with your primary care doctor for further management.      SECONDARY DISCHARGE DIAGNOSES  Diagnosis: Systemic inflammatory response syndrome (SIRS)  Assessment and Plan of Treatment:     Diagnosis: Gout  Assessment and Plan of Treatment:     Diagnosis: Afib  Assessment and Plan of Treatment:      PRINCIPAL DISCHARGE DIAGNOSIS  Diagnosis: Right knee pain  Assessment and Plan of Treatment: Your right knee pain could be due to either an acute gout flair or septic arthritis. Gout is a form of arthritis characterized by severe pain, redness, and tenderness in the joints. Septic arthritis is an infection in the joint. We treated you with antibiotics for possible septic arthritis. We also treated your gout with gout medications. We will discharge  you with _______. Please take these medications. Please follow up with your primary care doctor for further management.      SECONDARY DISCHARGE DIAGNOSES  Diagnosis: Gout  Assessment and Plan of Treatment: Gout is a form of arthritis characterized by severe pain, redness, and tenderness in the joints. Please continue to take your gout medications and follow up with your primary care doctor for further management.    Diagnosis: Afib  Assessment and Plan of Treatment: You have a known diagnosis of atrial fibrillation prior to your admission. This condition, if not treated, increases your risk of stroke or heart attack. Please continue to take _____. Please make sure to continue taking these medications to avoid developing blood clots and to lower your risk of stroke. Additionally be sure to follow up with your primary care physician (and/or cardiologist) on a regular basis to ensure that this condition does not require changes to the dose or type of medication.       PRINCIPAL DISCHARGE DIAGNOSIS  Diagnosis: Right knee pain  Assessment and Plan of Treatment: Your right knee pain could be due to either an acute gout flair or septic arthritis. Gout is a form of arthritis characterized by severe pain, redness, and tenderness in the joints. Septic arthritis is an infection in the joint. We treated you with antibiotics for possible septic arthritis. We also treated your gout with gout medications. We made appointments with your primary care doctor Dr. Alonso and orthopedic surgery Dr. Liu. Please follow up with both doctors for further management.      SECONDARY DISCHARGE DIAGNOSES  Diagnosis: Gout  Assessment and Plan of Treatment: Gout is a form of arthritis characterized by severe pain, redness, and tenderness in the joints. Please continue to take your gout medications and follow up with Dr. Alonso for further management.    Diagnosis: Afib  Assessment and Plan of Treatment: You have a known diagnosis of atrial fibrillation prior to your admission. This condition, if not treated, increases your risk of stroke or heart attack. Please continue to take your home medications. Please make sure to continue taking these medications to avoid developing blood clots and to lower your risk of stroke. Additionally be sure to follow up with your primary care physician (and/or cardiologist) on a regular basis to ensure that this condition does not require changes to the dose or type of medication.      Diagnosis: HTN (hypertension)  Assessment and Plan of Treatment:     Diagnosis: Major depression  Assessment and Plan of Treatment:     Diagnosis: BPH (benign prostatic hyperplasia)  Assessment and Plan of Treatment:     Diagnosis: Anemia  Assessment and Plan of Treatment:      PRINCIPAL DISCHARGE DIAGNOSIS  Diagnosis: Right knee pain  Assessment and Plan of Treatment: Your right knee pain could be due to either an acute gout flair or septic arthritis. Gout is a form of arthritis characterized by severe pain, redness, and tenderness in the joints. Septic arthritis is an infection in the joint. We treated you with antibiotics for possible septic arthritis. We also treated your gout with gout medications. We made appointments with your primary care doctor Dr. Alonso and orthopedic surgery Dr. Liu. Please follow up with both doctors for further management.      SECONDARY DISCHARGE DIAGNOSES  Diagnosis: Gout  Assessment and Plan of Treatment: Gout is a form of arthritis characterized by severe pain, redness, and tenderness in the joints. Please continue to take your gout medications and follow up with Dr. Alonso for further management.    Diagnosis: Afib  Assessment and Plan of Treatment: You have a known diagnosis of atrial fibrillation prior to your admission. This condition, if not treated, increases your risk of stroke or heart attack. Please continue to take your home medications. Please make sure to continue taking these medications to avoid developing blood clots and to lower your risk of stroke. Additionally be sure to follow up with your primary care physician (and/or cardiologist) on a regular basis to ensure that this condition does not require changes to the dose or type of medication.      Diagnosis: HTN (hypertension)  Assessment and Plan of Treatment: You have a known history of high blood pressure prior to your admission. To manage this you are on a medication called ____. High blood pressure can cause damage to your heart and kidneys and increases your risk of heart attack and stroke. To avoid this, It is important that you continue to take this medication when you are discharged so that you can continue to control your blood pressure. Additionally be sure to follow up with your primary care physician on a regular basis to make sure your blood pressure continues to be well controlled. If you experience symptoms such as but not limited to: sudden onset blurry vision, nausea, vomiting, chest pain, shortness of breath, or palpitations, please go to the nearest emergency room.      Diagnosis: Major depression  Assessment and Plan of Treatment: Depression is a medical condition that causes feelings of sadness or hopelessness that do not go away. Depression may cause you to lose interest in things you used to enjoy and these feelings may interfere with your daily life. Please call 911 if you think about harming yourself or someone else. Please take your medication on a regular basis to improve or balance your mood and have regular follow-up with your PCP or psychiatrist so they can manage your care.      Diagnosis: BPH (benign prostatic hyperplasia)  Assessment and Plan of Treatment: Benign prostatic hyperplasia, also called "BPH," is a common problem. any men with BPH have no symptoms at all. When symptoms do occur, they can include needing to urinate often, especially at night, having trouble starting to urinate (this means that you might have to wait or strain before urine will come out), having a weak urine stream, leaking or dribbling urine, feeling as though your bladder is not empty even after you urinate. In rare cases, BPH makes it so a man cannot urinate at all. This is a serious problem. If you cannot urinate at all, call your doctor right away. Please continue to take __________.      Diagnosis: Anemia  Assessment and Plan of Treatment: Anemia is the medical term for when a person has too few red blood cells. Red blood cells are the cells in your blood that carry oxygen. If you have too few red blood cells, your body does not get all the oxygen it needs. Most people with anemia have no symptoms. They find out they have it after their doctor does blood tests for another reason. People who do have symptoms might feel tired or weak, especially if they try to exercise or have headaches. If you experience these symptoms you should see your primary care provider for further evaluation.       PRINCIPAL DISCHARGE DIAGNOSIS  Diagnosis: Right knee pain  Assessment and Plan of Treatment: Your right knee pain could be due to either an acute gout flair or septic arthritis. Gout is a form of arthritis characterized by severe pain, redness, and tenderness in the joints. Septic arthritis is an infection in the joint. We treated you with antibiotics for possible septic arthritis. We also treated your gout with gout medications. We made appointments with your primary care doctor Dr. Alonso and orthopedic surgery Dr. Liu. Please follow up with both doctors for further management.      SECONDARY DISCHARGE DIAGNOSES  Diagnosis: Gout  Assessment and Plan of Treatment: Gout is a form of arthritis characterized by severe pain, redness, and tenderness in the joints. Please continue to take your gout medications and follow up with Dr. Alonso for further management.    Diagnosis: Afib  Assessment and Plan of Treatment: You have a known diagnosis of atrial fibrillation prior to your admission. This condition, if not treated, increases your risk of stroke or heart attack. Please continue to take digoxin and eliquis. Please make sure to continue taking these medications to avoid developing blood clots and to lower your risk of stroke. Additionally be sure to follow up with your primary care physician (and/or cardiologist) on a regular basis to ensure that this condition does not require changes to the dose or type of medication.      Diagnosis: HTN (hypertension)  Assessment and Plan of Treatment: You have a known history of high blood pressure prior to your admission. To manage this you are on a medication called carvidelilol. High blood pressure can cause damage to your heart and kidneys and increases your risk of heart attack and stroke. To avoid this, It is important that you continue to take this medication when you are discharged so that you can continue to control your blood pressure. Additionally be sure to follow up with your primary care physician on a regular basis to make sure your blood pressure continues to be well controlled. If you experience symptoms such as but not limited to: sudden onset blurry vision, nausea, vomiting, chest pain, shortness of breath, or palpitations, please go to the nearest emergency room.      Diagnosis: Major depression  Assessment and Plan of Treatment: Depression is a medical condition that causes feelings of sadness or hopelessness that do not go away. Depression may cause you to lose interest in things you used to enjoy and these feelings may interfere with your daily life. Please call 911 if you think about harming yourself or someone else. Please take your medication buproprion on a regular basis to improve or balance your mood and have regular follow-up with your PCP or psychiatrist so they can manage your care.      Diagnosis: BPH (benign prostatic hyperplasia)  Assessment and Plan of Treatment: Benign prostatic hyperplasia, also called "BPH," is a common problem. any men with BPH have no symptoms at all. When symptoms do occur, they can include needing to urinate often, especially at night, having trouble starting to urinate (this means that you might have to wait or strain before urine will come out), having a weak urine stream, leaking or dribbling urine, feeling as though your bladder is not empty even after you urinate. In rare cases, BPH makes it so a man cannot urinate at all. This is a serious problem. If you cannot urinate at all, call your doctor right away. Please continue to take flomax.      Diagnosis: Anemia  Assessment and Plan of Treatment: Anemia is the medical term for when a person has too few red blood cells. Red blood cells are the cells in your blood that carry oxygen. If you have too few red blood cells, your body does not get all the oxygen it needs. Most people with anemia have no symptoms. They find out they have it after their doctor does blood tests for another reason. People who do have symptoms might feel tired or weak, especially if they try to exercise or have headaches. If you experience these symptoms you should see your primary care provider for further evaluation.

## 2021-10-19 NOTE — PROGRESS NOTE ADULT - PROBLEM SELECTOR PLAN 2
Meets 3/4 SIRS with elevated WBC, fever, tachycardia.  Unclear signs of infection as UA and CXR clear.  Denies any diarrhea.  No signs of skin infection on exam.  - f/u BCx  - f/u UCx Meets 3/4 SIRS with elevated WBC, fever, tachycardia.  Unclear signs of infection as UA and CXR clear.  Denies any diarrhea.  No signs of skin infection on exam.  - C/w vanc and zosyn, narrow scope pending culture results   - f/u BCx  - f/u UCx

## 2021-10-19 NOTE — H&P ADULT - ATTENDING COMMENTS
reviewed pertinent data , h&p  patient seen and examined overnight   pt a/f R knee pain, inflammatory vs septic?    pt was audible  wheezing at time of my evaluation, c/o left lower lateral chest/ LUQ discomfort; mmm, +right eye strabismus w/ exophthalmos b/l; s1s2, decreased breath sounds b/l, w/ audible wheezing , abdomen is distended but nontend, +R knee is nonerythematous, w/ patient unable to flex knee w/ swelling noted at the lateral and medial superior aspects of the knee; there is bruising noted at the anterior tibial regions of the right lower ext     1. pt in fluid overload, likely from IVFs received at [facility]; ordered for IV lasix 40mgx 1  and duonebs, please obtain ECHO, check ECG and troponins.   2. started vancomycin and restart zosyn in setting of persisting fevers. de-escalate abx pending ctxs; agree w/ CT knee pending Cr improvement , followup ortho recs.   3. monitor renal function   4. start heparin gtt, monitor PTT , unclear hx re: MV replacement, obtain collateral          rest of  plan as above reviewed pertinent data , h&p  patient seen and examined overnight   pt a/f R knee pain, inflammatory vs septic?    pt was audible  wheezing at time of my evaluation, c/o left lower lateral chest/ LUQ discomfort; mmm, +right eye strabismus w/ exophthalmos b/l; s1s2, decreased breath sounds b/l, w/ audible wheezing , abdomen is distended but nontend, +R knee is nonerythematous, w/ patient unable to flex knee w/ swelling noted at the lateral and medial superior aspects of the knee; there is bruising noted at the anterior tibial regions of the right lower ext     1. pt in fluid overload, likely from IVFs received at [facility]; ordered for IV lasix 40mgx 1  and duonebs, please obtain ECHO, check ECG and troponins.   2. started vancomycin and restart zosyn in setting of persisting fevers. de-escalate abx pending ctxs; agree w/ CT knee pending Cr improvement , followup ortho recs.   3. monitor renal function   4. start heparin gtt, monitor PTT , unclear hx re: MV replacement, obtain collateral ; re-start coumadin if no planned intervention          rest of  plan as above

## 2021-10-19 NOTE — DISCHARGE NOTE PROVIDER - CARE PROVIDER_API CALL
RAUL SEGURA  [facility]  [address] [place] [address] 7  [place] [address]  Phone: [phone]  Fax: ()-  Established Patient  Scheduled Appointment: 10/25/2021 11:00 AM   RAUL SEGURA  [facility]  [address] [place] [address] 7  [place] [address]  Phone: ()-  Fax: ()-  Established Patient  Scheduled Appointment: 10/25/2021 11:00 AM    Hansel Liu  [address] [place], [address]  Phone: [phone]  Fax: (   )    -  Scheduled Appointment: 10/27/2021 11:15 AM   RAUL SEGURA  [facility]  [address] [place] [address] 7  [place] [address]  Phone: ()-  Fax: ()-  Established Patient  Scheduled Appointment: 10/27/2021 09:00 AM    Hansel Liu  [address] [place], [address]  Phone: [phone]  Fax: (   )    -  Scheduled Appointment: 10/27/2021 11:15 AM

## 2021-10-19 NOTE — H&P ADULT - NSHPLABSRESULTS_GEN_ALL_CORE
10.0   10.92 )-----------( 147      ( 18 Oct 2021 15:38 )             30.3       10-18    138  |  104  |  25<H>  ----------------------------<  113<H>  3.8   |  27  |  1.50<H>    Ca    9.4      18 Oct 2021 15:38  Mg     2.0     10-18    TPro  7.7  /  Alb  3.5  /  TBili  1.5<H>  /  DBili  x   /  AST  19  /  ALT  23  /  AlkPhos  65  10-18              Urinalysis Basic - ( 18 Oct 2021 15:44 )    Color: Yellow / Appearance: Clear / S.015 / pH: x  Gluc: x / Ketone: NEGATIVE  / Bili: NEGATIVE / Urobili: 0.2 E.U./dL   Blood: x / Protein: Trace mg/dL / Nitrite: NEGATIVE   Leuk Esterase: NEGATIVE / RBC: < 5 /HPF / WBC < 5 /HPF   Sq Epi: x / Non Sq Epi: 0-5 /HPF / Bacteria: Present /HPF        PT/INR - ( 18 Oct 2021 15:38 )   PT: 21.0 sec;   INR: 1.80          PTT - ( 18 Oct 2021 15:38 )  PTT:37.4 sec    Lactate Trend  10-18 @ 15:38 Lactate:0.9       CARDIAC MARKERS ( 18 Oct 2021 15:38 )  x     / x     / 53 U/L / x     / x            CAPILLARY BLOOD GLUCOSE

## 2021-10-19 NOTE — H&P ADULT - PROBLEM SELECTOR PLAN 8
Elevated bilirubin 1.5.  Abd nontender.  Cotto's sign negative.  - trend CMP  - f/u AM fractionated bilirubin Hx of renal cancer (unclear type) with L nephrectomy.  Baseline Cr unclear  Cr upon admission 1.50.  UA negative.  - avoid nephrotoxic meds  - renally adjust meds  - no NSAIDS  - urine lytes    #Elevated bilirubin   Elevated bilirubin 1.5 upon admission.  Abd nontender.  Cotto's sign negative.  - trend CMP  - f/u AM fractionated bilirubin Hx of renal cancer (unclear type) with L nephrectomy.  Baseline Cr unclear  Cr upon admission 1.50.  UA negative.  - avoid nephrotoxic meds  - renally adjust meds  - no NSAIDS  - s/p 2.3L NS in ED  - f/u urine lytes    #Elevated bilirubin   Elevated bilirubin 1.5 upon admission.  Abd nontender.  Cotto's sign negative.  - trend CMP  - f/u AM fractionated bilirubin

## 2021-10-19 NOTE — H&P ADULT - NSHPSOCIALHISTORY_GEN_ALL_CORE
Occupation:  Living situation:  Tobacco:  Alcohol:   Illicit drug use: Living situation: alone, apt  Tobacco: denies  Alcohol: former alcoholic, ?current alcohol consumption  Illicit drug use: former cocaine and marijuana

## 2021-10-19 NOTE — H&P ADULT - NSICDXPASTMEDICALHX_GEN_ALL_CORE_FT
PAST MEDICAL HISTORY:  Afib     Cancer of kidney     Depression     Gout     HLD (hyperlipidemia)     Hypertension

## 2021-10-19 NOTE — H&P ADULT - PROBLEM SELECTOR PLAN 12
F: none  E: replete K<4, Mg<2  N: DASH  GI proph: none  DVT proph: warfarin (after establishing dosing)    Dispo: [facility] F: none  E: replete K<4, Mg<2  N: DASH  GI proph: none  DVT proph: hep gtt  Dispo: [facility]

## 2021-10-19 NOTE — H&P ADULT - PROBLEM SELECTOR PLAN 5
Unclear hx of afib, although reports he's on digoxin (chart review 125mcg qd).  - AM dig level  - c/w home dose  - official med rec Unclear hx of afib, although reports he's on digoxin (chart review 125mcg qd).  Heart irregular upon auscultation.  EKG with afib.  - AM dig level  - repeat EKG in AM  - c/w home dose  - official med rec Unclear hx of afib, although reports he's on coumadin and digoxin (chart review 125mcg qd).  Heart irregular upon auscultation.  EKG with afib.  - AM dig level  - repeat EKG in AM  - c/w home dose digoxin  - start hep gtt for full AC  - official med rec

## 2021-10-19 NOTE — H&P ADULT - PROBLEM SELECTOR PLAN 4
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 INR upon admission 1.8.  Unclear type of valve and duration of therapy, thus unable to determine appropriate goal (2-3 vs. 2.5-3.5).  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 INR upon admission 1.8.  Unclear type of valve and duration of therapy, thus unable to determine appropriate goal (2-3 vs. 2.5-3.5).  - obtain collateral regarding valve hx  - hold coumadin and start hep gtt (renal function and ?ortho procedure)

## 2021-10-19 NOTE — H&P ADULT - NSHPPHYSICALEXAM_GEN_ALL_CORE
VITALS:   T(C): 36.9 (10-19-21 @ 00:42), Max: 38.8 (10-18-21 @ 15:43)  HR: 95 (10-19-21 @ 00:42) (74 - 95)  BP: 153/79 (10-19-21 @ 00:42) (127/79 - 153/79)  RR: 18 (10-19-21 @ 00:42) (16 - 18)  SpO2: 94% (10-19-21 @ 00:42) (94% - 98%)    GENERAL: NAD, lying in bed comfortably  HEAD:  Atraumatic, Normocephalic  EYES: EOMI, PERRLA, conjunctiva and sclera clear  ENT: Moist mucous membranes  NECK: Supple, No JVD  CHEST/LUNG: Clear to auscultation bilaterally; No rales, rhonchi, wheezing, or rubs. Unlabored respirations  HEART: Regular rate and rhythm; No murmurs, rubs, or gallops  ABDOMEN: BSx4; Soft, nontender, nondistended  EXTREMITIES:  2+ Peripheral Pulses, brisk capillary refill. No clubbing, cyanosis, or edema  NERVOUS SYSTEM:  A&Ox3, no focal deficits   SKIN: No rashes or lesions VITALS:   T(C): 36.9 (10-19-21 @ 00:42), Max: 38.8 (10-18-21 @ 15:43)  HR: 95 (10-19-21 @ 00:42) (74 - 95)  BP: 153/79 (10-19-21 @ 00:42) (127/79 - 153/79)  RR: 18 (10-19-21 @ 00:42) (16 - 18)  SpO2: 94% (10-19-21 @ 00:42) (94% - 98%)    GENERAL: NAD, lying in bed comfortably  HEAD:  Atraumatic, Normocephalic  EYES: PERRLA, exophthalmos, conjunctiva and sclera clear  ENT: Moist mucous membranes  NECK: Supple  CHEST/LUNG: Clear to auscultation bilaterally; No rales, rhonchi, wheezing, or rubs. Unlabored respirations  HEART: Iregular rate and rhythm; No murmurs.  ABDOMEN: BSx4; Soft, nontender, distended.  Negative benoit's sign.  EXTREMITIES:  2+ Peripheral Pulses. No edema.  Significant varicosities.  R knee with palpable swelling above knee.  TTP around knee cap.  No signs of cellulitis changes.  Small bandage over lateral arthrocentesis site.  Unable to flex or extend R knee.  Unable to flex R hip.    NERVOUS SYSTEM:  A&Ox3, no focal deficits   SKIN: No rashes or lesions VITALS:   T(C): 36.9 (10-19-21 @ 00:42), Max: 38.8 (10-18-21 @ 15:43)  HR: 95 (10-19-21 @ 00:42) (74 - 95)  BP: 153/79 (10-19-21 @ 00:42) (127/79 - 153/79)  RR: 18 (10-19-21 @ 00:42) (16 - 18)  SpO2: 94% (10-19-21 @ 00:42) (94% - 98%)    GENERAL: NAD, lying in bed comfortably  HEAD:  Atraumatic, Normocephalic  EYES: PERRLA, exophthalmos, conjunctiva and sclera clear  ENT: Moist mucous membranes  NECK: Supple  CHEST/LUNG: Clear to auscultation bilaterally; No rales, rhonchi, wheezing, or rubs. Unlabored respirations  HEART: Irregular rate and rhythm; No murmurs.  ABDOMEN: BSx4; Soft, nontender, distended.  Negative benoit's sign.  EXTREMITIES:  2+ Peripheral Pulses. No edema.  Significant varicosities.  R knee with palpable swelling above knee.  TTP around knee cap.  No signs of cellulitis changes.  Small bandage over lateral arthrocentesis site.  Unable to flex or extend R knee.  Unable to flex R hip.    NERVOUS SYSTEM:  A&Ox3, no focal deficits   SKIN: No rashes or lesions  PSYCHIATRICALLY: tangential, paranoid

## 2021-10-20 LAB
ALBUMIN SERPL ELPH-MCNC: 3.2 G/DL — LOW (ref 3.3–5)
ALP SERPL-CCNC: 62 U/L — SIGNIFICANT CHANGE UP (ref 40–120)
ALT FLD-CCNC: 16 U/L — SIGNIFICANT CHANGE UP (ref 10–45)
ANION GAP SERPL CALC-SCNC: 13 MMOL/L — SIGNIFICANT CHANGE UP (ref 5–17)
APTT BLD: 61.4 SEC — HIGH (ref 27.5–35.5)
AST SERPL-CCNC: 31 U/L — SIGNIFICANT CHANGE UP (ref 10–40)
BASOPHILS # BLD AUTO: 0.04 K/UL — SIGNIFICANT CHANGE UP (ref 0–0.2)
BASOPHILS NFR BLD AUTO: 0.5 % — SIGNIFICANT CHANGE UP (ref 0–2)
BILIRUB SERPL-MCNC: 1.3 MG/DL — HIGH (ref 0.2–1.2)
BUN SERPL-MCNC: 26 MG/DL — HIGH (ref 7–23)
C TRACH RRNA SPEC QL NAA+PROBE: SIGNIFICANT CHANGE UP
CALCIUM SERPL-MCNC: 9 MG/DL — SIGNIFICANT CHANGE UP (ref 8.4–10.5)
CHLORIDE SERPL-SCNC: 100 MMOL/L — SIGNIFICANT CHANGE UP (ref 96–108)
CO2 SERPL-SCNC: 23 MMOL/L — SIGNIFICANT CHANGE UP (ref 22–31)
CREAT SERPL-MCNC: 1.62 MG/DL — HIGH (ref 0.5–1.3)
EOSINOPHIL # BLD AUTO: 0.1 K/UL — SIGNIFICANT CHANGE UP (ref 0–0.5)
EOSINOPHIL NFR BLD AUTO: 1.2 % — SIGNIFICANT CHANGE UP (ref 0–6)
GLUCOSE SERPL-MCNC: 144 MG/DL — HIGH (ref 70–99)
HCT VFR BLD CALC: 27.5 % — LOW (ref 39–50)
HGB BLD-MCNC: 9 G/DL — LOW (ref 13–17)
IMM GRANULOCYTES NFR BLD AUTO: 0.8 % — SIGNIFICANT CHANGE UP (ref 0–1.5)
INR BLD: 1.5 — HIGH (ref 0.88–1.16)
LYMPHOCYTES # BLD AUTO: 0.38 K/UL — LOW (ref 1–3.3)
LYMPHOCYTES # BLD AUTO: 4.5 % — LOW (ref 13–44)
MCHC RBC-ENTMCNC: 28.3 PG — SIGNIFICANT CHANGE UP (ref 27–34)
MCHC RBC-ENTMCNC: 32.7 GM/DL — SIGNIFICANT CHANGE UP (ref 32–36)
MCV RBC AUTO: 86.5 FL — SIGNIFICANT CHANGE UP (ref 80–100)
MONOCYTES # BLD AUTO: 0.71 K/UL — SIGNIFICANT CHANGE UP (ref 0–0.9)
MONOCYTES NFR BLD AUTO: 8.4 % — SIGNIFICANT CHANGE UP (ref 2–14)
N GONORRHOEA RRNA SPEC QL NAA+PROBE: SIGNIFICANT CHANGE UP
NEUTROPHILS # BLD AUTO: 7.2 K/UL — SIGNIFICANT CHANGE UP (ref 1.8–7.4)
NEUTROPHILS NFR BLD AUTO: 84.6 % — HIGH (ref 43–77)
NRBC # BLD: 0 /100 WBCS — SIGNIFICANT CHANGE UP (ref 0–0)
PHOSPHATE SERPL-MCNC: 3.3 MG/DL — SIGNIFICANT CHANGE UP (ref 2.5–4.5)
PLATELET # BLD AUTO: 146 K/UL — LOW (ref 150–400)
POTASSIUM SERPL-MCNC: 3.7 MMOL/L — SIGNIFICANT CHANGE UP (ref 3.5–5.3)
POTASSIUM SERPL-SCNC: 3.7 MMOL/L — SIGNIFICANT CHANGE UP (ref 3.5–5.3)
PROT SERPL-MCNC: 6.8 G/DL — SIGNIFICANT CHANGE UP (ref 6–8.3)
PROTHROM AB SERPL-ACNC: 17.7 SEC — HIGH (ref 10.6–13.6)
RBC # BLD: 3.18 M/UL — LOW (ref 4.2–5.8)
RBC # FLD: 15.4 % — HIGH (ref 10.3–14.5)
SODIUM SERPL-SCNC: 136 MMOL/L — SIGNIFICANT CHANGE UP (ref 135–145)
VIT B12 SERPL-MCNC: 297 PG/ML — SIGNIFICANT CHANGE UP (ref 232–1245)
WBC # BLD: 8.5 K/UL — SIGNIFICANT CHANGE UP (ref 3.8–10.5)
WBC # FLD AUTO: 8.5 K/UL — SIGNIFICANT CHANGE UP (ref 3.8–10.5)

## 2021-10-20 PROCEDURE — 99222 1ST HOSP IP/OBS MODERATE 55: CPT

## 2021-10-20 PROCEDURE — 99232 SBSQ HOSP IP/OBS MODERATE 35: CPT

## 2021-10-20 PROCEDURE — 71045 X-RAY EXAM CHEST 1 VIEW: CPT | Mod: 26

## 2021-10-20 RX ORDER — CEFTRIAXONE 500 MG/1
2000 INJECTION, POWDER, FOR SOLUTION INTRAMUSCULAR; INTRAVENOUS ONCE
Refills: 0 | Status: COMPLETED | OUTPATIENT
Start: 2021-10-20 | End: 2021-10-20

## 2021-10-20 RX ORDER — CEFTRIAXONE 500 MG/1
INJECTION, POWDER, FOR SOLUTION INTRAMUSCULAR; INTRAVENOUS
Refills: 0 | Status: DISCONTINUED | OUTPATIENT
Start: 2021-10-20 | End: 2021-10-22

## 2021-10-20 RX ORDER — CEFTRIAXONE 500 MG/1
2000 INJECTION, POWDER, FOR SOLUTION INTRAMUSCULAR; INTRAVENOUS EVERY 24 HOURS
Refills: 0 | Status: DISCONTINUED | OUTPATIENT
Start: 2021-10-21 | End: 2021-10-22

## 2021-10-20 RX ORDER — PREGABALIN 225 MG/1
1000 CAPSULE ORAL EVERY 24 HOURS
Refills: 0 | Status: DISCONTINUED | OUTPATIENT
Start: 2021-10-20 | End: 2021-10-25

## 2021-10-20 RX ORDER — WARFARIN SODIUM 2.5 MG/1
5 TABLET ORAL ONCE
Refills: 0 | Status: DISCONTINUED | OUTPATIENT
Start: 2021-10-20 | End: 2021-10-20

## 2021-10-20 RX ORDER — WARFARIN SODIUM 2.5 MG/1
5 TABLET ORAL ONCE
Refills: 0 | Status: COMPLETED | OUTPATIENT
Start: 2021-10-20 | End: 2021-10-20

## 2021-10-20 RX ADMIN — Medication 1 PACKET(S): at 19:11

## 2021-10-20 RX ADMIN — PIPERACILLIN AND TAZOBACTAM 200 GRAM(S): 4; .5 INJECTION, POWDER, LYOPHILIZED, FOR SOLUTION INTRAVENOUS at 06:30

## 2021-10-20 RX ADMIN — Medication 145 MILLIGRAM(S): at 11:22

## 2021-10-20 RX ADMIN — PIPERACILLIN AND TAZOBACTAM 200 GRAM(S): 4; .5 INJECTION, POWDER, LYOPHILIZED, FOR SOLUTION INTRAVENOUS at 11:21

## 2021-10-20 RX ADMIN — Medication 50 MILLIGRAM(S): at 11:21

## 2021-10-20 RX ADMIN — CEFTRIAXONE 100 MILLIGRAM(S): 500 INJECTION, POWDER, FOR SOLUTION INTRAMUSCULAR; INTRAVENOUS at 19:12

## 2021-10-20 RX ADMIN — BUPROPION HYDROCHLORIDE 300 MILLIGRAM(S): 150 TABLET, EXTENDED RELEASE ORAL at 11:21

## 2021-10-20 RX ADMIN — Medication 650 MILLIGRAM(S): at 03:58

## 2021-10-20 RX ADMIN — HEPARIN SODIUM 14 UNIT(S)/HR: 5000 INJECTION INTRAVENOUS; SUBCUTANEOUS at 17:48

## 2021-10-20 RX ADMIN — Medication 250 MILLIGRAM(S): at 09:06

## 2021-10-20 RX ADMIN — HEPARIN SODIUM 14 UNIT(S)/HR: 5000 INJECTION INTRAVENOUS; SUBCUTANEOUS at 02:22

## 2021-10-20 RX ADMIN — TAMSULOSIN HYDROCHLORIDE 0.4 MILLIGRAM(S): 0.4 CAPSULE ORAL at 22:06

## 2021-10-20 RX ADMIN — POLYETHYLENE GLYCOL 3350 17 GRAM(S): 17 POWDER, FOR SOLUTION ORAL at 08:33

## 2021-10-20 RX ADMIN — PREGABALIN 1000 MICROGRAM(S): 225 CAPSULE ORAL at 22:05

## 2021-10-20 RX ADMIN — SIMVASTATIN 10 MILLIGRAM(S): 20 TABLET, FILM COATED ORAL at 22:05

## 2021-10-20 RX ADMIN — PIPERACILLIN AND TAZOBACTAM 200 GRAM(S): 4; .5 INJECTION, POWDER, LYOPHILIZED, FOR SOLUTION INTRAVENOUS at 01:16

## 2021-10-20 RX ADMIN — POLYETHYLENE GLYCOL 3350 17 GRAM(S): 17 POWDER, FOR SOLUTION ORAL at 19:11

## 2021-10-20 RX ADMIN — WARFARIN SODIUM 5 MILLIGRAM(S): 2.5 TABLET ORAL at 22:05

## 2021-10-20 RX ADMIN — Medication 125 MICROGRAM(S): at 06:43

## 2021-10-20 RX ADMIN — SENNA PLUS 2 TABLET(S): 8.6 TABLET ORAL at 22:05

## 2021-10-20 RX ADMIN — Medication 1 PACKET(S): at 09:06

## 2021-10-20 NOTE — PHYSICAL THERAPY INITIAL EVALUATION ADULT - PERTINENT HX OF CURRENT PROBLEM, REHAB EVAL
81yoM poor historian with PMHx gout, depression, MVR (1994, on warfarin), L nephrectomy 2/2 kidney CA (unclear type), substance abuse, ?HTN, ?Afib, ?HLD, ?BPH presents with R knee pain that began 4 days ago.  He was "exercising" while at [employer], standing up and down on his toes.  He denies any trauma to his knee or recent falls but has fallen 3X in the past month.  He has not been able to walk for 2 days bc of the pain in his knee.

## 2021-10-20 NOTE — PROGRESS NOTE ADULT - PROBLEM SELECTOR PLAN 5
Unclear hx of afib, although reports he's on coumadin and digoxin (chart review 125mcg qd).  Heart irregular upon auscultation.  EKG with afib.  - c/w home dose digoxin  - monitor dig levels   - start hep gtt for full AC  - official med rec pending Unclear hx of afib, although reports he's on coumadin and digoxin (chart review 125mcg qd).  Heart irregular upon auscultation.  EKG with afib.  - c/w home dose digoxin  - c/w home dose warfarin   - monitor dig levels   - start hep gtt for full AC

## 2021-10-20 NOTE — PROGRESS NOTE ADULT - PROBLEM SELECTOR PLAN 2
Meets 3/4 SIRS with elevated WBC, fever, tachycardia.  Unclear signs of infection as UA and CXR clear.  Denies any diarrhea.  No signs of skin infection on exam.  - consult ID for abx recs  - f/u BCx--NGTD  - f/u UCx--<10,000 normal urogenital hilda

## 2021-10-20 NOTE — PROGRESS NOTE ADULT - SUBJECTIVE AND OBJECTIVE BOX
CASSANDRA BLISS, 81y, Male  MRN-[id number]  Patient is a 81y old  Male who presents with a chief complaint of knee pain (20 Oct 2021 10:58)      OVERNIGHT EVENTS: Patient c/o left sided cp with wheezing, satting to low 90s. Improved with 2LNC and duonebs. ECG ordered which did not show ischemic changes. Fever to 101.3 and given tylenol. Wet read of CXR showed no consolidation or other focal pulmonary process.     SUBJECTIVE: Patient seen and examined at bedside. No complaints, says he is breathing fine. Says right knee pain is improving. Denies fevers, chills, HA, chest pain, sob, nausea, vomiting, abdominal pain, diarrhea, constipation, dysuria.        12 Point ROS Negative unless noted otherwise above.  -------------------------------------------------------------------------------  VITAL SIGNS:  Vital Signs Last 24 Hrs  T(C): 37.2 (20 Oct 2021 09:28), Max: 38.5 (20 Oct 2021 02:49)  T(F): 98.9 (20 Oct 2021 09:28), Max: 101.3 (20 Oct 2021 02:49)  HR: 84 (20 Oct 2021 09:28) (67 - 107)  BP: 110/59 (20 Oct 2021 09:28) (106/62 - 168/79)  BP(mean): --  RR: 18 (20 Oct 2021 09:28) (17 - 18)  SpO2: 95% (20 Oct 2021 09:28) (91% - 99%)  I&O's Summary    19 Oct 2021 07:01  -  20 Oct 2021 07:00  --------------------------------------------------------  IN: 0 mL / OUT: 525 mL / NET: -525 mL        PHYSICAL EXAM:    General: NAD, well developed  HEENT: NC/AT; EOMI, PERRLA, anicteric sclera; moist mucosal membranes.  Neck: supple, trachea midline  Cardiovascular: RRR, +S1/S2; NO M/R/G  Respiratory: CTA B/L; no W/R/R  Gastrointestinal: soft, NT/ND; +BSx4  Extremities: +warmth, swelling, and stiffness in right knee; no erythema; WWP; no edema or cyanosis  Vascular: 2+ radial, DP/PT pulses B/L  Neurological: AAOx3; no focal deficits    ALLERGIES:  Allergies    No Known Allergies    Intolerances        MEDICATIONS:  MEDICATIONS  (STANDING):  allopurinol 50 milliGRAM(s) Oral daily  buPROPion XL (24-Hour) . 300 milliGRAM(s) Oral daily  digoxin     Tablet 125 MICROGram(s) Oral daily  fenofibrate Tablet 145 milliGRAM(s) Oral daily  heparin  Infusion 1400 Unit(s)/Hr (14 mL/Hr) IV Continuous <Continuous>  influenza  Vaccine (HIGH DOSE) 0.7 milliLiter(s) IntraMuscular once  piperacillin/tazobactam IVPB.. 3.375 Gram(s) IV Intermittent every 6 hours  polyethylene glycol 3350 17 Gram(s) Oral two times a day  potassium phosphate / sodium phosphate Powder (PHOS-NaK) 1 Packet(s) Oral three times a day with meals  senna 2 Tablet(s) Oral at bedtime  simvastatin 10 milliGRAM(s) Oral at bedtime  tamsulosin 0.4 milliGRAM(s) Oral at bedtime  vancomycin  IVPB 1000 milliGRAM(s) IV Intermittent every 24 hours  warfarin 5 milliGRAM(s) Oral once    MEDICATIONS  (PRN):  acetaminophen   Tablet .. 650 milliGRAM(s) Oral every 6 hours PRN Temp greater or equal to 38C (100.4F), Mild Pain (1 - 3)  LORazepam   Injectable 1 milliGRAM(s) IV Push every 2 hours PRN CIWA-Ar score increase by 2 points and a total score of 7 or less  oxyCODONE    IR 5 milliGRAM(s) Oral every 4 hours PRN Severe Pain (7 - 10)  traMADol 25 milliGRAM(s) Oral every 4 hours PRN Moderate Pain (4 - 6)      -------------------------------------------------------------------------------  LABS:                        9.0    8.50  )-----------( 146      ( 20 Oct 2021 07:19 )             27.5     10-20    136  |  100  |  26<H>  ----------------------------<  144<H>  3.7   |  23  |  1.62<H>    Ca    9.0      20 Oct 2021 07:19  Phos  3.3     10-20  Mg     1.8     10-19    TPro  6.8  /  Alb  3.2<L>  /  TBili  1.3<H>  /  DBili  x   /  AST  31  /  ALT  16  /  AlkPhos  62  10-20    LIVER FUNCTIONS - ( 20 Oct 2021 07:19 )  Alb: 3.2 g/dL / Pro: 6.8 g/dL / ALK PHOS: 62 U/L / ALT: 16 U/L / AST: 31 U/L / GGT: x           PT/INR - ( 19 Oct 2021 08:33 )   PT: 19.6 sec;   INR: 1.67          PTT - ( 20 Oct 2021 07:19 )  PTT:61.4 sec  Urinalysis Basic - ( 18 Oct 2021 15:44 )    Color: Yellow / Appearance: Clear / S.015 / pH: x  Gluc: x / Ketone: NEGATIVE  / Bili: NEGATIVE / Urobili: 0.2 E.U./dL   Blood: x / Protein: Trace mg/dL / Nitrite: NEGATIVE   Leuk Esterase: NEGATIVE / RBC: < 5 /HPF / WBC < 5 /HPF   Sq Epi: x / Non Sq Epi: 0-5 /HPF / Bacteria: Present /HPF      CAPILLARY BLOOD GLUCOSE          Culture - Urine (collected 19 Oct 2021 01:40)  Source: Clean Catch Clean Catch (Midstream)  Final Report (19 Oct 2021 23:16):    <10,000 CFU/mL Normal Urogenital Cherelle    Culture - Blood (collected 19 Oct 2021 01:35)  Source: .Blood Blood  Preliminary Report (20 Oct 2021 02:01):    No growth to date.    Culture - Blood (collected 19 Oct 2021 01:35)  Source: .Blood Blood  Preliminary Report (20 Oct 2021 02:01):    No growth to date.    Culture - Body Fluid with Gram Stain (collected 18 Oct 2021 19:37)  Source: Joint Fl Synovial Fluid  Gram Stain (18 Oct 2021 21:47):    No organisms seen    Few WBC's  Preliminary Report (19 Oct 2021 08:26):    No growth to date      COVID-19 PCR: NotDetec (18 Oct 2021 15:39)      RADIOLOGY & ADDITIONAL TESTS: Reviewed.

## 2021-10-20 NOTE — PROGRESS NOTE ADULT - PROBLEM SELECTOR PLAN 4
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 INR upon admission 1.8.  Unclear type of valve and duration of therapy, thus unable to determine appropriate goal (2-3 vs. 2.5-3.5).  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 INR upon admission 1.8.  Unclear type of valve and duration of therapy, thus unable to determine appropriate goal (2-3 vs. 2.5-3.5).  - obtain collateral regarding valve hx (from his facility)   - c/w home coumadin 5mg nightly   - Ordered TTE, f/u

## 2021-10-20 NOTE — PHYSICAL THERAPY INITIAL EVALUATION ADULT - ADDITIONAL COMMENTS
Pt  lives in alone in an elevator access apartment with no steps to enter. has [employer] services. Pt reports to use a SC to ambulate.

## 2021-10-20 NOTE — CONSULT NOTE ADULT - ASSESSMENT
per Internal Medicine    80 yo M poor historian with PMHx gout, depression, MVR (1994, on warfarin), L nephrectomy 2/2 kidney CA (unclear type), substance abuse, ?HTN, ?Afib, ?HLD, ?BPH presents with R knee pain suspected 2/2 soft tissue injury vs. gout flare.    Problem/Plan - 1:  ·  Problem: Right knee pain.   ·  Plan: Presents with acute R knee pain after exercising.  Hx of gout and also states he has a girlfriend (although denies recent sexual activity).  Denies any trauma to knee although pain began after exercising.  Skin w/o signs of cellulitis changes.  Decreased ROM and significant TTP with confined area of swelling above knee on exam.  - XRay knee negative  - Arthrocentesis with many WBC, f/u gram stain and cx  - consider R knee CT with contrast if Cr improves  - restart allopurinol at 50mg qd i/s/o renal impairment  - avoid NSAIDS 2/2 elevated Cr, however if suspicion for gout flare remains high, start colchicine   - f/u gram stain  - f/u uric acid level  - gc/chlamydia screen  - pain control  - ortho consult (r/o infection).    Problem/Plan - 2:  ·  Problem: Systemic inflammatory response syndrome (SIRS).   ·  Plan: Meets 3/4 SIRS with elevated WBC, fever, tachycardia.  Unclear signs of infection as UA and CXR clear.  Denies any diarrhea.  No signs of skin infection on exam.  - f/u BCx  - f/u UCx.    Problem/Plan - 3:  ·  Problem: Gout.   ·  Plan: Hx of gout on allopurinol 300mg qd.  Patient states he realized he has not been taking the medication for a month.  - restart allopurinol at 50mg qd i/s/o renal impairment  - f/u uric acid  - colchicine if in flare, as pt with reduced kidney function.    Problem/Plan - 4:  ·  Problem: S/P MVR (mitral valve replacement).   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 INR upon admission 1.8.  Unclear type of valve and duration of therapy, thus unable to determine appropriate goal (2-3 vs. 2.5-3.5).  - obtain collateral regarding valve hx  - hold coumadin and start hep gtt (renal function and ?ortho procedure).    Problem/Plan - 5:  ·  Problem: Afib.   ·  Plan: Unclear hx of afib, although reports he's on coumadin and digoxin (chart review 125mcg qd).  Heart irregular upon auscultation.  EKG with afib.  - AM dig level  - repeat EKG in AM  - c/w home dose digoxin  - start hep gtt for full AC  - official med rec.    Problem/Plan - 6:  ·  Problem: Major depression.   ·  Plan: Hx of depression and currently reporting feeling depressed and more confused lately.  Denies any SI/HI.  On buproprion 300mg qd per chart review.  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  Has not had a drink in 7-8 days but did drink ~2 bottles of wine at that time.  Is having relationship difficulties with his girlfriend.  Also reports remote hx of marijuana and cocaine use.  Denies sexual activity at this time.    - low risk CIWA  - HIV screen, hep panel  - consider psych consult.    Problem/Plan - 7:  ·  Problem: Encounter for social work intervention.   ·  Plan: Patient reports multiple falls in past, lives alone, does not know all of his medications and requires help with his medications.  Also states he's been feeling depressed.  - PT eval  - SW consult  - consider psych consult.    Problem/Plan - 8:  ·  Problem: Elevated serum creatinine.   ·  Plan: Hx of renal cancer (unclear type) with L nephrectomy.  Baseline Cr unclear  Cr upon admission 1.50.  UA negative.  - avoid nephrotoxic meds  - renally adjust meds  - no NSAIDS  - s/p 2.3L NS in ED  - f/u urine lytes    #Elevated bilirubin   Elevated bilirubin 1.5 upon admission.  Abd nontender.  Cotto's sign negative.  - trend CMP  - f/u AM fractionated bilirubin.    Problem/Plan - 9:  ·  Problem: Anemia.   ·  Plan: Hgb 10.0 upon admission.  Unclear baseline.  No active signs of bleeding on exam.  - f/u AM iron studies, folate, B12  - daily CBC.    Problem/Plan - 10:  ·  Problem: HTN (hypertension).   ·  Plan; Denies hx of HTN although per chart review on carvedilol 6.25mg and lasix 20mg qd.  - hold i/s/o renal insufficiency and sepsis  - monitor BP  - official med rec    #HLD  Denies hx of HLD although per chart review on fenofibrate 145mg qd and simvastatin 10mg qhs.  - c/w home meds  - AM lipid panel  - official med rec.    Problem/Plan - 11:  ·  Problem: BPH (benign prostatic hyperplasia).   ·  Plan: Denies hx of BPH although per chart review on tamsulosin 0.4mg.  - c/w home med  - official med rec.    Problem/Plan - 12:  ·  Problem: Nutrition, metabolism, and development symptoms.   ·  Plan: F: none  E: replete K<4, Mg<2  N: DASH  GI proph: none  DVT proph: hep gtt  Dispo: [facility].    
IMPRESSION:  Suspect gout flair vs septic arthritis.  On my exam, his knee does not appear infected.  Synovial fluid with elevated WBC however this can be seen in gout vs septic arthritis.  Gout can also cause fevers and pain in the knee    Recommend:  1.  Continue vancomycin 1 gram IV daily.  Check trough 30 minutes before the 4th floor  2.  Can stop Zosyn  3.  Start Ceftriaxone 2 grams IV daily.   4.  It would be helpful to obtain records from recent admission at [place]  5.  Follow up blood and synovial fluid cultures    ID team 2 will follow

## 2021-10-20 NOTE — PROGRESS NOTE ADULT - PROBLEM SELECTOR PLAN 10
Denies hx of HTN although per chart review on carvedilol 6.25mg and lasix 20mg qd.  - hold i/s/o renal insufficiency and sepsis  - monitor BP  - official med rec    #HLD  Denies hx of HLD although per chart review on fenofibrate 145mg qd and simvastatin 10mg qhs.  - c/w home meds  - AM lipid panel  - official med rec Denies hx of HTN although per chart review on carvedilol 6.25mg and lasix 20mg qd.  - hold i/s/o renal insufficiency and sepsis  - monitor BP  - official med rec    #HLD  Denies hx of HLD although per chart review and med rec with clinic both confirm fenofibrate 145mg qd and simvastatin 10mg qhs.  - c/w home meds fenofibrate 145mg qd and simvastatin 10mg qhs  - AM lipid panel

## 2021-10-20 NOTE — PROGRESS NOTE ADULT - PROBLEM SELECTOR PLAN 6
Hx of depression and currently reporting feeling depressed and more confused lately.  Denies any SI/HI.  On buproprion 300mg qd per chart review.  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  Has not had a drink in 7-8 days but did drink ~2 bottles of wine at that time.  Is having relationship difficulties with his girlfriend.  Also reports remote hx of marijuana and cocaine use.  Denies sexual activity at this time.    - low risk CIWA  - HIV screen, hep panel  - Ordered abdominal US 2/2 abdominal distension - f/u Hx of depression and currently reporting feeling depressed and more confused lately.  Denies any SI/HI.  On buproprion 300mg qd per chart review.  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  Has not had a drink in 7-8 days but did drink ~2 bottles of wine at that time.  Is having relationship difficulties with his girlfriend.  Also reports remote hx of marijuana and cocaine use.  Denies sexual activity at this time.    - low risk CIWA  - HIV screen, hep panel  - Ordered abdominal US 2/2 abdominal distension - WNL

## 2021-10-20 NOTE — CONSULT NOTE ADULT - SUBJECTIVE AND OBJECTIVE BOX
HPI:  81yoM poor historian with PMHx gout, depression, MVR (1994, on warfarin), L nephrectomy 2/2 kidney CA (unclear type), substance abuse, ?HTN, ?Afib, ?HLD, ?BPH presents with R knee pain that began 4 days ago.  He was "exercising" while at [employer], standing up and down on his toes.  He denies any trauma to his knee or recent falls but has fallen 3X in the past month.  He has not been able to walk for 2 days bc of the pain in his knee.  Hehas not been taking his allopurinol for the past month because he didn't realize until recently that he hadn't been taking it.  He also doesn't know all of his medications and states that someone from his apartment complex helps him.  He believes that he "hasn't been able to function (on his own) for a while now".  He has not taken any pain medications since his current injury began.  He does endorse previous gout flares but unable to recall when and which areas of his body were affected.  He states he's been feeling depressed lately and more confused.  He has "fits of anger", is paranoid, and talks to himself a lot.  He denies SI/HI.  His depression worsened lately because his relationship with his girlfriend (40 years younger) is strained.  He denies any recent sexual activity.  He is a former alcoholic but drank ~2 bottles wine for 3 days ~7-8 days ago.  That was the last time he had alcohol.  He denies any smoking and has a remote hx of drug use (marijuana and cocaine).  He denies any fevers, cough, chest pain, urinary symptoms, diarrhea.  He has felt constipated and his last BM was 2 days ago.      Patient reports he was admitted to [facility] with "blood poisoning".  He was treated for 8 days with an IV antibiotic and discharged with pills that he picked up from the pharmacy         PAST MEDICAL & SURGICAL HISTORY:  Depression    Hypertension    HLD (hyperlipidemia)    Afib    Cancer of kidney    Gout    S/P MVR (mitral valve repair)    H/O left nephrectomy          REVIEW OF SYSTEMS:    General:	 no weakness; no fevers, no chills  Skin/Breast: no rash  Respiratory and Thorax: no SOB, no cough  Cardiovascular:	No chest pain  Gastrointestinal:	 no nausea, vomiting , diarrhea  Genitourinary:	no dysuria, no difficulty urinating, no hematuria  Musculoskeletal:	no weakness, no joint swelling/pain  Neurological:	no focal weakness/numbness  Endocrine:	no polyuria, no polydipsia      ANTIBIOTICS:  MEDICATIONS  (STANDING):  allopurinol 50 milliGRAM(s) Oral daily  buPROPion XL (24-Hour) . 300 milliGRAM(s) Oral daily  digoxin     Tablet 125 MICROGram(s) Oral daily  fenofibrate Tablet 145 milliGRAM(s) Oral daily  heparin  Infusion 1400 Unit(s)/Hr (14 mL/Hr) IV Continuous <Continuous>  influenza  Vaccine (HIGH DOSE) 0.7 milliLiter(s) IntraMuscular once  piperacillin/tazobactam IVPB.. 3.375 Gram(s) IV Intermittent every 6 hours  polyethylene glycol 3350 17 Gram(s) Oral two times a day  potassium phosphate / sodium phosphate Powder (PHOS-NaK) 1 Packet(s) Oral three times a day with meals  senna 2 Tablet(s) Oral at bedtime  simvastatin 10 milliGRAM(s) Oral at bedtime  tamsulosin 0.4 milliGRAM(s) Oral at bedtime  vancomycin  IVPB 1000 milliGRAM(s) IV Intermittent every 24 hours  warfarin 5 milliGRAM(s) Oral once    MEDICATIONS  (PRN):  acetaminophen   Tablet .. 650 milliGRAM(s) Oral every 6 hours PRN Temp greater or equal to 38C (100.4F), Mild Pain (1 - 3)  LORazepam   Injectable 1 milliGRAM(s) IV Push every 2 hours PRN CIWA-Ar score increase by 2 points and a total score of 7 or less  oxyCODONE    IR 5 milliGRAM(s) Oral every 4 hours PRN Severe Pain (7 - 10)  traMADol 25 milliGRAM(s) Oral every 4 hours PRN Moderate Pain (4 - 6)      Allergies    No Known Allergies    Intolerances        SOCIAL HISTORY:    FAMILY HISTORY:  No pertinent family history in first degree relatives        Vital Signs Last 24 Hrs  T(C): 37.2 (20 Oct 2021 09:28), Max: 38.5 (20 Oct 2021 02:49)  T(F): 98.9 (20 Oct 2021 09:28), Max: 101.3 (20 Oct 2021 02:49)  HR: 84 (20 Oct 2021 09:28) (67 - 107)  BP: 110/59 (20 Oct 2021 09:28) (106/62 - 168/79)  BP(mean): --  RR: 18 (20 Oct 2021 09:28) (18 - 18)  SpO2: 95% (20 Oct 2021 09:28) (91% - 99%)    PHYSICAL EXAM:  Constitutional:   non-toxic, no distress  Eyes:  no icterus   Ear/Nose/Throat: no oral lesion  Neck:  supple  Respiratory: CTA reggie  Cardiovascular: S1S2 RRR, no murmurs  Gastrointestinal:soft, (+) BS, no HSM  Extremities:  right knee without erythema, tenderness, drainage   Vascular: DP Pulse:	right normal; left normal            LABS:                        9.0    8.50  )-----------( 146      ( 20 Oct 2021 07:19 )             27.5     10-20    136  |  100  |  26<H>  ----------------------------<  144<H>  3.7   |  23  |  1.62<H>    Ca    9.0      20 Oct 2021 07:19  Phos  3.3     10-20  Mg     1.8     10-19    TPro  6.8  /  Alb  3.2<L>  /  TBili  1.3<H>  /  DBili  x   /  AST  31  /  ALT  16  /  AlkPhos  62  10-20    PT/INR - ( 19 Oct 2021 08:33 )   PT: 19.6 sec;   INR: 1.67          PTT - ( 20 Oct 2021 07:19 )  PTT:61.4 sec      MICROBIOLOGY:    Culture - Blood (10.19.21 @ 01:35)    Specimen Source: .Blood Blood    Culture Results:   No growth to date.    Culture - Body Fluid with Gram Stain (10.18.21 @ 19:37)    Gram Stain:   No organisms seen  Few WBC's    Specimen Source: Joint Fl Synovial Fluid    Culture Results:   No growth to date      RADIOLOGY & ADDITIONAL STUDIES:

## 2021-10-21 DIAGNOSIS — F32.9 MAJOR DEPRESSIVE DISORDER, SINGLE EPISODE, UNSPECIFIED: ICD-10-CM

## 2021-10-21 LAB
ALBUMIN SERPL ELPH-MCNC: 3.6 G/DL — SIGNIFICANT CHANGE UP (ref 3.3–5)
ALP SERPL-CCNC: 106 U/L — SIGNIFICANT CHANGE UP (ref 40–120)
ALT FLD-CCNC: 37 U/L — SIGNIFICANT CHANGE UP (ref 10–45)
ANION GAP SERPL CALC-SCNC: 10 MMOL/L — SIGNIFICANT CHANGE UP (ref 5–17)
APTT BLD: 58.6 SEC — HIGH (ref 27.5–35.5)
AST SERPL-CCNC: 65 U/L — HIGH (ref 10–40)
BASOPHILS # BLD AUTO: 0.02 K/UL — SIGNIFICANT CHANGE UP (ref 0–0.2)
BASOPHILS NFR BLD AUTO: 0.3 % — SIGNIFICANT CHANGE UP (ref 0–2)
BILIRUB SERPL-MCNC: 1 MG/DL — SIGNIFICANT CHANGE UP (ref 0.2–1.2)
BUN SERPL-MCNC: 25 MG/DL — HIGH (ref 7–23)
CALCIUM SERPL-MCNC: 9.2 MG/DL — SIGNIFICANT CHANGE UP (ref 8.4–10.5)
CHLORIDE SERPL-SCNC: 102 MMOL/L — SIGNIFICANT CHANGE UP (ref 96–108)
CO2 SERPL-SCNC: 24 MMOL/L — SIGNIFICANT CHANGE UP (ref 22–31)
CREAT SERPL-MCNC: 1.3 MG/DL — SIGNIFICANT CHANGE UP (ref 0.5–1.3)
EOSINOPHIL # BLD AUTO: 0.28 K/UL — SIGNIFICANT CHANGE UP (ref 0–0.5)
EOSINOPHIL NFR BLD AUTO: 4 % — SIGNIFICANT CHANGE UP (ref 0–6)
GLUCOSE SERPL-MCNC: 119 MG/DL — HIGH (ref 70–99)
HCT VFR BLD CALC: 29.1 % — LOW (ref 39–50)
HGB BLD-MCNC: 9.7 G/DL — LOW (ref 13–17)
IMM GRANULOCYTES NFR BLD AUTO: 0.6 % — SIGNIFICANT CHANGE UP (ref 0–1.5)
INR BLD: 1.38 — HIGH (ref 0.88–1.16)
LYMPHOCYTES # BLD AUTO: 0.45 K/UL — LOW (ref 1–3.3)
LYMPHOCYTES # BLD AUTO: 6.5 % — LOW (ref 13–44)
MAGNESIUM SERPL-MCNC: 2.1 MG/DL — SIGNIFICANT CHANGE UP (ref 1.6–2.6)
MCHC RBC-ENTMCNC: 28.6 PG — SIGNIFICANT CHANGE UP (ref 27–34)
MCHC RBC-ENTMCNC: 33.3 GM/DL — SIGNIFICANT CHANGE UP (ref 32–36)
MCV RBC AUTO: 85.8 FL — SIGNIFICANT CHANGE UP (ref 80–100)
MONOCYTES # BLD AUTO: 0.54 K/UL — SIGNIFICANT CHANGE UP (ref 0–0.9)
MONOCYTES NFR BLD AUTO: 7.8 % — SIGNIFICANT CHANGE UP (ref 2–14)
NEUTROPHILS # BLD AUTO: 5.59 K/UL — SIGNIFICANT CHANGE UP (ref 1.8–7.4)
NEUTROPHILS NFR BLD AUTO: 80.8 % — HIGH (ref 43–77)
NRBC # BLD: 0 /100 WBCS — SIGNIFICANT CHANGE UP (ref 0–0)
PHOSPHATE SERPL-MCNC: 2.9 MG/DL — SIGNIFICANT CHANGE UP (ref 2.5–4.5)
PLATELET # BLD AUTO: 207 K/UL — SIGNIFICANT CHANGE UP (ref 150–400)
POTASSIUM SERPL-MCNC: 4.1 MMOL/L — SIGNIFICANT CHANGE UP (ref 3.5–5.3)
POTASSIUM SERPL-SCNC: 4.1 MMOL/L — SIGNIFICANT CHANGE UP (ref 3.5–5.3)
PROT SERPL-MCNC: 7.8 G/DL — SIGNIFICANT CHANGE UP (ref 6–8.3)
PROTHROM AB SERPL-ACNC: 16.3 SEC — HIGH (ref 10.6–13.6)
RBC # BLD: 3.39 M/UL — LOW (ref 4.2–5.8)
RBC # FLD: 15.4 % — HIGH (ref 10.3–14.5)
SODIUM SERPL-SCNC: 136 MMOL/L — SIGNIFICANT CHANGE UP (ref 135–145)
WBC # BLD: 6.92 K/UL — SIGNIFICANT CHANGE UP (ref 3.8–10.5)
WBC # FLD AUTO: 6.92 K/UL — SIGNIFICANT CHANGE UP (ref 3.8–10.5)

## 2021-10-21 PROCEDURE — 99232 SBSQ HOSP IP/OBS MODERATE 35: CPT

## 2021-10-21 PROCEDURE — 93306 TTE W/DOPPLER COMPLETE: CPT | Mod: 26

## 2021-10-21 PROCEDURE — 99233 SBSQ HOSP IP/OBS HIGH 50: CPT

## 2021-10-21 RX ADMIN — Medication 250 MILLIGRAM(S): at 06:22

## 2021-10-21 RX ADMIN — Medication 125 MICROGRAM(S): at 06:22

## 2021-10-21 RX ADMIN — Medication 145 MILLIGRAM(S): at 11:25

## 2021-10-21 RX ADMIN — HEPARIN SODIUM 14 UNIT(S)/HR: 5000 INJECTION INTRAVENOUS; SUBCUTANEOUS at 12:02

## 2021-10-21 RX ADMIN — CEFTRIAXONE 100 MILLIGRAM(S): 500 INJECTION, POWDER, FOR SOLUTION INTRAMUSCULAR; INTRAVENOUS at 17:22

## 2021-10-21 RX ADMIN — PREGABALIN 1000 MICROGRAM(S): 225 CAPSULE ORAL at 21:51

## 2021-10-21 RX ADMIN — TAMSULOSIN HYDROCHLORIDE 0.4 MILLIGRAM(S): 0.4 CAPSULE ORAL at 21:51

## 2021-10-21 RX ADMIN — SENNA PLUS 2 TABLET(S): 8.6 TABLET ORAL at 21:51

## 2021-10-21 RX ADMIN — SIMVASTATIN 10 MILLIGRAM(S): 20 TABLET, FILM COATED ORAL at 21:51

## 2021-10-21 RX ADMIN — Medication 50 MILLIGRAM(S): at 11:25

## 2021-10-21 RX ADMIN — BUPROPION HYDROCHLORIDE 300 MILLIGRAM(S): 150 TABLET, EXTENDED RELEASE ORAL at 11:25

## 2021-10-21 NOTE — PROGRESS NOTE ADULT - SUBJECTIVE AND OBJECTIVE BOX
INTERVAL HPI/OVERNIGHT EVENTS:    Patient was seen and examined at bedside.  Feels well    CONSTITUTIONAL:  Negative fever or chills, feels well, good appetite  EYES:  Negative  blurry vision or double vision  CARDIOVASCULAR:  Negative for chest pain or palpitations  RESPIRATORY:  Negative for cough, wheezing, or SOB   GASTROINTESTINAL:  Negative for nausea, vomiting, diarrhea, constipation, or abdominal pain  GENITOURINARY:  Negative frequency, urgency or dysuria  NEUROLOGIC:  No headache, confusion, dizziness, lightheadedness      ANTIBIOTICS/RELEVANT:    MEDICATIONS  (STANDING):  allopurinol 50 milliGRAM(s) Oral daily  buPROPion XL (24-Hour) . 300 milliGRAM(s) Oral daily  cefTRIAXone   IVPB      cefTRIAXone   IVPB 2000 milliGRAM(s) IV Intermittent every 24 hours  cyanocobalamin 1000 MICROGram(s) Oral every 24 hours  digoxin     Tablet 125 MICROGram(s) Oral daily  fenofibrate Tablet 145 milliGRAM(s) Oral daily  heparin  Infusion 1400 Unit(s)/Hr (14 mL/Hr) IV Continuous <Continuous>  influenza  Vaccine (HIGH DOSE) 0.7 milliLiter(s) IntraMuscular once  polyethylene glycol 3350 17 Gram(s) Oral two times a day  senna 2 Tablet(s) Oral at bedtime  simvastatin 10 milliGRAM(s) Oral at bedtime  tamsulosin 0.4 milliGRAM(s) Oral at bedtime    MEDICATIONS  (PRN):  acetaminophen   Tablet .. 650 milliGRAM(s) Oral every 6 hours PRN Temp greater or equal to 38C (100.4F), Mild Pain (1 - 3)  LORazepam   Injectable 1 milliGRAM(s) IV Push every 2 hours PRN CIWA-Ar score increase by 2 points and a total score of 7 or less  oxyCODONE    IR 5 milliGRAM(s) Oral every 4 hours PRN Severe Pain (7 - 10)  traMADol 25 milliGRAM(s) Oral every 4 hours PRN Moderate Pain (4 - 6)        Vital Signs Last 24 Hrs  T(C): 36.7 (21 Oct 2021 08:50), Max: 36.8 (20 Oct 2021 22:39)  T(F): 98.1 (21 Oct 2021 08:50), Max: 98.2 (20 Oct 2021 22:39)  HR: 100 (21 Oct 2021 08:50) (86 - 100)  BP: 128/78 (21 Oct 2021 08:50) (124/67 - 133/68)  BP(mean): --  RR: 18 (21 Oct 2021 08:50) (16 - 18)  SpO2: 97% (21 Oct 2021 08:50) (95% - 97%)    PHYSICAL EXAM:  Constitutional:  non-toxic, no distress  Eyes:ANTHONY, EOMI  Ear/Nose/Throat: no oral lesion, no sinus tenderness on percussion	  Neck:  supple  Respiratory: CTA reggie  Cardiovascular: S1S2 RRR, no murmurs  Gastrointestinal:soft, (+) BS, no HSM  Extremities:  right knee without warmth, tenderness or erythema   Vascular: DP Pulse:	right normal; left normal      LABS:                        9.7    6.92  )-----------( 207      ( 21 Oct 2021 12:42 )             29.1     10-21    136  |  102  |  25<H>  ----------------------------<  119<H>  4.1   |  24  |  1.30    Ca    9.2      21 Oct 2021 12:42  Phos  2.9     10-21  Mg     2.1     10-21    TPro  7.8  /  Alb  3.6  /  TBili  1.0  /  DBili  x   /  AST  65<H>  /  ALT  37  /  AlkPhos  106  10-21    PT/INR - ( 20 Oct 2021 07:19 )   PT: 17.7 sec;   INR: 1.50          PTT - ( 20 Oct 2021 07:19 )  PTT:61.4 sec      MICROBIOLOGY:    Culture - Blood (10.20.21 @ 05:51)    Specimen Source: .Blood Blood    Culture Results:   No growth at 1 day.    Culture - Blood (10.19.21 @ 01:35)    Specimen Source: .Blood Blood    Culture Results:   No growth to date.        RADIOLOGY & ADDITIONAL STUDIES:

## 2021-10-21 NOTE — PROGRESS NOTE ADULT - PROBLEM SELECTOR PLAN 10
Denies hx of HTN although per chart review on carvedilol 6.25mg and lasix 20mg qd.  - hold i/s/o renal insufficiency and sepsis  - monitor BP  - official med rec    #HLD  Denies hx of HLD although per chart review and med rec with clinic both confirm fenofibrate 145mg qd and simvastatin 10mg qhs.  - c/w home meds fenofibrate 145mg qd and simvastatin 10mg qhs  - AM lipid panel

## 2021-10-21 NOTE — PROGRESS NOTE ADULT - SUBJECTIVE AND OBJECTIVE BOX
CASSANDRA BLISS, 81y, Male  MRN-[id number]  Patient is a 81y old  Male who presents with a chief complaint of knee pain (21 Oct 2021 15:22)      OVERNIGHT EVENTS: naeo     SUBJECTIVE: Patient seen and examined at bedside. Says knee is improving. Denies fevers, chills, HA, chest pain, sob, nausea, vomiting, abdominal pain, diarrhea, constipation, dysuria.      12 Point ROS Negative unless noted otherwise above.  -------------------------------------------------------------------------------  VITAL SIGNS:  Vital Signs Last 24 Hrs  T(C): 37 (21 Oct 2021 17:08), Max: 37 (21 Oct 2021 17:08)  T(F): 98.6 (21 Oct 2021 17:08), Max: 98.6 (21 Oct 2021 17:08)  HR: 76 (21 Oct 2021 17:08) (76 - 100)  BP: 129/69 (21 Oct 2021 17:08) (128/78 - 133/68)  BP(mean): --  RR: 18 (21 Oct 2021 17:08) (16 - 18)  SpO2: 98% (21 Oct 2021 17:08) (95% - 98%)  I&O's Summary      PHYSICAL EXAM:    General: elderly man lying in bed in nad   HEENT: NC/AT; EOMI, PERRLA, anicteric sclera; moist mucosal membranes.  Neck: supple, trachea midline  Cardiovascular: RRR, +S1/S2; NO M/R/G  Respiratory: CTA B/L; no W/R/R  Gastrointestinal: soft, NT/ND; +BSx4  Extremities: right knee larger than left knee; right knee warm to touch; improved flexion of right knee   Vascular: 2+ radial, DP/PT pulses B/L  Neurological: AAOx2; no focal deficits    ALLERGIES:  Allergies    No Known Allergies    Intolerances        MEDICATIONS:  MEDICATIONS  (STANDING):  allopurinol 50 milliGRAM(s) Oral daily  buPROPion XL (24-Hour) . 300 milliGRAM(s) Oral daily  cefTRIAXone   IVPB      cefTRIAXone   IVPB 2000 milliGRAM(s) IV Intermittent every 24 hours  cyanocobalamin 1000 MICROGram(s) Oral every 24 hours  digoxin     Tablet 125 MICROGram(s) Oral daily  fenofibrate Tablet 145 milliGRAM(s) Oral daily  heparin  Infusion 1400 Unit(s)/Hr (14 mL/Hr) IV Continuous <Continuous>  influenza  Vaccine (HIGH DOSE) 0.7 milliLiter(s) IntraMuscular once  polyethylene glycol 3350 17 Gram(s) Oral two times a day  senna 2 Tablet(s) Oral at bedtime  simvastatin 10 milliGRAM(s) Oral at bedtime  tamsulosin 0.4 milliGRAM(s) Oral at bedtime    MEDICATIONS  (PRN):  acetaminophen   Tablet .. 650 milliGRAM(s) Oral every 6 hours PRN Temp greater or equal to 38C (100.4F), Mild Pain (1 - 3)  LORazepam   Injectable 1 milliGRAM(s) IV Push every 2 hours PRN CIWA-Ar score increase by 2 points and a total score of 7 or less  oxyCODONE    IR 5 milliGRAM(s) Oral every 4 hours PRN Severe Pain (7 - 10)  traMADol 25 milliGRAM(s) Oral every 4 hours PRN Moderate Pain (4 - 6)      -------------------------------------------------------------------------------  LABS:                        9.7    6.92  )-----------( 207      ( 21 Oct 2021 12:42 )             29.1     10-21    136  |  102  |  25<H>  ----------------------------<  119<H>  4.1   |  24  |  1.30    Ca    9.2      21 Oct 2021 12:42  Phos  2.9     10-21  Mg     2.1     10-21    TPro  7.8  /  Alb  3.6  /  TBili  1.0  /  DBili  x   /  AST  65<H>  /  ALT  37  /  AlkPhos  106  10-21    LIVER FUNCTIONS - ( 21 Oct 2021 12:42 )  Alb: 3.6 g/dL / Pro: 7.8 g/dL / ALK PHOS: 106 U/L / ALT: 37 U/L / AST: 65 U/L / GGT: x           PT/INR - ( 20 Oct 2021 07:19 )   PT: 17.7 sec;   INR: 1.50          PTT - ( 20 Oct 2021 07:19 )  PTT:61.4 sec    CAPILLARY BLOOD GLUCOSE          Culture - Blood (collected 20 Oct 2021 05:51)  Source: .Blood Blood  Preliminary Report (21 Oct 2021 06:00):    No growth at 1 day.    Culture - Urine (collected 19 Oct 2021 01:40)  Source: Clean Catch Clean Catch (Midstream)  Final Report (19 Oct 2021 23:16):    <10,000 CFU/mL Normal Urogenital Cherelle    Culture - Blood (collected 19 Oct 2021 01:35)  Source: .Blood Blood  Preliminary Report (20 Oct 2021 02:01):    No growth to date.    Culture - Blood (collected 19 Oct 2021 01:35)  Source: .Blood Blood  Preliminary Report (20 Oct 2021 02:01):    No growth to date.    Culture - Body Fluid with Gram Stain (collected 18 Oct 2021 19:37)  Source: Joint Fl Synovial Fluid  Gram Stain (18 Oct 2021 21:47):    No organisms seen    Few WBC's  Preliminary Report (19 Oct 2021 08:26):    No growth to date      COVID-19 PCR: NotDetec (18 Oct 2021 15:39)      RADIOLOGY & ADDITIONAL TESTS: Reviewed.

## 2021-10-21 NOTE — PROGRESS NOTE ADULT - SUBJECTIVE AND OBJECTIVE BOX
Physical Medicine and Rehabilitation Progress Note:    Patient is a 81y old  Male who presents with a chief complaint of knee pain (21 Oct 2021 14:20)      HPI:  81yoM poor historian with PMHx gout, depression, MVR (1994, on warfarin), L nephrectomy 2/2 kidney CA (unclear type), substance abuse, ?HTN, ?Afib, ?HLD, ?BPH presents with R knee pain that began 4 days ago.  He was "exercising" while at [employer], standing up and down on his toes.  He denies any trauma to his knee or recent falls but has fallen 3X in the past month.  He has not been able to walk for 2 days bc of the pain in his knee.  Hehas not been taking his allopurinol for the past month because he didn't realize until recently that he hadn't been taking it.  He also doesn't know all of his medications and states that someone from his apartment complex helps him.  He believes that he "hasn't been able to function (on his own) for a while now".  He has not taken any pain medications since his current injury began.  He does endorse previous gout flares but unable to recall when and which areas of his body were affected.  He states he's been feeling depressed lately and more confused.  He has "fits of anger", is paranoid, and talks to himself a lot.  He denies SI/HI.  His depression worsened lately because his relationship with his girlfriend (40 years younger) is strained.  He denies any recent sexual activity.  He is a former alcoholic but drank ~2 bottles wine for 3 days ~7-8 days ago.  That was the last time he had alcohol.  He denies any smoking and has a remote hx of drug use (marijuana and cocaine).  He denies any fevers, cough, chest pain, urinary symptoms, diarrhea.  He has felt constipated and his last BM was 2 days ago.      In the ED:  Initial vital signs: Tmax: 101.8 F, HR: 94, BP: 127/79, R: 18, SpO2: 98% on RA  ED course:   Labs: significant for WBC 10.92, Hgb 10.0, Plt 147, Bili 1.5, PT/INR 21/1.80, PTT 37.4, BUN/Cr 24/1.50, , arthrocentesis with 100% granulocytes and cloudy fluid  Imaging:  CXR: No acute cardiopulmonary disease process. Cardiomegaly.  RLE U/S: Negative for DVT  XR R knee:  Frontal and lateral views of the right knee demonstrate severe medial compartment narrowing. Large joint effusion. There is small vessel calcification. Appropriate osseous mineralization. No fracture. No dislocation. Limited secondary to positioning.  EKG: afib with LBBB  Medications: 975mg tylenol, 2mg morphine X2, 3.375mg zosyn, 2.3L NS  Consults: none  (19 Oct 2021 00:40)                            9.7    6.92  )-----------( 207      ( 21 Oct 2021 12:42 )             29.1       10-21    136  |  102  |  25<H>  ----------------------------<  119<H>  4.1   |  24  |  1.30    Ca    9.2      21 Oct 2021 12:42  Phos  2.9     10-21  Mg     2.1     10-21    TPro  7.8  /  Alb  3.6  /  TBili  1.0  /  DBili  x   /  AST  65<H>  /  ALT  37  /  AlkPhos  106  10-21    Vital Signs Last 24 Hrs  T(C): 36.7 (21 Oct 2021 08:50), Max: 36.8 (20 Oct 2021 22:39)  T(F): 98.1 (21 Oct 2021 08:50), Max: 98.2 (20 Oct 2021 22:39)  HR: 100 (21 Oct 2021 08:50) (86 - 100)  BP: 128/78 (21 Oct 2021 08:50) (124/67 - 133/68)  BP(mean): --  RR: 18 (21 Oct 2021 08:50) (16 - 18)  SpO2: 97% (21 Oct 2021 08:50) (95% - 97%)    MEDICATIONS  (STANDING):  allopurinol 50 milliGRAM(s) Oral daily  buPROPion XL (24-Hour) . 300 milliGRAM(s) Oral daily  cefTRIAXone   IVPB      cefTRIAXone   IVPB 2000 milliGRAM(s) IV Intermittent every 24 hours  cyanocobalamin 1000 MICROGram(s) Oral every 24 hours  digoxin     Tablet 125 MICROGram(s) Oral daily  fenofibrate Tablet 145 milliGRAM(s) Oral daily  heparin  Infusion 1400 Unit(s)/Hr (14 mL/Hr) IV Continuous <Continuous>  influenza  Vaccine (HIGH DOSE) 0.7 milliLiter(s) IntraMuscular once  polyethylene glycol 3350 17 Gram(s) Oral two times a day  senna 2 Tablet(s) Oral at bedtime  simvastatin 10 milliGRAM(s) Oral at bedtime  tamsulosin 0.4 milliGRAM(s) Oral at bedtime    MEDICATIONS  (PRN):  acetaminophen   Tablet .. 650 milliGRAM(s) Oral every 6 hours PRN Temp greater or equal to 38C (100.4F), Mild Pain (1 - 3)  LORazepam   Injectable 1 milliGRAM(s) IV Push every 2 hours PRN CIWA-Ar score increase by 2 points and a total score of 7 or less  oxyCODONE    IR 5 milliGRAM(s) Oral every 4 hours PRN Severe Pain (7 - 10)  traMADol 25 milliGRAM(s) Oral every 4 hours PRN Moderate Pain (4 - 6)    Currently Undergoing Physical/ Occupational Therapy at bedside.    Functional Status Assessment:   10/20/2021    Previous Level of Function:     · Ambulation Skills	independent; needs device  · Transfer Skills	independent; needs device  · ADL Skills	independent; needs device  · Work/Leisure Activity	independent; needs device  · Additional Comments	Pt  lives in alone in an elevator access apartment with no steps to enter. has A services. Pt reports to use a SC to ambulate.    Cognitive Status Examination:   · Orientation	oriented to person, place, time and situation  · Level of Consciousness	alert  · Follows Commands and Answers Questions	100% of the time  · Personal Safety and Judgment	intact    Range of Motion Exam:   · Range of Motion Examination	bilateral lower extremity ROM was WFL (within functional limits)    Manual Muscle Testing:   · Manual Muscle Testing Results	grossly assessed due to  at least 3/5 BL UE & LE based on antigravity mobility.    Bed Mobility: Rolling/Turning:     · Level of [place]	supervision    Bed Mobility: Scooting/Bridging:     · Level of [place]	supervision    Bed Mobility: Sit to Supine:     · Level of [place]	contact guard  · Physical Assist/Nonphysical Assist	1 person assist    Bed Mobility: Supine to Sit:     · Level of [place]	supervision  · Physical Assist/Nonphysical Assist	verbal cues    Bed Mobility Analysis:     · Bed Mobility Limitations	decreased ability to use legs for bridging/pushing  · Impairments Contributing to Impaired Bed Mobility	impaired balance; decreased flexibility; pain; impaired postural control; decreased ROM; decreased strength    Transfer: Sit to Stand:     · Level of [place]	contact guard  · Physical Assist/Nonphysical Assist	1 person assist  · Weight-Bearing Restrictions	weight-bearing as tolerated  · Assistive Device	straight cane    Transfer: Stand to Sit:     · Level of [place]	supervision  · Physical Assist/Nonphysical Assist	verbal cues  · Weight-Bearing Restrictions	weight-bearing as tolerated  · Assistive Device	straight cane    Sit/Stand Transfer Safety Analysis:     · Impairments Contributing to Impaired Transfers	impaired balance; impaired postural control; decreased strength    Gait Skills:     · Level of [place]	contact guard  · Physical Assist/Nonphysical Assist	1 person assist  · Weight-Bearing Restrictions	weight-bearing as tolerated  · Assistive Device	straight cane  · Gait Distance	75 feet    Gait Analysis:     · Gait Pattern Used	3-point gait  · Gait Deviations Noted	decreased lashae; decreased step length; decreased stride length; decreased weight-shifting ability  · Impairments Contributing to Gait Deviations	impaired balance; decreased flexibility; impaired postural control; decreased strength    Sensory Examination:   Sensory Examination:    Grossly Intact:   · Gross Sensory Examination	Grossly Intact      Clinical Impressions:   · Criteria for Skilled Therapeutic Interventions	impairments found; rehab potential; anticipated equipment needs at discharge; functional limitations in following categories; therapy frequency; predicted duration of therapy intervention; anticipated discharge recommendation  · Impairments Found (describe specific impairments)	aerobic capacity/endurance; gait, locomotion, and balance; muscle strength; posture; ROM  · Functional Limitations in Following Categories (describe specific limitations)	self-care; home management; community/leisure  · Rehab Potential	good, to achieve stated therapy goals  · Therapy Frequency	2-3x/week        PM&R Impression: as above    Current Disposition Plan Recommendations:    d/c home with home physical therapy

## 2021-10-21 NOTE — PROGRESS NOTE ADULT - PROBLEM SELECTOR PLAN 12
F: none  E: replete K<4, Mg<2  N: DASH  GI proph: none  DVT proph: hep gtt  Dispo: [facility]
F: none  E: replete K<4, Mg<2  N: DASH  GI proph: none  DVT proph: hep gtt  Dispo: [facility]
F: none  E: replete K<4, Mg<2  N: DASH  GI proph: none  DVT proph: hep gtt  Dispo: [facility]

## 2021-10-21 NOTE — PROGRESS NOTE ADULT - PROBLEM SELECTOR PLAN 5
Unclear hx of afib, although reports he's on coumadin and digoxin (chart review 125mcg qd).  Heart irregular upon auscultation.  EKG with afib.  - c/w home dose digoxin  - transition to eliquis   - monitor dig levels   - start hep gtt for full AC

## 2021-10-21 NOTE — PROGRESS NOTE ADULT - PROBLEM SELECTOR PLAN 6
Hx of depression and currently reporting feeling depressed and more confused lately.  Denies any SI/HI.  On buproprion 300mg qd per chart review.  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  Has not had a drink in 7-8 days but did drink ~2 bottles of wine at that time.  Is having relationship difficulties with his girlfriend.  Also reports remote hx of marijuana and cocaine use.  Denies sexual activity at this time.    - low risk CIWA  - HIV screen, hep panel  - Ordered abdominal US 2/2 abdominal distension - WNL

## 2021-10-21 NOTE — PROGRESS NOTE ADULT - PROBLEM SELECTOR PLAN 4
Patient does not have a mitral valve replacement per echo result. Per conversation with cards Dr. Bennett, there is no need for coumadin given valve is repair and annuloplasty.   - d/c warfarin and transition to eliquis for afib

## 2021-10-22 LAB
ALBUMIN SERPL ELPH-MCNC: 3.7 G/DL — SIGNIFICANT CHANGE UP (ref 3.3–5)
ALP SERPL-CCNC: 105 U/L — SIGNIFICANT CHANGE UP (ref 40–120)
ALT FLD-CCNC: 34 U/L — SIGNIFICANT CHANGE UP (ref 10–45)
ANION GAP SERPL CALC-SCNC: 10 MMOL/L — SIGNIFICANT CHANGE UP (ref 5–17)
APTT BLD: 50.3 SEC — HIGH (ref 27.5–35.5)
AST SERPL-CCNC: 45 U/L — HIGH (ref 10–40)
BASOPHILS # BLD AUTO: 0.03 K/UL — SIGNIFICANT CHANGE UP (ref 0–0.2)
BASOPHILS NFR BLD AUTO: 0.6 % — SIGNIFICANT CHANGE UP (ref 0–2)
BILIRUB SERPL-MCNC: 0.7 MG/DL — SIGNIFICANT CHANGE UP (ref 0.2–1.2)
BUN SERPL-MCNC: 25 MG/DL — HIGH (ref 7–23)
CALCIUM SERPL-MCNC: 9.6 MG/DL — SIGNIFICANT CHANGE UP (ref 8.4–10.5)
CHLORIDE SERPL-SCNC: 106 MMOL/L — SIGNIFICANT CHANGE UP (ref 96–108)
CO2 SERPL-SCNC: 26 MMOL/L — SIGNIFICANT CHANGE UP (ref 22–31)
CREAT SERPL-MCNC: 1.28 MG/DL — SIGNIFICANT CHANGE UP (ref 0.5–1.3)
EOSINOPHIL # BLD AUTO: 0.28 K/UL — SIGNIFICANT CHANGE UP (ref 0–0.5)
EOSINOPHIL NFR BLD AUTO: 5.7 % — SIGNIFICANT CHANGE UP (ref 0–6)
GLUCOSE SERPL-MCNC: 119 MG/DL — HIGH (ref 70–99)
HCT VFR BLD CALC: 27.9 % — LOW (ref 39–50)
HGB BLD-MCNC: 8.9 G/DL — LOW (ref 13–17)
IMM GRANULOCYTES NFR BLD AUTO: 0.6 % — SIGNIFICANT CHANGE UP (ref 0–1.5)
INR BLD: 1.35 — HIGH (ref 0.88–1.16)
LYMPHOCYTES # BLD AUTO: 0.48 K/UL — LOW (ref 1–3.3)
LYMPHOCYTES # BLD AUTO: 9.8 % — LOW (ref 13–44)
MAGNESIUM SERPL-MCNC: 2.2 MG/DL — SIGNIFICANT CHANGE UP (ref 1.6–2.6)
MCHC RBC-ENTMCNC: 27.6 PG — SIGNIFICANT CHANGE UP (ref 27–34)
MCHC RBC-ENTMCNC: 31.9 GM/DL — LOW (ref 32–36)
MCV RBC AUTO: 86.6 FL — SIGNIFICANT CHANGE UP (ref 80–100)
MONOCYTES # BLD AUTO: 0.41 K/UL — SIGNIFICANT CHANGE UP (ref 0–0.9)
MONOCYTES NFR BLD AUTO: 8.3 % — SIGNIFICANT CHANGE UP (ref 2–14)
NEUTROPHILS # BLD AUTO: 3.69 K/UL — SIGNIFICANT CHANGE UP (ref 1.8–7.4)
NEUTROPHILS NFR BLD AUTO: 75 % — SIGNIFICANT CHANGE UP (ref 43–77)
NRBC # BLD: 0 /100 WBCS — SIGNIFICANT CHANGE UP (ref 0–0)
PHOSPHATE SERPL-MCNC: 3 MG/DL — SIGNIFICANT CHANGE UP (ref 2.5–4.5)
PLATELET # BLD AUTO: 203 K/UL — SIGNIFICANT CHANGE UP (ref 150–400)
POTASSIUM SERPL-MCNC: 4.2 MMOL/L — SIGNIFICANT CHANGE UP (ref 3.5–5.3)
POTASSIUM SERPL-SCNC: 4.2 MMOL/L — SIGNIFICANT CHANGE UP (ref 3.5–5.3)
PROT SERPL-MCNC: 7.4 G/DL — SIGNIFICANT CHANGE UP (ref 6–8.3)
PROTHROM AB SERPL-ACNC: 16 SEC — HIGH (ref 10.6–13.6)
RBC # BLD: 3.22 M/UL — LOW (ref 4.2–5.8)
RBC # FLD: 15.5 % — HIGH (ref 10.3–14.5)
SODIUM SERPL-SCNC: 142 MMOL/L — SIGNIFICANT CHANGE UP (ref 135–145)
VANCOMYCIN TROUGH SERPL-MCNC: 7.3 UG/ML — LOW (ref 10–20)
WBC # BLD: 4.92 K/UL — SIGNIFICANT CHANGE UP (ref 3.8–10.5)
WBC # FLD AUTO: 4.92 K/UL — SIGNIFICANT CHANGE UP (ref 3.8–10.5)

## 2021-10-22 PROCEDURE — 99232 SBSQ HOSP IP/OBS MODERATE 35: CPT

## 2021-10-22 PROCEDURE — 99238 HOSP IP/OBS DSCHRG MGMT 30/<: CPT

## 2021-10-22 RX ORDER — APIXABAN 2.5 MG/1
5 TABLET, FILM COATED ORAL EVERY 12 HOURS
Refills: 0 | Status: DISCONTINUED | OUTPATIENT
Start: 2021-10-22 | End: 2021-10-25

## 2021-10-22 RX ORDER — VANCOMYCIN HCL 1 G
1250 VIAL (EA) INTRAVENOUS ONCE
Refills: 0 | Status: COMPLETED | OUTPATIENT
Start: 2021-10-22 | End: 2021-10-22

## 2021-10-22 RX ORDER — APIXABAN 2.5 MG/1
1 TABLET, FILM COATED ORAL
Qty: 60 | Refills: 0
Start: 2021-10-22 | End: 2021-11-20

## 2021-10-22 RX ORDER — WARFARIN SODIUM 2.5 MG/1
0 TABLET ORAL
Qty: 0 | Refills: 0 | DISCHARGE

## 2021-10-22 RX ORDER — VANCOMYCIN HCL 1 G
VIAL (EA) INTRAVENOUS
Refills: 0 | Status: DISCONTINUED | OUTPATIENT
Start: 2021-10-22 | End: 2021-10-22

## 2021-10-22 RX ORDER — ALLOPURINOL 300 MG
0.5 TABLET ORAL
Qty: 15 | Refills: 0
Start: 2021-10-22 | End: 2021-11-20

## 2021-10-22 RX ADMIN — POLYETHYLENE GLYCOL 3350 17 GRAM(S): 17 POWDER, FOR SOLUTION ORAL at 18:27

## 2021-10-22 RX ADMIN — APIXABAN 5 MILLIGRAM(S): 2.5 TABLET, FILM COATED ORAL at 11:43

## 2021-10-22 RX ADMIN — TAMSULOSIN HYDROCHLORIDE 0.4 MILLIGRAM(S): 0.4 CAPSULE ORAL at 22:23

## 2021-10-22 RX ADMIN — Medication 125 MICROGRAM(S): at 06:30

## 2021-10-22 RX ADMIN — APIXABAN 5 MILLIGRAM(S): 2.5 TABLET, FILM COATED ORAL at 22:23

## 2021-10-22 RX ADMIN — SENNA PLUS 2 TABLET(S): 8.6 TABLET ORAL at 22:23

## 2021-10-22 RX ADMIN — PREGABALIN 1000 MICROGRAM(S): 225 CAPSULE ORAL at 19:17

## 2021-10-22 RX ADMIN — INFLUENZA VIRUS VACCINE 0.7 MILLILITER(S): 15; 15; 15; 15 SUSPENSION INTRAMUSCULAR at 18:30

## 2021-10-22 RX ADMIN — SIMVASTATIN 10 MILLIGRAM(S): 20 TABLET, FILM COATED ORAL at 22:23

## 2021-10-22 RX ADMIN — BUPROPION HYDROCHLORIDE 300 MILLIGRAM(S): 150 TABLET, EXTENDED RELEASE ORAL at 12:10

## 2021-10-22 RX ADMIN — Medication 145 MILLIGRAM(S): at 12:09

## 2021-10-22 RX ADMIN — Medication 166.67 MILLIGRAM(S): at 12:09

## 2021-10-22 RX ADMIN — Medication 50 MILLIGRAM(S): at 12:09

## 2021-10-22 NOTE — PROGRESS NOTE ADULT - PROBLEM SELECTOR PLAN 6
Patient reports multiple falls in past, lives alone, does not know all of his medications and requires help with his medications.  Also states he's been feeling depressed.  - PT ricardo  - SW consult Patient reports multiple falls in past, lives alone, does not know all of his medications and requires help with his medications.  Also states he's been feeling depressed.  - PT rec d/c to home  - f/u ADINA

## 2021-10-22 NOTE — PROGRESS NOTE ADULT - SUBJECTIVE AND OBJECTIVE BOX
CASSANDRA BLISS, 81y, Male  MRN-[id number]  Patient is a 81y old  Male who presents with a chief complaint of knee pain (22 Oct 2021 13:04)      OVERNIGHT EVENTS: naeo     SUBJECTIVE: Patient seen and examined at bedside. Says he is feeling fine. Denies fevers, chills, HA, chest pain, sob, nausea, vomiting, abdominal pain, diarrhea, constipation, dysuria.      12 Point ROS Negative unless noted otherwise above.  -------------------------------------------------------------------------------  VITAL SIGNS:  Vital Signs Last 24 Hrs  T(C): 36.9 (22 Oct 2021 16:01), Max: 37.2 (22 Oct 2021 05:30)  T(F): 98.4 (22 Oct 2021 16:01), Max: 99 (22 Oct 2021 05:30)  HR: 85 (22 Oct 2021 16:01) (74 - 85)  BP: 137/81 (22 Oct 2021 16:01) (124/78 - 140/79)  BP(mean): --  RR: 16 (22 Oct 2021 16:01) (16 - 20)  SpO2: 95% (22 Oct 2021 16:01) (95% - 97%)  I&O's Summary    21 Oct 2021 07:01  -  22 Oct 2021 07:00  --------------------------------------------------------  IN: 168 mL / OUT: 200 mL / NET: -32 mL    22 Oct 2021 07:01  -  22 Oct 2021 17:38  --------------------------------------------------------  IN: 278 mL / OUT: 0 mL / NET: 278 mL        PHYSICAL EXAM:    General: elderly man lying in bed in nad   HEENT: NC/AT; EOMI, PERRLA, anicteric sclera; moist mucosal membranes.  Neck: supple, trachea midline  Cardiovascular: RRR, +S1/S2; NO M/R/G  Respiratory: CTA B/L; no W/R/R  Gastrointestinal: soft, NT/ND; +BSx4  Extremities: right knee larger than left knee; right knee warm to touch; improved flexion of right knee   Vascular: 2+ radial, DP/PT pulses B/L  Neurological: AAOx2; no focal deficits deficits    ALLERGIES:  Allergies    No Known Allergies    Intolerances        MEDICATIONS:  MEDICATIONS  (STANDING):  allopurinol 50 milliGRAM(s) Oral daily  apixaban 5 milliGRAM(s) Oral every 12 hours  buPROPion XL (24-Hour) . 300 milliGRAM(s) Oral daily  cyanocobalamin 1000 MICROGram(s) Oral every 24 hours  digoxin     Tablet 125 MICROGram(s) Oral daily  fenofibrate Tablet 145 milliGRAM(s) Oral daily  influenza  Vaccine (HIGH DOSE) 0.7 milliLiter(s) IntraMuscular once  polyethylene glycol 3350 17 Gram(s) Oral two times a day  senna 2 Tablet(s) Oral at bedtime  simvastatin 10 milliGRAM(s) Oral at bedtime  tamsulosin 0.4 milliGRAM(s) Oral at bedtime    MEDICATIONS  (PRN):  acetaminophen   Tablet .. 650 milliGRAM(s) Oral every 6 hours PRN Temp greater or equal to 38C (100.4F), Mild Pain (1 - 3)  LORazepam   Injectable 1 milliGRAM(s) IV Push every 2 hours PRN CIWA-Ar score increase by 2 points and a total score of 7 or less  oxyCODONE    IR 5 milliGRAM(s) Oral every 4 hours PRN Severe Pain (7 - 10)  traMADol 25 milliGRAM(s) Oral every 4 hours PRN Moderate Pain (4 - 6)      -------------------------------------------------------------------------------  LABS:                        8.9    4.92  )-----------( 203      ( 22 Oct 2021 08:55 )             27.9     10-22    142  |  106  |  25<H>  ----------------------------<  119<H>  4.2   |  26  |  1.28    Ca    9.6      22 Oct 2021 08:55  Phos  3.0     10-22  Mg     2.2     10-22    TPro  7.4  /  Alb  3.7  /  TBili  0.7  /  DBili  x   /  AST  45<H>  /  ALT  34  /  AlkPhos  105  10-22    LIVER FUNCTIONS - ( 22 Oct 2021 08:55 )  Alb: 3.7 g/dL / Pro: 7.4 g/dL / ALK PHOS: 105 U/L / ALT: 34 U/L / AST: 45 U/L / GGT: x           PT/INR - ( 22 Oct 2021 08:55 )   PT: 16.0 sec;   INR: 1.35          PTT - ( 22 Oct 2021 08:55 )  PTT:50.3 sec    CAPILLARY BLOOD GLUCOSE          Culture - Blood (collected 20 Oct 2021 05:51)  Source: .Blood Blood  Preliminary Report (22 Oct 2021 06:00):    No growth at 2 days.      COVID-19 PCR: NotDetec (18 Oct 2021 15:39)      RADIOLOGY & ADDITIONAL TESTS: Reviewed.

## 2021-10-22 NOTE — PROGRESS NOTE ADULT - PROBLEM SELECTOR PLAN 5
Hx of depression and currently reporting feeling depressed and more confused lately.  Denies any SI/HI.  On buproprion 300mg qd per chart review.  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  Has not had a drink in 7-8 days but did drink ~2 bottles of wine at that time.  Is having relationship difficulties with his girlfriend.  Also reports remote hx of marijuana and cocaine use.  Denies sexual activity at this time.    - low risk CIWA  - HIV screen, hep panel  - Ordered abdominal US 2/2 abdominal distension - WNL Hx of depression and currently reporting feeling depressed and more confused lately.  Denies any SI/HI.  On buproprion 300mg qd per chart review.  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  Has not had a drink in 7-8 days but did drink ~2 bottles of wine at that time.  Is having relationship difficulties with his girlfriend.  Also reports remote hx of marijuana and cocaine use.  Denies sexual activity at this time.    - low risk CIWA  - HIV screen, hep panel  - Ordered abdominal US 2/2 abdominal distension - WNL

## 2021-10-22 NOTE — PROGRESS NOTE ADULT - PROBLEM SELECTOR PLAN 2
Hx of gout on allopurinol 300mg qd.  Patient states he realized he has not been taking the medication for a month.  - c/w allopurinol at 50mg qd i/s/o renal impairment  - f/u uric acid--WNL  - colchicine if in flare, as pt with reduced kidney function

## 2021-10-22 NOTE — PROGRESS NOTE ADULT - SUBJECTIVE AND OBJECTIVE BOX
INTERVAL HPI/OVERNIGHT EVENTS:    Patient was seen and examined at bedside.  No events    CONSTITUTIONAL:  Negative fever or chills, feels well, good appetite  EYES:  Negative  blurry vision or double vision  CARDIOVASCULAR:  Negative for chest pain or palpitations  RESPIRATORY:  Negative for cough, wheezing, or SOB   GASTROINTESTINAL:  Negative for nausea, vomiting, diarrhea, constipation, or abdominal pain  GENITOURINARY:  Negative frequency, urgency or dysuria  NEUROLOGIC:  No headache, confusion, dizziness, lightheadedness      ANTIBIOTICS/RELEVANT:    MEDICATIONS  (STANDING):  allopurinol 50 milliGRAM(s) Oral daily  apixaban 5 milliGRAM(s) Oral every 12 hours  buPROPion XL (24-Hour) . 300 milliGRAM(s) Oral daily  cefTRIAXone   IVPB      cefTRIAXone   IVPB 2000 milliGRAM(s) IV Intermittent every 24 hours  cyanocobalamin 1000 MICROGram(s) Oral every 24 hours  digoxin     Tablet 125 MICROGram(s) Oral daily  fenofibrate Tablet 145 milliGRAM(s) Oral daily  influenza  Vaccine (HIGH DOSE) 0.7 milliLiter(s) IntraMuscular once  polyethylene glycol 3350 17 Gram(s) Oral two times a day  senna 2 Tablet(s) Oral at bedtime  simvastatin 10 milliGRAM(s) Oral at bedtime  tamsulosin 0.4 milliGRAM(s) Oral at bedtime  vancomycin  IVPB        MEDICATIONS  (PRN):  acetaminophen   Tablet .. 650 milliGRAM(s) Oral every 6 hours PRN Temp greater or equal to 38C (100.4F), Mild Pain (1 - 3)  LORazepam   Injectable 1 milliGRAM(s) IV Push every 2 hours PRN CIWA-Ar score increase by 2 points and a total score of 7 or less  oxyCODONE    IR 5 milliGRAM(s) Oral every 4 hours PRN Severe Pain (7 - 10)  traMADol 25 milliGRAM(s) Oral every 4 hours PRN Moderate Pain (4 - 6)        Vital Signs Last 24 Hrs  T(C): 36.7 (22 Oct 2021 09:21), Max: 37.2 (22 Oct 2021 05:30)  T(F): 98 (22 Oct 2021 09:21), Max: 99 (22 Oct 2021 05:30)  HR: 78 (22 Oct 2021 09:21) (74 - 84)  BP: 131/84 (22 Oct 2021 09:21) (124/78 - 140/79)  BP(mean): --  RR: 16 (22 Oct 2021 09:21) (16 - 20)  SpO2: 96% (22 Oct 2021 09:21) (95% - 98%)    PHYSICAL EXAM:  Constitutional:  non-toxic, no distress  Eyes:ANTHONY, EOMI  Ear/Nose/Throat: no oral lesion, no sinus tenderness on percussion	  Neck:  supple  Respiratory: CTA reggie  Cardiovascular: S1S2 RRR, no murmurs  Gastrointestinal:soft, (+) BS, no HSM  Extremities:  right knee without redness, warmth, tenderness, discharge   Vascular: DP Pulse:	right normal; left normal      LABS:                        8.9    4.92  )-----------( 203      ( 22 Oct 2021 08:55 )             27.9     10-22    142  |  106  |  25<H>  ----------------------------<  119<H>  4.2   |  26  |  1.28    Ca    9.6      22 Oct 2021 08:55  Phos  3.0     10-22  Mg     2.2     10-22    TPro  7.4  /  Alb  3.7  /  TBili  0.7  /  DBili  x   /  AST  45<H>  /  ALT  34  /  AlkPhos  105  10-22    PT/INR - ( 22 Oct 2021 08:55 )   PT: 16.0 sec;   INR: 1.35          PTT - ( 22 Oct 2021 08:55 )  PTT:50.3 sec      MICROBIOLOGY:    Culture - Blood (10.20.21 @ 05:51)    Specimen Source: .Blood Blood    Culture Results:   No growth at 2 days.    Culture - Blood (10.19.21 @ 01:35)    Specimen Source: .Blood Blood    Culture Results:   No growth to date.        Culture - Body Fluid with Gram Stain (10.18.21 @ 19:37)    Gram Stain:   No organisms seen  Few WBC's    Specimen Source: Joint Fl Synovial Fluid    Culture Results:   No growth to date    RADIOLOGY & ADDITIONAL STUDIES:

## 2021-10-22 NOTE — PROGRESS NOTE ADULT - PROBLEM SELECTOR PLAN 10
Denies hx of BPH although per chart review and office med rec patient is on tamsulosin 0.4mg.  - c/w home med tamsulosin 0.4mg

## 2021-10-23 LAB
ALBUMIN SERPL ELPH-MCNC: 3.4 G/DL — SIGNIFICANT CHANGE UP (ref 3.3–5)
ALP SERPL-CCNC: 129 U/L — HIGH (ref 40–120)
ALT FLD-CCNC: 41 U/L — SIGNIFICANT CHANGE UP (ref 10–45)
ANION GAP SERPL CALC-SCNC: 9 MMOL/L — SIGNIFICANT CHANGE UP (ref 5–17)
ANISOCYTOSIS BLD QL: SLIGHT — SIGNIFICANT CHANGE UP
AST SERPL-CCNC: 51 U/L — HIGH (ref 10–40)
BASOPHILS # BLD AUTO: 0.2 K/UL — SIGNIFICANT CHANGE UP (ref 0–0.2)
BASOPHILS NFR BLD AUTO: 3.5 % — HIGH (ref 0–2)
BILIRUB SERPL-MCNC: 0.6 MG/DL — SIGNIFICANT CHANGE UP (ref 0.2–1.2)
BUN SERPL-MCNC: 22 MG/DL — SIGNIFICANT CHANGE UP (ref 7–23)
CALCIUM SERPL-MCNC: 9.7 MG/DL — SIGNIFICANT CHANGE UP (ref 8.4–10.5)
CHLORIDE SERPL-SCNC: 105 MMOL/L — SIGNIFICANT CHANGE UP (ref 96–108)
CO2 SERPL-SCNC: 24 MMOL/L — SIGNIFICANT CHANGE UP (ref 22–31)
CREAT SERPL-MCNC: 1.21 MG/DL — SIGNIFICANT CHANGE UP (ref 0.5–1.3)
EOSINOPHIL # BLD AUTO: 0.15 K/UL — SIGNIFICANT CHANGE UP (ref 0–0.5)
EOSINOPHIL NFR BLD AUTO: 2.6 % — SIGNIFICANT CHANGE UP (ref 0–6)
GIANT PLATELETS BLD QL SMEAR: PRESENT — SIGNIFICANT CHANGE UP
GLUCOSE SERPL-MCNC: 113 MG/DL — HIGH (ref 70–99)
HCT VFR BLD CALC: 27.8 % — LOW (ref 39–50)
HGB BLD-MCNC: 9 G/DL — LOW (ref 13–17)
LYMPHOCYTES # BLD AUTO: 0.49 K/UL — LOW (ref 1–3.3)
LYMPHOCYTES # BLD AUTO: 8.8 % — LOW (ref 13–44)
MAGNESIUM SERPL-MCNC: 2.1 MG/DL — SIGNIFICANT CHANGE UP (ref 1.6–2.6)
MANUAL SMEAR VERIFICATION: SIGNIFICANT CHANGE UP
MCHC RBC-ENTMCNC: 28.3 PG — SIGNIFICANT CHANGE UP (ref 27–34)
MCHC RBC-ENTMCNC: 32.4 GM/DL — SIGNIFICANT CHANGE UP (ref 32–36)
MCV RBC AUTO: 87.4 FL — SIGNIFICANT CHANGE UP (ref 80–100)
MONOCYTES # BLD AUTO: 0.25 K/UL — SIGNIFICANT CHANGE UP (ref 0–0.9)
MONOCYTES NFR BLD AUTO: 4.4 % — SIGNIFICANT CHANGE UP (ref 2–14)
NEUTROPHILS # BLD AUTO: 4.5 K/UL — SIGNIFICANT CHANGE UP (ref 1.8–7.4)
NEUTROPHILS NFR BLD AUTO: 80.7 % — HIGH (ref 43–77)
OVALOCYTES BLD QL SMEAR: SLIGHT — SIGNIFICANT CHANGE UP
PHOSPHATE SERPL-MCNC: 3.5 MG/DL — SIGNIFICANT CHANGE UP (ref 2.5–4.5)
PLAT MORPH BLD: NORMAL — SIGNIFICANT CHANGE UP
PLATELET # BLD AUTO: 205 K/UL — SIGNIFICANT CHANGE UP (ref 150–400)
POTASSIUM SERPL-MCNC: 4.3 MMOL/L — SIGNIFICANT CHANGE UP (ref 3.5–5.3)
POTASSIUM SERPL-SCNC: 4.3 MMOL/L — SIGNIFICANT CHANGE UP (ref 3.5–5.3)
PROT SERPL-MCNC: 7.5 G/DL — SIGNIFICANT CHANGE UP (ref 6–8.3)
RBC # BLD: 3.18 M/UL — LOW (ref 4.2–5.8)
RBC # FLD: 15.6 % — HIGH (ref 10.3–14.5)
RBC BLD AUTO: ABNORMAL
SCHISTOCYTES BLD QL AUTO: SLIGHT — SIGNIFICANT CHANGE UP
SODIUM SERPL-SCNC: 138 MMOL/L — SIGNIFICANT CHANGE UP (ref 135–145)
WBC # BLD: 5.58 K/UL — SIGNIFICANT CHANGE UP (ref 3.8–10.5)
WBC # FLD AUTO: 5.58 K/UL — SIGNIFICANT CHANGE UP (ref 3.8–10.5)

## 2021-10-23 PROCEDURE — 99232 SBSQ HOSP IP/OBS MODERATE 35: CPT | Mod: GC

## 2021-10-23 RX ADMIN — TAMSULOSIN HYDROCHLORIDE 0.4 MILLIGRAM(S): 0.4 CAPSULE ORAL at 21:24

## 2021-10-23 RX ADMIN — SIMVASTATIN 10 MILLIGRAM(S): 20 TABLET, FILM COATED ORAL at 21:24

## 2021-10-23 RX ADMIN — Medication 145 MILLIGRAM(S): at 11:30

## 2021-10-23 RX ADMIN — BUPROPION HYDROCHLORIDE 300 MILLIGRAM(S): 150 TABLET, EXTENDED RELEASE ORAL at 11:30

## 2021-10-23 RX ADMIN — APIXABAN 5 MILLIGRAM(S): 2.5 TABLET, FILM COATED ORAL at 21:24

## 2021-10-23 RX ADMIN — APIXABAN 5 MILLIGRAM(S): 2.5 TABLET, FILM COATED ORAL at 10:07

## 2021-10-23 RX ADMIN — PREGABALIN 1000 MICROGRAM(S): 225 CAPSULE ORAL at 19:05

## 2021-10-23 RX ADMIN — Medication 50 MILLIGRAM(S): at 11:29

## 2021-10-23 RX ADMIN — POLYETHYLENE GLYCOL 3350 17 GRAM(S): 17 POWDER, FOR SOLUTION ORAL at 19:05

## 2021-10-23 RX ADMIN — Medication 125 MICROGRAM(S): at 06:32

## 2021-10-23 NOTE — PROGRESS NOTE ADULT - ATTENDING COMMENTS
Patient was seen and examined at bedside on 10/23/2021 at 9 am. He has no acute complaints at this time - "feels great". Denies SOB, CP. ROS is otherwise negative. Vitals, labwork and pertinent imaging reviewed - afebrile, HDS. Physical exam - NAD, AAO x 4, PERRLA, EOMI, MMM, supple neck, chest - CTA b/l, CV - rrr, s1s2, no m/r/g, abd - soft, NTND, + BS, ext - wwp, MSK - R joint swelling, psych - normal affect    Plan  To d/c today with close follow up for R knee pain (supposed OA vs gout)    Total time spent ~ 32 minutes
d/c antibiotics. Pt needs f/u with ortho for osteoarthritis. cont rx for gout  PT advised d/c to home  Echo showed no prosthetic valve. started eloquis for AF. f/u with cardio as outpatient   Discharge planning
ID f/u and f/u synovial fluid c/s. Knee pain mostly due to osteoarthritis and gout and unlikely septic arthritis  D/w Cardio attending regarding echo findings. Echo shows findings of mitral valve repair and not prosthetic valve. Pt was explained about this and will consider switching to [place] for AF. He will be provided with cardio appt  Discharge planning
Sepsis due to Rt knee septic arthritis?. f/u final c/s no growth but arthrocentesis performed after antibiotics given in ed and sepsis has resolved  Knee pain could be due to severe osteoarthritis/gout/septic arthritis?  f/u Blood c/s and r/o IE of prosthetic valve if positive  No signs of Pneumonia/uti    AF rate controlled. cont  iv heparin and coumadin and Monitor INR  r/o ACS and echo to r/o chf/valve thrombosis    Abdominal us unremarkable and pt denies any abdominal complaints now    Pt lives at assisted living facility. PT eval after pain optimisation and discharge planning  Pt has poor understanding of his health due to health literacy/dementia?  will get collateral info   Discharge planning
Sepsis due to Rt knee septic arthritis?. f/u final c/s and consider ID consult  Knee pain could be due to severe osteoarthritis/gout/septic arthritis?  f/u Blood c/s and r/o IE of prosthetic valve if positive  No signs of Pneumonia/uti    AF rate controlled. cont  iv heparin and coumadin and Monitor INR  r/o ACS and echo to r/o chf/valve thrombosis    Abdominal us to evaluate cirrhosis  cirrhosis due to alcohol use/cardiac cirrhosis?  Unlikely SBP/cholangitis/abdominal source of sepsis    Pt lives at assisted living facility. PT eval after pain optimisation and discharge planning  Pt has poor understanding of his health due to health literacy/dementia?  will get collateral info about if he has dementia and consider tsh, B1, Rpr for now  If this is new change in mental status will consider futher w/u.

## 2021-10-23 NOTE — PROGRESS NOTE ADULT - SUBJECTIVE AND OBJECTIVE BOX
CASSANDRA BLISS, 81y, Male  [id number]  Patient is a 81y old  Male who presents with a chief complaint of knee pain (23 Oct 2021 17:06)      OVERNIGHT EVENTS: naeo     SUBJECTIVE: Patient seen and examined at bedside. Denies fevers, chills, HA, chest pain, sob, nausea, vomiting, abdominal pain, diarrhea, constipation, dysuria.      12 Point ROS Negative unless noted otherwise above.  -------------------------------------------------------------------------------  VITAL SIGNS:  Vital Signs Last 24 Hrs  T(C): 36.6 (23 Oct 2021 20:37), Max: 36.8 (23 Oct 2021 08:23)  T(F): 97.9 (23 Oct 2021 20:37), Max: 98.3 (23 Oct 2021 08:23)  HR: 85 (23 Oct 2021 20:37) (81 - 91)  BP: 119/76 (23 Oct 2021 20:37) (119/70 - 145/76)  BP(mean): --  RR: 20 (23 Oct 2021 20:37) (17 - 20)  SpO2: 98% (23 Oct 2021 20:37) (96% - 98%)  I&O's Summary    22 Oct 2021 07:01  -  23 Oct 2021 07:00  --------------------------------------------------------  IN: 278 mL / OUT: 0 mL / NET: 278 mL        PHYSICAL EXAM:    General: elderly man lying in bed in nad   HEENT: NC/AT; EOMI, PERRLA, anicteric sclera; moist mucosal membranes.  Neck: supple, trachea midline  Cardiovascular: RRR, +S1/S2; NO M/R/G  Respiratory: CTA B/L; no W/R/R  Gastrointestinal: soft, NT/ND; +BSx4  Extremities: right knee larger than left knee; right knee warm to touch; improved flexion of right knee   Vascular: 2+ radial, DP/PT pulses B/L  Neurological: no focal deficits deficits    ALLERGIES:  Allergies    No Known Allergies    Intolerances        MEDICATIONS:  MEDICATIONS  (STANDING):  allopurinol 50 milliGRAM(s) Oral daily  apixaban 5 milliGRAM(s) Oral every 12 hours  buPROPion XL (24-Hour) . 300 milliGRAM(s) Oral daily  cyanocobalamin 1000 MICROGram(s) Oral every 24 hours  digoxin     Tablet 125 MICROGram(s) Oral daily  fenofibrate Tablet 145 milliGRAM(s) Oral daily  polyethylene glycol 3350 17 Gram(s) Oral two times a day  senna 2 Tablet(s) Oral at bedtime  simvastatin 10 milliGRAM(s) Oral at bedtime  tamsulosin 0.4 milliGRAM(s) Oral at bedtime    MEDICATIONS  (PRN):  acetaminophen   Tablet .. 650 milliGRAM(s) Oral every 6 hours PRN Temp greater or equal to 38C (100.4F), Mild Pain (1 - 3)  LORazepam   Injectable 1 milliGRAM(s) IV Push every 2 hours PRN CIWA-Ar score increase by 2 points and a total score of 7 or less  oxyCODONE    IR 5 milliGRAM(s) Oral every 4 hours PRN Severe Pain (7 - 10)  traMADol 25 milliGRAM(s) Oral every 4 hours PRN Moderate Pain (4 - 6)      -------------------------------------------------------------------------------  LABS:                        9.0    5.58  )-----------( 205      ( 23 Oct 2021 06:52 )             27.8     10-23    138  |  105  |  22  ----------------------------<  113<H>  4.3   |  24  |  1.21    Ca    9.7      23 Oct 2021 06:52  Phos  3.5     10-23  Mg     2.1     10-23    TPro  7.5  /  Alb  3.4  /  TBili  0.6  /  DBili  x   /  AST  51<H>  /  ALT  41  /  AlkPhos  129<H>  10-23    LIVER FUNCTIONS - ( 23 Oct 2021 06:52 )  Alb: 3.4 g/dL / Pro: 7.5 g/dL / ALK PHOS: 129 U/L / ALT: 41 U/L / AST: 51 U/L / GGT: x           PT/INR - ( 22 Oct 2021 08:55 )   PT: 16.0 sec;   INR: 1.35          PTT - ( 22 Oct 2021 08:55 )  PTT:50.3 sec    CAPILLARY BLOOD GLUCOSE          COVID-19 PCR: NotDetec (18 Oct 2021 15:39)      RADIOLOGY & ADDITIONAL TESTS: Reviewed.

## 2021-10-23 NOTE — PROGRESS NOTE ADULT - PROBLEM SELECTOR PLAN 5
Hx of depression and currently reporting feeling depressed and more confused lately.  Denies any SI/HI.  On buproprion 300mg qd per chart review.  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  Has not had a drink in 7-8 days but did drink ~2 bottles of wine at that time.  Is having relationship difficulties with his girlfriend.  Also reports remote hx of marijuana and cocaine use.  Denies sexual activity at this time.    - low risk CIWA  - HIV screen, hep panel  - Ordered abdominal US 2/2 abdominal distension - WNL

## 2021-10-23 NOTE — PROGRESS NOTE ADULT - SUBJECTIVE AND OBJECTIVE BOX
Physical Medicine and Rehabilitation Progress Note:    Patient is a 81y old  Male who presents with a chief complaint of knee pain (23 Oct 2021 11:49)      HPI:  81yoM poor historian with PMHx gout, depression, MVR (1994, on warfarin), L nephrectomy 2/2 kidney CA (unclear type), substance abuse, ?HTN, ?Afib, ?HLD, ?BPH presents with R knee pain that began 4 days ago.  He was "exercising" while at [employer], standing up and down on his toes.  He denies any trauma to his knee or recent falls but has fallen 3X in the past month.  He has not been able to walk for 2 days bc of the pain in his knee.  Hehas not been taking his allopurinol for the past month because he didn't realize until recently that he hadn't been taking it.  He also doesn't know all of his medications and states that someone from his apartment complex helps him.  He believes that he "hasn't been able to function (on his own) for a while now".  He has not taken any pain medications since his current injury began.  He does endorse previous gout flares but unable to recall when and which areas of his body were affected.  He states he's been feeling depressed lately and more confused.  He has "fits of anger", is paranoid, and talks to himself a lot.  He denies SI/HI.  His depression worsened lately because his relationship with his girlfriend (40 years younger) is strained.  He denies any recent sexual activity.  He is a former alcoholic but drank ~2 bottles wine for 3 days ~7-8 days ago.  That was the last time he had alcohol.  He denies any smoking and has a remote hx of drug use (marijuana and cocaine).  He denies any fevers, cough, chest pain, urinary symptoms, diarrhea.  He has felt constipated and his last BM was 2 days ago.      In the ED:  Initial vital signs: Tmax: 101.8 F, HR: 94, BP: 127/79, R: 18, SpO2: 98% on RA  ED course:   Labs: significant for WBC 10.92, Hgb 10.0, Plt 147, Bili 1.5, PT/INR 21/1.80, PTT 37.4, BUN/Cr 24/1.50, , arthrocentesis with 100% granulocytes and cloudy fluid  Imaging:  CXR: No acute cardiopulmonary disease process. Cardiomegaly.  RLE U/S: Negative for DVT  XR R knee:  Frontal and lateral views of the right knee demonstrate severe medial compartment narrowing. Large joint effusion. There is small vessel calcification. Appropriate osseous mineralization. No fracture. No dislocation. Limited secondary to positioning.  EKG: afib with LBBB  Medications: 975mg tylenol, 2mg morphine X2, 3.375mg zosyn, 2.3L NS  Consults: none  (19 Oct 2021 00:40)                            9.0    5.58  )-----------( 205      ( 23 Oct 2021 06:52 )             27.8       10-23    138  |  105  |  22  ----------------------------<  113<H>  4.3   |  24  |  1.21    Ca    9.7      23 Oct 2021 06:52  Phos  3.5     10-23  Mg     2.1     10-23    TPro  7.5  /  Alb  3.4  /  TBili  0.6  /  DBili  x   /  AST  51<H>  /  ALT  41  /  AlkPhos  129<H>  10-23    Vital Signs Last 24 Hrs  T(C): 36.8 (23 Oct 2021 16:42), Max: 36.8 (23 Oct 2021 08:23)  T(F): 98.3 (23 Oct 2021 16:42), Max: 98.3 (23 Oct 2021 08:23)  HR: 87 (23 Oct 2021 16:42) (81 - 91)  BP: 119/70 (23 Oct 2021 16:42) (119/70 - 146/84)  BP(mean): --  RR: 17 (23 Oct 2021 16:42) (17 - 18)  SpO2: 97% (23 Oct 2021 16:42) (96% - 97%)    MEDICATIONS  (STANDING):  allopurinol 50 milliGRAM(s) Oral daily  apixaban 5 milliGRAM(s) Oral every 12 hours  buPROPion XL (24-Hour) . 300 milliGRAM(s) Oral daily  cyanocobalamin 1000 MICROGram(s) Oral every 24 hours  digoxin     Tablet 125 MICROGram(s) Oral daily  fenofibrate Tablet 145 milliGRAM(s) Oral daily  polyethylene glycol 3350 17 Gram(s) Oral two times a day  senna 2 Tablet(s) Oral at bedtime  simvastatin 10 milliGRAM(s) Oral at bedtime  tamsulosin 0.4 milliGRAM(s) Oral at bedtime    MEDICATIONS  (PRN):  acetaminophen   Tablet .. 650 milliGRAM(s) Oral every 6 hours PRN Temp greater or equal to 38C (100.4F), Mild Pain (1 - 3)  LORazepam   Injectable 1 milliGRAM(s) IV Push every 2 hours PRN CIWA-Ar score increase by 2 points and a total score of 7 or less  oxyCODONE    IR 5 milliGRAM(s) Oral every 4 hours PRN Severe Pain (7 - 10)  traMADol 25 milliGRAM(s) Oral every 4 hours PRN Moderate Pain (4 - 6)    Currently Undergoing Physical/ Occupational Therapy at bedside.    Functional Status Assessment:   10/22/2021        Cognitive/Perceptual/Neuro  Cognitive/Neuro/Behavioral [WDL Definition: Alert; opens eyes spontaneously; arouses to voice or touch; oriented x 4; follows commands; speech spontaneous, logical; purposeful motor response; behavior appropriate to situation]: WDL except  Level of Consciousness: alert  Orientation: disoriented to;  situation  Speech: clear;  spontaneous;  well paced  Mood/Behavior: cooperative;  calm    Language Assistance  Preferred Language to Address Healthcare Preferred Language to Address Healthcare: English    Therapeutic Interventions      Sit-Stand Transfer Training  Transfer Training Sit-to-Stand Transfer: contact guard;  1 person assist;  verbal cues;  nonverbal cues (demo/gestures);  weight-bearing as tolerated   straight cane  Transfer Training Stand-to-Sit Transfer: contact guard;  verbal cues;  nonverbal cues (demo/gestures);  1 person assist;  weight-bearing as tolerated   straight cane  Sit-to-Stand Transfer Training Transfer Safety Analysis: decreased balance;  decreased weight-shifting ability;  decreased strength;  impaired balance;  impaired postural control;  straight cane    Gait Training  Gait Training: contact guard;  verbal cues;  nonverbal cues (demo/gestures);  1 person assist;  weight-bearing as tolerated   straight cane;  100 feet  Gait Analysis: 3-point gait   decreased lashae;  crouch;  decreased hip/knee flexion;  decreased step length;  decreased stride length;  decreased toe clearance;  decreased flexibility;  decreased strength;  impaired balance;  impaired postural control;  100 feet;  straight cane    Therapeutic Exercise  Therapeutic Exercise Detail: Supine Therex: Ankle pump, Heel slides, Quad sets, Glute sets .           PM&R Impression: as above    Current Disposition Plan Recommendations:    subacute rehab placement

## 2021-10-23 NOTE — PROGRESS NOTE ADULT - PROBLEM SELECTOR PLAN 6
Patient reports multiple falls in past, lives alone, does not know all of his medications and requires help with his medications.  Also states he's been feeling depressed.  - PT rec d/c to home  - f/u [facility]

## 2021-10-23 NOTE — PROGRESS NOTE ADULT - PROBLEM SELECTOR PLAN 4
Unclear hx of afib, although reports he's on coumadin and digoxin (chart review 125mcg qd).  Heart irregular upon auscultation.  EKG with afib.  - c/w home dose digoxin  - c/w eliquis

## 2021-10-24 LAB
CULTURE RESULTS: SIGNIFICANT CHANGE UP
CULTURE RESULTS: SIGNIFICANT CHANGE UP
SPECIMEN SOURCE: SIGNIFICANT CHANGE UP
SPECIMEN SOURCE: SIGNIFICANT CHANGE UP

## 2021-10-24 PROCEDURE — 99232 SBSQ HOSP IP/OBS MODERATE 35: CPT | Mod: GC

## 2021-10-24 RX ADMIN — APIXABAN 5 MILLIGRAM(S): 2.5 TABLET, FILM COATED ORAL at 09:21

## 2021-10-24 RX ADMIN — Medication 125 MICROGRAM(S): at 06:15

## 2021-10-24 RX ADMIN — BUPROPION HYDROCHLORIDE 300 MILLIGRAM(S): 150 TABLET, EXTENDED RELEASE ORAL at 11:30

## 2021-10-24 RX ADMIN — Medication 145 MILLIGRAM(S): at 11:30

## 2021-10-24 RX ADMIN — Medication 50 MILLIGRAM(S): at 11:30

## 2021-10-24 RX ADMIN — SENNA PLUS 2 TABLET(S): 8.6 TABLET ORAL at 21:13

## 2021-10-24 RX ADMIN — APIXABAN 5 MILLIGRAM(S): 2.5 TABLET, FILM COATED ORAL at 21:13

## 2021-10-24 RX ADMIN — TAMSULOSIN HYDROCHLORIDE 0.4 MILLIGRAM(S): 0.4 CAPSULE ORAL at 21:12

## 2021-10-24 RX ADMIN — PREGABALIN 1000 MICROGRAM(S): 225 CAPSULE ORAL at 19:00

## 2021-10-24 RX ADMIN — SIMVASTATIN 10 MILLIGRAM(S): 20 TABLET, FILM COATED ORAL at 21:13

## 2021-10-24 NOTE — PROGRESS NOTE ADULT - SUBJECTIVE AND OBJECTIVE BOX
CASSANDRA BLISS, 81y, Male  MRN-[id number]  Patient is a 81y old  Male who presents with a chief complaint of knee pain (23 Oct 2021 17:06)      OVERNIGHT EVENTS: naeo     SUBJECTIVE: Patient seen and examined at bedside. Denies fevers, chills, HA, chest pain, sob, nausea, vomiting, abdominal pain, diarrhea, constipation, dysuria.      Vital Signs Last 24 Hrs  T(C): 36.8 (24 Oct 2021 09:22), Max: 36.8 (23 Oct 2021 16:42)  T(F): 98.3 (24 Oct 2021 09:22), Max: 98.3 (23 Oct 2021 16:42)  HR: 85 (24 Oct 2021 09:22) (63 - 87)  BP: 148/80 (24 Oct 2021 09:22) (119/70 - 148/80)  BP(mean): --  RR: 18 (24 Oct 2021 09:22) (17 - 20)  SpO2: 98% (24 Oct 2021 09:22) (97% - 98%)        PHYSICAL EXAM:    General: elderly man lying in bed in nad   HEENT: NC/AT; EOMI, PERRLA, anicteric sclera; moist mucosal membranes.  Neck: supple, trachea midline  Cardiovascular: RRR, +S1/S2; NO M/R/G  Respiratory: CTA B/L; no W/R/R  Gastrointestinal: soft, NT/ND; +BSx4  Extremities: right knee larger than left knee; right knee warm to touch; improved flexion of right knee   Vascular: 2+ radial, DP/PT pulses B/L  Neurological: no focal deficits deficits    ALLERGIES:  Allergies    No Known Allergies    Intolerances      MEDICATIONS  (STANDING):  allopurinol 50 milliGRAM(s) Oral daily  apixaban 5 milliGRAM(s) Oral every 12 hours  buPROPion XL (24-Hour) . 300 milliGRAM(s) Oral daily  cyanocobalamin 1000 MICROGram(s) Oral every 24 hours  digoxin     Tablet 125 MICROGram(s) Oral daily  fenofibrate Tablet 145 milliGRAM(s) Oral daily  polyethylene glycol 3350 17 Gram(s) Oral two times a day  senna 2 Tablet(s) Oral at bedtime  simvastatin 10 milliGRAM(s) Oral at bedtime  tamsulosin 0.4 milliGRAM(s) Oral at bedtime    MEDICATIONS  (PRN):  acetaminophen   Tablet .. 650 milliGRAM(s) Oral every 6 hours PRN Temp greater or equal to 38C (100.4F), Mild Pain (1 - 3)  LORazepam   Injectable 1 milliGRAM(s) IV Push every 2 hours PRN CIWA-Ar score increase by 2 points and a total score of 7 or less  oxyCODONE    IR 5 milliGRAM(s) Oral every 4 hours PRN Severe Pain (7 - 10)  traMADol 25 milliGRAM(s) Oral every 4 hours PRN Moderate Pain (4 - 6)        -------------------------------------------------------------------------------  LABS:                            9.0    5.58  )-----------( 205      ( 23 Oct 2021 06:52 )             27.8   10-23    138  |  105  |  22  ----------------------------<  113<H>  4.3   |  24  |  1.21    Ca    9.7      23 Oct 2021 06:52  Phos  3.5     10-23  Mg     2.1     10-23    TPro  7.5  /  Alb  3.4  /  TBili  0.6  /  DBili  x   /  AST  51<H>  /  ALT  41  /  AlkPhos  129<H>  10-23            COVID-19 PCR: NotDetec (18 Oct 2021 15:39)      RADIOLOGY & ADDITIONAL TESTS: Reviewed.

## 2021-10-24 NOTE — PROGRESS NOTE ADULT - PROBLEM SELECTOR PROBLEM 2
Systemic inflammatory response syndrome (SIRS)
Gout
Systemic inflammatory response syndrome (SIRS)
Systemic inflammatory response syndrome (SIRS)
Gout
Gout

## 2021-10-24 NOTE — PROGRESS NOTE ADULT - PROBLEM SELECTOR PLAN 7
Hx of renal cancer (unclear type) with L nephrectomy.  Baseline Cr unclear  Cr upon admission 1.50.  UA negative.  - avoid nephrotoxic meds  - renally adjust meds  - no NSAIDS  - s/p 2.3L NS in ED  - f/u urine lytes    #Elevated bilirubin   Elevated bilirubin 1.5 upon admission.  Abd nontender.  Cotto's sign negative.  - trend CMP  - f/u AM fractionated bilirubin
Hx of renal cancer (unclear type) with L nephrectomy.  Baseline Cr unclear  Cr upon admission 1.50.  UA negative.  - avoid nephrotoxic meds  - renally adjust meds  - no NSAIDS  - s/p 2.3L NS in ED  - f/u urine lytes    #Elevated bilirubin   Elevated bilirubin 1.5 upon admission.  Abd nontender.  Cotto's sign negative.  - trend CMP  - f/u AM fractionated bilirubin
Patient reports multiple falls in past, lives alone, does not know all of his medications and requires help with his medications.  Also states he's been feeling depressed.  - PT ricardo  - SW consult
Hx of renal cancer (unclear type) with L nephrectomy.  Baseline Cr unclear  Cr upon admission 1.50.  UA negative.  - avoid nephrotoxic meds  - renally adjust meds  - no NSAIDS  - s/p 2.3L NS in ED  - f/u urine lytes    #Elevated bilirubin   Elevated bilirubin 1.5 upon admission.  Abd nontender.  Cotto's sign negative.  - trend CMP  - f/u AM fractionated bilirubin
Patient reports multiple falls in past, lives alone, does not know all of his medications and requires help with his medications.  Also states he's been feeling depressed.  - PT ricardo  - SW consult
Patient reports multiple falls in past, lives alone, does not know all of his medications and requires help with his medications.  Also states he's been feeling depressed.  - PT ricardo  - SW consult

## 2021-10-24 NOTE — PROGRESS NOTE ADULT - PROBLEM SELECTOR PROBLEM 6
plan of care explained/side rails up/assisted to bedside commode/meal provided/repositioned
side rails up/plan of care explained/repositioned/warm blanket provided/wait time explained
Major depression
Major depression
Encounter for social work intervention
Major depression
Encounter for social work intervention
Encounter for social work intervention

## 2021-10-24 NOTE — PROGRESS NOTE ADULT - PROBLEM SELECTOR PLAN 11
F: none  E: replete K<4, Mg<2  N: DASH  GI proph: none  DVT proph: hep gtt  Dispo: [facility]
F: none  E: replete K<4, Mg<2  N: DASH  GI proph: none  DVT proph: hep gtt  Dispo: [facility]
F: none  E: replete K<4, Mg<2  N: DASH  GI proph: none  DVT proph: hep gtt  Dispo: [facility]
Denies hx of BPH although per chart review on tamsulosin 0.4mg.  - c/w home med  - official med rec
Denies hx of BPH although per chart review and office med rec patient is on tamsulosin 0.4mg.  - c/w home med tamsulosin 0.4mg
Denies hx of BPH although per chart review on tamsulosin 0.4mg.  - c/w home med  - official med rec

## 2021-10-24 NOTE — PROGRESS NOTE ADULT - PROBLEM SELECTOR PROBLEM 4
Afib
S/P MVR (mitral valve replacement)
S/P MVR (mitral valve replacement)
Afib
Afib
S/P MVR (mitral valve replacement)

## 2021-10-24 NOTE — PROGRESS NOTE ADULT - PROBLEM SELECTOR PLAN 1
RESOLVED  - XRay knee negative  - Arthrocentesis with many RBC  - avoid NSAIDS 2/2 elevated Cr, however if suspicion for gout flare remains high, start colchicine   - f/u gram stain--no organisms seen, few wbc  - f/u joint cx--NGTD (cx acquired after abx started)   - f/u uric acid level--wnl
- XRay knee negative  - Arthrocentesis with many RBC  - avoid NSAIDS 2/2 elevated Cr, however if suspicion for gout flare remains high, start colchicine   - f/u gram stain--no organisms seen, few wbc  - f/u joint cx--NGTD (cx acquired after abx started)   - f/u uric acid level--wnl   - f/u gc/chlamydia screen  - f/u ortho recs
- XRay knee negative  - Arthrocentesis with many RBC  - avoid NSAIDS 2/2 elevated Cr, however if suspicion for gout flare remains high, start colchicine   - f/u gram stain--no organisms seen, few wbc  - f/u joint cx--NGTD (cx acquired after abx started)   - f/u uric acid level--wnl
- XRay knee negative  - Arthrocentesis with many RBC  - avoid NSAIDS 2/2 elevated Cr, however if suspicion for gout flare remains high, start colchicine   - f/u gram stain--no organisms seen, few wbc  - f/u joint cx--NGTD (cx acquired after abx started)   - f/u uric acid level--wnl
- XRay knee negative  - Arthrocentesis with many RBC  - avoid NSAIDS 2/2 elevated Cr, however if suspicion for gout flare remains high, start colchicine   - f/u gram stain--no organisms seen, few wbc  - f/u joint cx--NGTD (cx acquired after abx started)   - f/u uric acid level--wnl   - f/u gc/chlamydia screen  - f/u ortho recs
- XRay knee negative  - Arthrocentesis with many WBC, f/u gram stain and cx  - avoid NSAIDS 2/2 elevated Cr, however if suspicion for gout flare remains high, start colchicine   - f/u gram stain  - f/u uric acid level  - gc/chlamydia screen  - ortho does not recommend any intervention

## 2021-10-24 NOTE — PROGRESS NOTE ADULT - PROBLEM SELECTOR PROBLEM 7
Elevated serum creatinine
Encounter for social work intervention
Elevated serum creatinine
Encounter for social work intervention
Elevated serum creatinine
Encounter for social work intervention

## 2021-10-24 NOTE — PROGRESS NOTE ADULT - PROBLEM SELECTOR PLAN 9
Denies hx of HTN although per chart review on carvedilol 6.25mg and lasix 20mg qd.  - hold i/s/o renal insufficiency and sepsis  - monitor BP  - official med rec    #HLD  Denies hx of HLD although per chart review and med rec with clinic both confirm fenofibrate 145mg qd and simvastatin 10mg qhs.  - c/w home meds fenofibrate 145mg qd and simvastatin 10mg qhs  - AM lipid panel
Hgb 10.0 upon admission.  Unclear baseline.  No active signs of bleeding on exam.  - f/u AM iron studies, folate, B12  - daily CBC
Denies hx of HTN although per chart review on carvedilol 6.25mg and lasix 20mg qd.  - hold i/s/o renal insufficiency and sepsis  - monitor BP  - official med rec    #HLD  Denies hx of HLD although per chart review and med rec with clinic both confirm fenofibrate 145mg qd and simvastatin 10mg qhs.  - c/w home meds fenofibrate 145mg qd and simvastatin 10mg qhs  - AM lipid panel
Denies hx of HTN although per chart review on carvedilol 6.25mg and lasix 20mg qd.  - hold i/s/o renal insufficiency and sepsis  - monitor BP  - official med rec    #HLD  Denies hx of HLD although per chart review and med rec with clinic both confirm fenofibrate 145mg qd and simvastatin 10mg qhs.  - c/w home meds fenofibrate 145mg qd and simvastatin 10mg qhs  - AM lipid panel
Hgb 10.0 upon admission.  Unclear baseline.  No active signs of bleeding on exam.  - f/u AM iron studies, folate, B12  - daily CBC
Hgb 10.0 upon admission.  Unclear baseline.  No active signs of bleeding on exam.  - f/u AM iron studies, folate, B12  - daily CBC

## 2021-10-24 NOTE — PROGRESS NOTE ADULT - PROBLEM SELECTOR PLAN 3
Hx of gout on allopurinol 300mg qd.  Patient states he realized he has not been taking the medication for a month.  - c/w allopurinol at 50mg qd i/s/o renal impairment  - f/u uric acid--WNL  - colchicine if in flare, as pt with reduced kidney function
Patient does not have a mitral valve replacement per echo result. Per conversation with cards Dr. Bennett, there is no need for coumadin given valve is repair and annuloplasty.   - d/c warfarin and transition to eliquis for afib
Patient does not have a mitral valve replacement per echo result. Per conversation with cards Dr. Bennett, there is no need for coumadin given valve is repair and annuloplasty.   - d/c warfarin  -c/w eliquis for afib
Patient does not have a mitral valve replacement per echo result. Per conversation with cards Dr. Bennett, there is no need for coumadin given valve is repair and annuloplasty.   - d/c warfarin  -c/w eliquis for afib
Hx of gout on allopurinol 300mg qd.  Patient states he realized he has not been taking the medication for a month.  - c/w allopurinol at 50mg qd i/s/o renal impairment  - f/u uric acid--WNL  - colchicine if in flare, as pt with reduced kidney function
Hx of gout on allopurinol 300mg qd.  Patient states he realized he has not been taking the medication for a month.  - restart allopurinol at 50mg qd i/s/o renal impairment  - f/u uric acid  - colchicine if in flare, as pt with reduced kidney function

## 2021-10-24 NOTE — PROGRESS NOTE ADULT - PROBLEM SELECTOR PROBLEM 11
BPH (benign prostatic hyperplasia)
Nutrition, metabolism, and development symptoms
Nutrition, metabolism, and development symptoms
BPH (benign prostatic hyperplasia)
BPH (benign prostatic hyperplasia)
Nutrition, metabolism, and development symptoms

## 2021-10-24 NOTE — PROGRESS NOTE ADULT - ASSESSMENT
81yoM poor historian with PMHx gout, depression, MVR (1994, on warfarin), L nephrectomy 2/2 kidney CA (unclear type), substance abuse, ?HTN, ?Afib, ?HLD, ?BPH presents with R knee pain x4 days s/p arthrocentesis showing increased nucleated cell count found to be septic with unknown source now on Vanc and Zosyn. 
IMPRESSION:  Suspect gout flair vs septic arthritis.  On my exam, his knee does not appear infected.  Synovial fluid with elevated WBC however this can be seen in gout vs septic arthritis.  Gout can also cause fevers and pain in the knee    Recommend:  1.  Continue vancomycin 1 gram IV daily.  Check trough 30 minutes before the 4th floor  2.  Continue Ceftriaxone 2 grams IV daily.   3.  It would be helpful to obtain records from recent admission at [place]  4.  Follow up blood and synovial fluid cultures    ID team 2 will follow 
per Internal Medicine    80 yo M medina historian with PMHx gout, depression, MVR (1994, on warfarin), L nephrectomy 2/2 kidney CA (unclear type), substance abuse, ?HTN, ?Afib, ?HLD, ?BPH presents with R knee pain x4 days s/p arthrocentesis showing increased nucleated cell count found to be septic with unknown source now on Vanc and Zosyn.     Problem/Plan - 1:  ·  Problem: Right knee pain.   ·  Plan: - XRay knee negative  - Arthrocentesis with many RBC  - avoid NSAIDS 2/2 elevated Cr, however if suspicion for gout flare remains high, start colchicine   - f/u gram stain--no organisms seen, few wbc  - f/u joint cx--NGTD (cx acquired after abx started)   - f/u uric acid level--wnl   - f/u gc/chlamydia screen  - f/u ortho recs.    Problem/Plan - 2:  ·  Problem: Systemic inflammatory response syndrome (SIRS).   ·  Plan: Meets 3/4 SIRS with elevated WBC, fever, tachycardia.  Unclear signs of infection as UA and CXR clear.  Denies any diarrhea.  No signs of skin infection on exam.  - consult ID for abx recs  - f/u BCx--NGTD  - f/u UCx--<10,000 normal urogenital hilda.    Problem/Plan - 3:  ·  Problem: Gout.   ·  Plan: Hx of gout on allopurinol 300mg qd.  Patient states he realized he has not been taking the medication for a month.  - c/w allopurinol at 50mg qd i/s/o renal impairment  - f/u uric acid--WNL  - colchicine if in flare, as pt with reduced kidney function.    Problem/Plan - 4:  ·  Problem: S/P MVR (mitral valve replacement).   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 INR upon admission 1.8.  Unclear type of valve and duration of therapy, thus unable to determine appropriate goal (2-3 vs. 2.5-3.5).  - obtain collateral regarding valve hx (from his facility)   - c/w home coumadin 5mg nightly   - Ordered TTE, f/u.    Problem/Plan - 5:  ·  Problem: Afib.   ·  Plan: Unclear hx of afib, although reports he's on coumadin and digoxin (chart review 125mcg qd).  Heart irregular upon auscultation.  EKG with afib.  - c/w home dose digoxin  - c/w home dose warfarin   - monitor dig levels   - start hep gtt for full AC.    Problem/Plan - 6:  ·  Problem: Major depression.   ·  Plan: Hx of depression and currently reporting feeling depressed and more confused lately.  Denies any SI/HI.  On buproprion 300mg qd per chart review.  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  Has not had a drink in 7-8 days but did drink ~2 bottles of wine at that time.  Is having relationship difficulties with his girlfriend.  Also reports remote hx of marijuana and cocaine use.  Denies sexual activity at this time.    - low risk CIWA  - HIV screen, hep panel  - Ordered abdominal US 2/2 abdominal distension - WNL.    Problem/Plan - 7:  ·  Problem: Encounter for social work intervention.   ·  Plan: Patient reports multiple falls in past, lives alone, does not know all of his medications and requires help with his medications.  Also states he's been feeling depressed.  - PT eval  - SW consult.    Problem/Plan - 8:  ·  Problem: Elevated serum creatinine.   ·  Plan: Hx of renal cancer (unclear type) with L nephrectomy.  Baseline Cr unclear  Cr upon admission 1.50.  UA negative.  - avoid nephrotoxic meds  - renally adjust meds  - no NSAIDS  - s/p 2.3L NS in ED  - f/u urine lytes    #Elevated bilirubin   Elevated bilirubin 1.5 upon admission.  Abd nontender.  Cotto's sign negative.  - trend CMP  - f/u AM fractionated bilirubin.    Problem/Plan - 9:  ·  Problem: Anemia.   ·  Plan: Hgb 10.0 upon admission.  Unclear baseline.  No active signs of bleeding on exam.  - f/u AM iron studies, folate, B12  - daily CBC.    Problem/Plan - 10:  ·  Problem: HTN (hypertension).   ·  Plan; Denies hx of HTN although per chart review on carvedilol 6.25mg and lasix 20mg qd.  - hold i/s/o renal insufficiency and sepsis  - monitor BP  - official med rec    #HLD  Denies hx of HLD although per chart review and med rec with clinic both confirm fenofibrate 145mg qd and simvastatin 10mg qhs.  - c/w home meds fenofibrate 145mg qd and simvastatin 10mg qhs  - AM lipid panel.    Problem/Plan - 11:  ·  Problem: BPH (benign prostatic hyperplasia).   ·  Plan: Denies hx of BPH although per chart review on tamsulosin 0.4mg.  - c/w home med  - official med rec.    Problem/Plan - 12:  ·  Problem: Nutrition, metabolism, and development symptoms.   ·  Plan: F: none  E: replete K<4, Mg<2  N: DASH  GI proph: none  DVT proph: hep gtt  Dispo: RMF.    
IMPRESSION:  Suspect gout flair.  Low suspicion for septic arthritis given negative cultures and exam.  On my exam, his knee does not appear infected.  Synovial fluid with elevated WBC however this can be seen in gout vs septic arthritis.  Gout can also cause fevers and pain in the knee    Recommend:  1.  Can stop IV antibiotics  2.  Follow up blood and joint fluid culture.  If they turn positive, then IV antibiotics will need to be resumed.  3.  He will need close follow up outpatient to monitor cultures/symptoms     ID team 2 will follow 
per Internal Medicine    81 yoM medina historian with PMHx gout, depression, MVR (1994, on warfarin), L nephrectomy 2/2 kidney CA (unclear type), substance abuse, ?HTN, ?Afib, ?HLD, ?BPH presents with R knee pain x4 days s/p arthrocentesis showing increased nucleated cell count found to be septic with unknown source now on Vanc and Zosyn.     Problem/Plan - 1:  ·  Problem: Right knee pain.   ·  Plan: - XRay knee negative  - Arthrocentesis with many RBC  - avoid NSAIDS 2/2 elevated Cr, however if suspicion for gout flare remains high, start colchicine   - f/u gram stain--no organisms seen, few wbc  - f/u joint cx--NGTD (cx acquired after abx started)   - f/u uric acid level--wnl.    Problem/Plan - 2:  ·  Problem: Gout.   ·  Plan: Hx of gout on allopurinol 300mg qd.  Patient states he realized he has not been taking the medication for a month.  - c/w allopurinol at 50mg qd i/s/o renal impairment  - f/u uric acid--WNL  - colchicine if in flare, as pt with reduced kidney function.    Problem/Plan - 3:  ·  Problem: S/P MVR (mitral valve replacement).   ·  Plan: Patient does not have a mitral valve replacement per echo result. Per conversation with cards Dr. Bennett, there is no need for coumadin given valve is repair and annuloplasty.   - d/c warfarin and transition to eliquis for afib.    Problem/Plan - 4:  ·  Problem: Afib.   ·  Plan: Unclear hx of afib, although reports he's on coumadin and digoxin (chart review 125mcg qd).  Heart irregular upon auscultation.  EKG with afib.  - c/w home dose digoxin  - transition to eliquis   - monitor dig levels   - start hep gtt for full AC.    Problem/Plan - 5:  ·  Problem: Major depression.   ·  Plan: Hx of depression and currently reporting feeling depressed and more confused lately.  Denies any SI/HI.  On buproprion 300mg qd per chart review.  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  Has not had a drink in 7-8 days but did drink ~2 bottles of wine at that time.  Is having relationship difficulties with his girlfriend.  Also reports remote hx of marijuana and cocaine use.  Denies sexual activity at this time.    - low risk CIWA  - HIV screen, hep panel  - Ordered abdominal US 2/2 abdominal distension - WNL.    Problem/Plan - 6:  ·  Problem: Encounter for social work intervention.   ·  Plan: Patient reports multiple falls in past, lives alone, does not know all of his medications and requires help with his medications.  Also states he's been feeling depressed.  - PT rec d/c to home  - f/u SW.    Problem/Plan - 7:  ·  Problem: Elevated serum creatinine.   ·  Plan: Hx of renal cancer (unclear type) with L nephrectomy.  Baseline Cr unclear  Cr upon admission 1.50.  UA negative.  - avoid nephrotoxic meds  - renally adjust meds  - no NSAIDS  - s/p 2.3L NS in ED  - f/u urine lytes    #Elevated bilirubin   Elevated bilirubin 1.5 upon admission.  Abd nontender.  Cotto's sign negative.  - trend CMP  - f/u AM fractionated bilirubin.    Problem/Plan - 8:  ·  Problem: Anemia.   ·  Plan: Hgb 10.0 upon admission.  Unclear baseline.  No active signs of bleeding on exam.  - f/u AM iron studies, folate, B12  - daily CBC.    Problem/Plan - 9:  ·  Problem: HTN (hypertension).   ·  Plan: Denies hx of HTN although per chart review on carvedilol 6.25mg and lasix 20mg qd.  - hold i/s/o renal insufficiency and sepsis  - monitor BP  - official med rec    #HLD  Denies hx of HLD although per chart review and med rec with clinic both confirm fenofibrate 145mg qd and simvastatin 10mg qhs.  - c/w home meds fenofibrate 145mg qd and simvastatin 10mg qhs  - AM lipid panel.    Problem/Plan - 10:  ·  Problem: BPH (benign prostatic hyperplasia).   ·  Plan; Denies hx of BPH although per chart review and office med rec patient is on tamsulosin 0.4mg.  - c/w home med tamsulosin 0.4mg.    Problem/Plan - 11:  ·  Problem: Nutrition, metabolism, and development symptoms.   ·  Plan: F: none  E: replete K<4, Mg<2  N: DASH  GI proph: none  DVT proph: hep gtt  Dispo: [facility].    
81yoM poor historian with PMHx gout, depression, MVR (1994, on warfarin), L nephrectomy 2/2 kidney CA (unclear type), substance abuse, ?HTN, ?Afib, ?HLD, ?BPH presents with R knee pain x4 days s/p arthrocentesis showing increased nucleated cell count found to be septic with unknown source now on Vanc and Zosyn. 

## 2021-10-24 NOTE — PROGRESS NOTE ADULT - PROBLEM SELECTOR PROBLEM 3
Gout
Gout
S/P MVR (mitral valve replacement)
S/P MVR (mitral valve replacement)
Gout
S/P MVR (mitral valve replacement)

## 2021-10-24 NOTE — PROGRESS NOTE ADULT - PROBLEM SELECTOR PLAN 8
Hgb 10.0 upon admission.  Unclear baseline.  No active signs of bleeding on exam.  - f/u AM iron studies, folate, B12  - daily CBC
Hx of renal cancer (unclear type) with L nephrectomy.  Baseline Cr unclear  Cr upon admission 1.50.  UA negative.  - avoid nephrotoxic meds  - renally adjust meds  - no NSAIDS  - s/p 2.3L NS in ED  - f/u urine lytes    #Elevated bilirubin   Elevated bilirubin 1.5 upon admission.  Abd nontender.  Cotto's sign negative.  - trend CMP  - f/u AM fractionated bilirubin

## 2021-10-24 NOTE — PROGRESS NOTE ADULT - PROBLEM SELECTOR PROBLEM 10
BPH (benign prostatic hyperplasia)
HTN (hypertension)
HTN (hypertension)
BPH (benign prostatic hyperplasia)
HTN (hypertension)
BPH (benign prostatic hyperplasia)

## 2021-10-25 ENCOUNTER — TRANSCRIPTION ENCOUNTER (OUTPATIENT)
Age: 81
End: 2021-10-25

## 2021-10-25 VITALS
SYSTOLIC BLOOD PRESSURE: 122 MMHG | RESPIRATION RATE: 18 BRPM | TEMPERATURE: 98 F | DIASTOLIC BLOOD PRESSURE: 71 MMHG | HEART RATE: 86 BPM | OXYGEN SATURATION: 96 %

## 2021-10-25 LAB
CULTURE RESULTS: SIGNIFICANT CHANGE UP
SARS-COV-2 RNA SPEC QL NAA+PROBE: SIGNIFICANT CHANGE UP
SPECIMEN SOURCE: SIGNIFICANT CHANGE UP

## 2021-10-25 PROCEDURE — 99238 HOSP IP/OBS DSCHRG MGMT 30/<: CPT

## 2021-10-25 RX ADMIN — APIXABAN 5 MILLIGRAM(S): 2.5 TABLET, FILM COATED ORAL at 09:21

## 2021-10-25 RX ADMIN — Medication 125 MICROGRAM(S): at 06:35

## 2021-10-25 RX ADMIN — Medication 50 MILLIGRAM(S): at 11:27

## 2021-10-25 RX ADMIN — BUPROPION HYDROCHLORIDE 300 MILLIGRAM(S): 150 TABLET, EXTENDED RELEASE ORAL at 11:27

## 2021-10-25 RX ADMIN — POLYETHYLENE GLYCOL 3350 17 GRAM(S): 17 POWDER, FOR SOLUTION ORAL at 06:36

## 2021-10-25 RX ADMIN — Medication 145 MILLIGRAM(S): at 11:27

## 2021-10-25 NOTE — DISCHARGE NOTE NURSING/CASE MANAGEMENT/SOCIAL WORK - NSDCVIVACCINE_GEN_ALL_CORE_FT
influenza, high-dose, quadrivalent; 22-Oct-2021 18:30; Dariela Rae (RN); [employer]; [id number] (Exp. Date: 30-Jun-2022); IntraMuscular; Deltoid Left.; 0.7 milliLiter(s); VIS (VIS Published: 06-Aug-2021, VIS Presented: 22-Oct-2021);

## 2021-10-25 NOTE — PROGRESS NOTE ADULT - REASON FOR ADMISSION
knee pain

## 2021-10-25 NOTE — DISCHARGE NOTE NURSING/CASE MANAGEMENT/SOCIAL WORK - PATIENT PORTAL LINK FT
You can access the FollowMyHealth Patient Portal offered by [facility] by registering at the following website: [email or web address]. By joining [employer]’s FollowMyHealth portal, you will also be able to view your health information using other applications (apps) compatible with our system.

## 2021-10-25 NOTE — PROGRESS NOTE ADULT - PROVIDER SPECIALTY LIST ADULT
Internal Medicine
Orthopedics
Infectious Disease
Infectious Disease
Rehab Medicine
Rehab Medicine
Hospitalist
Internal Medicine

## 2021-10-25 NOTE — PROGRESS NOTE ADULT - SUBJECTIVE AND OBJECTIVE BOX
Ortho Progress Note    Subjective:  Pt comfortable without complaints, pain controlled.  Denies CP, SOB, N/V, numbness/tingling     Objective:  Vital Signs Last 24 Hrs  T(C): 36.6 (25 Oct 2021 06:09), Max: 36.8 (24 Oct 2021 09:22)  T(F): 97.8 (25 Oct 2021 06:09), Max: 98.3 (24 Oct 2021 09:22)  HR: 79 (25 Oct 2021 06:09) (77 - 85)  BP: 142/66 (25 Oct 2021 06:09) (121/76 - 148/80)  BP(mean): --  RR: 19 (25 Oct 2021 06:09) (16 - 19)  SpO2: 99% (25 Oct 2021 06:09) (97% - 99%)    Physical Exam:  General: NAD, resting comfortably in bed  Dressing C/D/I  Sensation: SILT L2-S1  Motor: Full painless passive/active flexion/extension of b/l knees  5/5 EHL/FHL/TA/GS b/l  Toes wwp    A/P: 81yMale w/ right knee pain, acute gout flare vs r/o septic arthritis right knee. Minimal suspicion for septic knee at this time.  -No acute intervention  -WBAT, PT  -Pain control as needed  -Ortho will sign off    Ortho Pager [phone]

## 2021-11-02 DIAGNOSIS — I10 ESSENTIAL (PRIMARY) HYPERTENSION: ICD-10-CM

## 2021-11-02 DIAGNOSIS — N40.0 BENIGN PROSTATIC HYPERPLASIA WITHOUT LOWER URINARY TRACT SYMPTOMS: ICD-10-CM

## 2021-11-02 DIAGNOSIS — D64.9 ANEMIA, UNSPECIFIED: ICD-10-CM

## 2021-11-02 DIAGNOSIS — M00.9 PYOGENIC ARTHRITIS, UNSPECIFIED: ICD-10-CM

## 2021-11-02 DIAGNOSIS — M10.9 GOUT, UNSPECIFIED: ICD-10-CM

## 2021-11-02 DIAGNOSIS — Z91.14 PATIENT'S OTHER NONCOMPLIANCE WITH MEDICATION REGIMEN: ICD-10-CM

## 2021-11-02 DIAGNOSIS — A41.9 SEPSIS, UNSPECIFIED ORGANISM: ICD-10-CM

## 2021-11-02 DIAGNOSIS — Z91.81 HISTORY OF FALLING: ICD-10-CM

## 2021-11-02 DIAGNOSIS — Z79.01 LONG TERM (CURRENT) USE OF ANTICOAGULANTS: ICD-10-CM

## 2021-11-02 DIAGNOSIS — Z85.528 PERSONAL HISTORY OF OTHER MALIGNANT NEOPLASM OF KIDNEY: ICD-10-CM

## 2021-11-02 DIAGNOSIS — Z95.2 PRESENCE OF PROSTHETIC HEART VALVE: ICD-10-CM

## 2021-11-02 DIAGNOSIS — M25.461 EFFUSION, RIGHT KNEE: ICD-10-CM

## 2021-11-02 DIAGNOSIS — I44.7 LEFT BUNDLE-BRANCH BLOCK, UNSPECIFIED: ICD-10-CM

## 2021-11-02 DIAGNOSIS — M25.561 PAIN IN RIGHT KNEE: ICD-10-CM

## 2021-11-02 DIAGNOSIS — F19.19 OTHER PSYCHOACTIVE SUBSTANCE ABUSE WITH UNSPECIFIED PSYCHOACTIVE SUBSTANCE-INDUCED DISORDER: ICD-10-CM

## 2021-11-02 DIAGNOSIS — Z90.5 ACQUIRED ABSENCE OF KIDNEY: ICD-10-CM

## 2021-11-02 DIAGNOSIS — E78.5 HYPERLIPIDEMIA, UNSPECIFIED: ICD-10-CM

## 2021-11-02 DIAGNOSIS — F32.9 MAJOR DEPRESSIVE DISORDER, SINGLE EPISODE, UNSPECIFIED: ICD-10-CM

## 2021-11-02 DIAGNOSIS — I48.91 UNSPECIFIED ATRIAL FIBRILLATION: ICD-10-CM

## 2021-11-08 LAB
CULTURE RESULTS: NO GROWTH — SIGNIFICANT CHANGE UP
SPECIMEN SOURCE: SIGNIFICANT CHANGE UP

## 2021-12-22 PROCEDURE — 83550 IRON BINDING TEST: CPT

## 2021-12-22 PROCEDURE — 86140 C-REACTIVE PROTEIN: CPT

## 2021-12-22 PROCEDURE — 80061 LIPID PANEL: CPT

## 2021-12-22 PROCEDURE — 87070 CULTURE OTHR SPECIMN AEROBIC: CPT

## 2021-12-22 PROCEDURE — 86850 RBC ANTIBODY SCREEN: CPT

## 2021-12-22 PROCEDURE — 73560 X-RAY EXAM OF KNEE 1 OR 2: CPT

## 2021-12-22 PROCEDURE — 86704 HEP B CORE ANTIBODY TOTAL: CPT

## 2021-12-22 PROCEDURE — 87205 SMEAR GRAM STAIN: CPT

## 2021-12-22 PROCEDURE — 93306 TTE W/DOPPLER COMPLETE: CPT

## 2021-12-22 PROCEDURE — 86780 TREPONEMA PALLIDUM: CPT

## 2021-12-22 PROCEDURE — 97161 PT EVAL LOW COMPLEX 20 MIN: CPT

## 2021-12-22 PROCEDURE — 83605 ASSAY OF LACTIC ACID: CPT

## 2021-12-22 PROCEDURE — 82248 BILIRUBIN DIRECT: CPT

## 2021-12-22 PROCEDURE — 82746 ASSAY OF FOLIC ACID SERUM: CPT

## 2021-12-22 PROCEDURE — 80162 ASSAY OF DIGOXIN TOTAL: CPT

## 2021-12-22 PROCEDURE — 87389 HIV-1 AG W/HIV-1&-2 AB AG IA: CPT

## 2021-12-22 PROCEDURE — 84484 ASSAY OF TROPONIN QUANT: CPT

## 2021-12-22 PROCEDURE — 84550 ASSAY OF BLOOD/URIC ACID: CPT

## 2021-12-22 PROCEDURE — 90662 IIV NO PRSV INCREASED AG IM: CPT

## 2021-12-22 PROCEDURE — 89051 BODY FLUID CELL COUNT: CPT

## 2021-12-22 PROCEDURE — 93005 ELECTROCARDIOGRAM TRACING: CPT

## 2021-12-22 PROCEDURE — 80053 COMPREHEN METABOLIC PANEL: CPT

## 2021-12-22 PROCEDURE — 97116 GAIT TRAINING THERAPY: CPT

## 2021-12-22 PROCEDURE — 86803 HEPATITIS C AB TEST: CPT

## 2021-12-22 PROCEDURE — 80202 ASSAY OF VANCOMYCIN: CPT

## 2021-12-22 PROCEDURE — 93971 EXTREMITY STUDY: CPT

## 2021-12-22 PROCEDURE — 83735 ASSAY OF MAGNESIUM: CPT

## 2021-12-22 PROCEDURE — 84100 ASSAY OF PHOSPHORUS: CPT

## 2021-12-22 PROCEDURE — 85025 COMPLETE CBC W/AUTO DIFF WBC: CPT

## 2021-12-22 PROCEDURE — 87635 SARS-COV-2 COVID-19 AMP PRB: CPT

## 2021-12-22 PROCEDURE — 83880 ASSAY OF NATRIURETIC PEPTIDE: CPT

## 2021-12-22 PROCEDURE — 97110 THERAPEUTIC EXERCISES: CPT

## 2021-12-22 PROCEDURE — 76700 US EXAM ABDOM COMPLETE: CPT

## 2021-12-22 PROCEDURE — 87591 N.GONORRHOEAE DNA AMP PROB: CPT

## 2021-12-22 PROCEDURE — 82550 ASSAY OF CK (CPK): CPT

## 2021-12-22 PROCEDURE — 86900 BLOOD TYPING SEROLOGIC ABO: CPT

## 2021-12-22 PROCEDURE — 86709 HEPATITIS A IGM ANTIBODY: CPT

## 2021-12-22 PROCEDURE — 89060 EXAM SYNOVIAL FLUID CRYSTALS: CPT

## 2021-12-22 PROCEDURE — 87086 URINE CULTURE/COLONY COUNT: CPT

## 2021-12-22 PROCEDURE — 86705 HEP B CORE ANTIBODY IGM: CPT

## 2021-12-22 PROCEDURE — U0005: CPT

## 2021-12-22 PROCEDURE — 82247 BILIRUBIN TOTAL: CPT

## 2021-12-22 PROCEDURE — U0003: CPT

## 2021-12-22 PROCEDURE — 82728 ASSAY OF FERRITIN: CPT

## 2021-12-22 PROCEDURE — 87040 BLOOD CULTURE FOR BACTERIA: CPT

## 2021-12-22 PROCEDURE — 94640 AIRWAY INHALATION TREATMENT: CPT

## 2021-12-22 PROCEDURE — 81001 URINALYSIS AUTO W/SCOPE: CPT

## 2021-12-22 PROCEDURE — 87075 CULTR BACTERIA EXCEPT BLOOD: CPT

## 2021-12-22 PROCEDURE — 82607 VITAMIN B-12: CPT

## 2021-12-22 PROCEDURE — 86769 SARS-COV-2 COVID-19 ANTIBODY: CPT

## 2021-12-22 PROCEDURE — 71045 X-RAY EXAM CHEST 1 VIEW: CPT

## 2021-12-22 PROCEDURE — 84443 ASSAY THYROID STIM HORMONE: CPT

## 2021-12-22 PROCEDURE — 83540 ASSAY OF IRON: CPT

## 2021-12-22 PROCEDURE — 20610 DRAIN/INJ JOINT/BURSA W/O US: CPT

## 2021-12-22 PROCEDURE — 36415 COLL VENOUS BLD VENIPUNCTURE: CPT

## 2021-12-22 PROCEDURE — 87340 HEPATITIS B SURFACE AG IA: CPT

## 2021-12-22 PROCEDURE — 85730 THROMBOPLASTIN TIME PARTIAL: CPT

## 2021-12-22 PROCEDURE — 96375 TX/PRO/DX INJ NEW DRUG ADDON: CPT

## 2021-12-22 PROCEDURE — 86901 BLOOD TYPING SEROLOGIC RH(D): CPT

## 2021-12-22 PROCEDURE — 86706 HEP B SURFACE ANTIBODY: CPT

## 2021-12-22 PROCEDURE — 87491 CHLMYD TRACH DNA AMP PROBE: CPT

## 2021-12-22 PROCEDURE — 99285 EMERGENCY DEPT VISIT HI MDM: CPT | Mod: 25

## 2021-12-22 PROCEDURE — 85652 RBC SED RATE AUTOMATED: CPT

## 2021-12-22 PROCEDURE — 85610 PROTHROMBIN TIME: CPT

## 2021-12-22 PROCEDURE — 84466 ASSAY OF TRANSFERRIN: CPT

## 2021-12-22 PROCEDURE — 96374 THER/PROPH/DIAG INJ IV PUSH: CPT | Mod: XU

## 2022-11-29 ENCOUNTER — INPATIENT (INPATIENT)
Facility: HOSPITAL | Age: 82
LOS: 2 days | Discharge: HOME | DRG: 378 | End: 2022-12-02
Attending: STUDENT IN AN ORGANIZED HEALTH CARE EDUCATION/TRAINING PROGRAM | Admitting: STUDENT IN AN ORGANIZED HEALTH CARE EDUCATION/TRAINING PROGRAM
Payer: MEDICARE

## 2022-11-29 VITALS
WEIGHT: 175.05 LBS | HEIGHT: 68 IN | OXYGEN SATURATION: 99 % | TEMPERATURE: 98 F | RESPIRATION RATE: 18 BRPM | SYSTOLIC BLOOD PRESSURE: 137 MMHG | HEART RATE: 78 BPM | DIASTOLIC BLOOD PRESSURE: 71 MMHG

## 2022-11-29 DIAGNOSIS — Z90.5 ACQUIRED ABSENCE OF KIDNEY: Chronic | ICD-10-CM

## 2022-11-29 DIAGNOSIS — I10 ESSENTIAL (PRIMARY) HYPERTENSION: ICD-10-CM

## 2022-11-29 DIAGNOSIS — M10.9 GOUT, UNSPECIFIED: ICD-10-CM

## 2022-11-29 DIAGNOSIS — Z98.890 OTHER SPECIFIED POSTPROCEDURAL STATES: Chronic | ICD-10-CM

## 2022-11-29 DIAGNOSIS — E78.5 HYPERLIPIDEMIA, UNSPECIFIED: ICD-10-CM

## 2022-11-29 DIAGNOSIS — Z29.9 ENCOUNTER FOR PROPHYLACTIC MEASURES, UNSPECIFIED: ICD-10-CM

## 2022-11-29 DIAGNOSIS — D64.9 ANEMIA, UNSPECIFIED: ICD-10-CM

## 2022-11-29 DIAGNOSIS — K92.1 MELENA: ICD-10-CM

## 2022-11-29 DIAGNOSIS — F32.9 MAJOR DEPRESSIVE DISORDER, SINGLE EPISODE, UNSPECIFIED: ICD-10-CM

## 2022-11-29 DIAGNOSIS — Z95.4 PRESENCE OF OTHER HEART-VALVE REPLACEMENT: ICD-10-CM

## 2022-11-29 DIAGNOSIS — I48.20 CHRONIC ATRIAL FIBRILLATION, UNSPECIFIED: ICD-10-CM

## 2022-11-29 PROBLEM — I48.91 UNSPECIFIED ATRIAL FIBRILLATION: Chronic | Status: ACTIVE | Noted: 2021-10-19

## 2022-11-29 PROBLEM — C64.9 MALIGNANT NEOPLASM OF UNSPECIFIED KIDNEY, EXCEPT RENAL PELVIS: Chronic | Status: ACTIVE | Noted: 2021-10-19

## 2022-11-29 LAB
ALBUMIN SERPL ELPH-MCNC: 4 G/DL — SIGNIFICANT CHANGE UP (ref 3.3–5)
ALP SERPL-CCNC: 48 U/L — SIGNIFICANT CHANGE UP (ref 40–120)
ALT FLD-CCNC: 10 U/L — SIGNIFICANT CHANGE UP (ref 10–45)
ANION GAP SERPL CALC-SCNC: 8 MMOL/L — SIGNIFICANT CHANGE UP (ref 5–17)
APTT BLD: 37 SEC — HIGH (ref 27.5–35.5)
AST SERPL-CCNC: 16 U/L — SIGNIFICANT CHANGE UP (ref 10–40)
BASOPHILS # BLD AUTO: 0.03 K/UL — SIGNIFICANT CHANGE UP (ref 0–0.2)
BASOPHILS NFR BLD AUTO: 0.6 % — SIGNIFICANT CHANGE UP (ref 0–2)
BILIRUB SERPL-MCNC: 0.6 MG/DL — SIGNIFICANT CHANGE UP (ref 0.2–1.2)
BLD GP AB SCN SERPL QL: NEGATIVE — SIGNIFICANT CHANGE UP
BUN SERPL-MCNC: 19 MG/DL — SIGNIFICANT CHANGE UP (ref 7–23)
CALCIUM SERPL-MCNC: 9.2 MG/DL — SIGNIFICANT CHANGE UP (ref 8.4–10.5)
CHLORIDE SERPL-SCNC: 106 MMOL/L — SIGNIFICANT CHANGE UP (ref 96–108)
CO2 SERPL-SCNC: 27 MMOL/L — SIGNIFICANT CHANGE UP (ref 22–31)
CREAT SERPL-MCNC: 1.59 MG/DL — HIGH (ref 0.5–1.3)
EGFR: 43 ML/MIN/1.73M2 — LOW
EOSINOPHIL # BLD AUTO: 0.24 K/UL — SIGNIFICANT CHANGE UP (ref 0–0.5)
EOSINOPHIL NFR BLD AUTO: 4.7 % — SIGNIFICANT CHANGE UP (ref 0–6)
GLUCOSE SERPL-MCNC: 89 MG/DL — SIGNIFICANT CHANGE UP (ref 70–99)
HCT VFR BLD CALC: 22.4 % — LOW (ref 39–50)
HGB BLD-MCNC: 6.8 G/DL — CRITICAL LOW (ref 13–17)
IMM GRANULOCYTES NFR BLD AUTO: 0.2 % — SIGNIFICANT CHANGE UP (ref 0–0.9)
INR BLD: 1.46 — HIGH (ref 0.88–1.16)
LYMPHOCYTES # BLD AUTO: 0.68 K/UL — LOW (ref 1–3.3)
LYMPHOCYTES # BLD AUTO: 13.3 % — SIGNIFICANT CHANGE UP (ref 13–44)
MCHC RBC-ENTMCNC: 23.5 PG — LOW (ref 27–34)
MCHC RBC-ENTMCNC: 30.4 GM/DL — LOW (ref 32–36)
MCV RBC AUTO: 77.5 FL — LOW (ref 80–100)
MONOCYTES # BLD AUTO: 0.67 K/UL — SIGNIFICANT CHANGE UP (ref 0–0.9)
MONOCYTES NFR BLD AUTO: 13.1 % — SIGNIFICANT CHANGE UP (ref 2–14)
NEUTROPHILS # BLD AUTO: 3.48 K/UL — SIGNIFICANT CHANGE UP (ref 1.8–7.4)
NEUTROPHILS NFR BLD AUTO: 68.1 % — SIGNIFICANT CHANGE UP (ref 43–77)
NRBC # BLD: 0 /100 WBCS — SIGNIFICANT CHANGE UP (ref 0–0)
PLATELET # BLD AUTO: 172 K/UL — SIGNIFICANT CHANGE UP (ref 150–400)
POTASSIUM SERPL-MCNC: 4.6 MMOL/L — SIGNIFICANT CHANGE UP (ref 3.5–5.3)
POTASSIUM SERPL-SCNC: 4.6 MMOL/L — SIGNIFICANT CHANGE UP (ref 3.5–5.3)
PROT SERPL-MCNC: 7 G/DL — SIGNIFICANT CHANGE UP (ref 6–8.3)
PROTHROM AB SERPL-ACNC: 17.5 SEC — HIGH (ref 10.5–13.4)
RBC # BLD: 2.89 M/UL — LOW (ref 4.2–5.8)
RBC # FLD: 19.1 % — HIGH (ref 10.3–14.5)
RH IG SCN BLD-IMP: POSITIVE — SIGNIFICANT CHANGE UP
SARS-COV-2 RNA SPEC QL NAA+PROBE: NEGATIVE — SIGNIFICANT CHANGE UP
SODIUM SERPL-SCNC: 141 MMOL/L — SIGNIFICANT CHANGE UP (ref 135–145)
WBC # BLD: 5.11 K/UL — SIGNIFICANT CHANGE UP (ref 3.8–10.5)
WBC # FLD AUTO: 5.11 K/UL — SIGNIFICANT CHANGE UP (ref 3.8–10.5)

## 2022-11-29 PROCEDURE — 93010 ELECTROCARDIOGRAM REPORT: CPT

## 2022-11-29 PROCEDURE — 99223 1ST HOSP IP/OBS HIGH 75: CPT | Mod: GC

## 2022-11-29 PROCEDURE — 99285 EMERGENCY DEPT VISIT HI MDM: CPT

## 2022-11-29 PROCEDURE — 99222 1ST HOSP IP/OBS MODERATE 55: CPT

## 2022-11-29 RX ORDER — SENNA PLUS 8.6 MG/1
2 TABLET ORAL
Qty: 0 | Refills: 0 | DISCHARGE

## 2022-11-29 RX ORDER — DIGOXIN 250 MCG
125 TABLET ORAL DAILY
Refills: 0 | Status: DISCONTINUED | OUTPATIENT
Start: 2022-11-29 | End: 2022-12-02

## 2022-11-29 RX ORDER — BUPROPION HYDROCHLORIDE 150 MG/1
1 TABLET, EXTENDED RELEASE ORAL
Qty: 0 | Refills: 0 | DISCHARGE

## 2022-11-29 RX ORDER — CHOLECALCIFEROL (VITAMIN D3) 125 MCG
1 CAPSULE ORAL
Qty: 0 | Refills: 0 | DISCHARGE

## 2022-11-29 RX ORDER — INFLUENZA VIRUS VACCINE 15; 15; 15; 15 UG/.5ML; UG/.5ML; UG/.5ML; UG/.5ML
0.7 SUSPENSION INTRAMUSCULAR ONCE
Refills: 0 | Status: DISCONTINUED | OUTPATIENT
Start: 2022-11-29 | End: 2022-12-02

## 2022-11-29 RX ORDER — PANTOPRAZOLE SODIUM 20 MG/1
8 TABLET, DELAYED RELEASE ORAL
Qty: 80 | Refills: 0 | Status: DISCONTINUED | OUTPATIENT
Start: 2022-11-29 | End: 2022-12-02

## 2022-11-29 RX ORDER — SOD SULF/SODIUM/NAHCO3/KCL/PEG
4000 SOLUTION, RECONSTITUTED, ORAL ORAL ONCE
Refills: 0 | Status: COMPLETED | OUTPATIENT
Start: 2022-11-29 | End: 2022-11-29

## 2022-11-29 RX ORDER — SIMVASTATIN 20 MG/1
1 TABLET, FILM COATED ORAL
Qty: 0 | Refills: 0 | DISCHARGE

## 2022-11-29 RX ORDER — PANTOPRAZOLE SODIUM 20 MG/1
80 TABLET, DELAYED RELEASE ORAL ONCE
Refills: 0 | Status: COMPLETED | OUTPATIENT
Start: 2022-11-29 | End: 2022-11-29

## 2022-11-29 RX ORDER — DIGOXIN 250 MCG
1 TABLET ORAL
Qty: 0 | Refills: 0 | DISCHARGE

## 2022-11-29 RX ORDER — TAMSULOSIN HYDROCHLORIDE 0.4 MG/1
1 CAPSULE ORAL
Qty: 0 | Refills: 0 | DISCHARGE

## 2022-11-29 RX ORDER — SIMVASTATIN 20 MG/1
10 TABLET, FILM COATED ORAL AT BEDTIME
Refills: 0 | Status: DISCONTINUED | OUTPATIENT
Start: 2022-11-29 | End: 2022-12-02

## 2022-11-29 RX ORDER — CARVEDILOL PHOSPHATE 80 MG/1
0 CAPSULE, EXTENDED RELEASE ORAL
Qty: 0 | Refills: 0 | DISCHARGE

## 2022-11-29 RX ORDER — CARVEDILOL PHOSPHATE 80 MG/1
6.25 CAPSULE, EXTENDED RELEASE ORAL EVERY 12 HOURS
Refills: 0 | Status: DISCONTINUED | OUTPATIENT
Start: 2022-11-29 | End: 2022-12-02

## 2022-11-29 RX ORDER — ALLOPURINOL 300 MG
0.5 TABLET ORAL DAILY
Refills: 0 | Status: DISCONTINUED | OUTPATIENT
Start: 2022-11-29 | End: 2022-11-30

## 2022-11-29 RX ORDER — SODIUM CHLORIDE 9 MG/ML
1000 INJECTION INTRAMUSCULAR; INTRAVENOUS; SUBCUTANEOUS ONCE
Refills: 0 | Status: COMPLETED | OUTPATIENT
Start: 2022-11-29 | End: 2022-11-29

## 2022-11-29 RX ORDER — BUPROPION HYDROCHLORIDE 150 MG/1
300 TABLET, EXTENDED RELEASE ORAL DAILY
Refills: 0 | Status: DISCONTINUED | OUTPATIENT
Start: 2022-11-29 | End: 2022-12-02

## 2022-11-29 RX ORDER — FENOFIBRATE,MICRONIZED 130 MG
1 CAPSULE ORAL
Qty: 0 | Refills: 0 | DISCHARGE

## 2022-11-29 RX ORDER — OMEPRAZOLE 10 MG/1
0 CAPSULE, DELAYED RELEASE ORAL
Qty: 0 | Refills: 0 | DISCHARGE

## 2022-11-29 RX ADMIN — SIMVASTATIN 10 MILLIGRAM(S): 20 TABLET, FILM COATED ORAL at 22:43

## 2022-11-29 RX ADMIN — BUPROPION HYDROCHLORIDE 300 MILLIGRAM(S): 150 TABLET, EXTENDED RELEASE ORAL at 22:43

## 2022-11-29 RX ADMIN — PANTOPRAZOLE SODIUM 10 MG/HR: 20 TABLET, DELAYED RELEASE ORAL at 16:31

## 2022-11-29 RX ADMIN — PANTOPRAZOLE SODIUM 80 MILLIGRAM(S): 20 TABLET, DELAYED RELEASE ORAL at 15:12

## 2022-11-29 RX ADMIN — SODIUM CHLORIDE 1000 MILLILITER(S): 9 INJECTION INTRAMUSCULAR; INTRAVENOUS; SUBCUTANEOUS at 13:57

## 2022-11-29 RX ADMIN — Medication 4000 MILLILITER(S): at 23:48

## 2022-11-29 NOTE — CONSULT NOTE ADULT - ASSESSMENT
82 M poor historian, [facility] A-Fib (on Eliquis), HTN, HLD, chronic gout, depression, kidney cancer s/p L nephrectomy presenting to the ED c/o a couple of episodes of lightheadedness. Cardiology consulted for perioperative risk stratification for EGD/Colonoscopy. 82 M poor historian, [facility] A-Fib (on Eliquis), HTN, HLD, chronic gout, depression, kidney cancer s/p L nephrectomy presenting to the ED c/o a couple of episodes of lightheadedness. Cardiology consulted for perioperative risk stratification for EGD/Colonoscopy.    ECG: Rate controled atrial fibrillation and LBBB, with poor R wave progression 82 M poor historian, [facility] A-Fib (on Eliquis), HTN, HLD, chronic gout, depression, kidney cancer s/p L nephrectomy presenting to the ED c/o a couple of episodes of lightheadedness. Cardiology consulted for perioperative risk stratification for EGD/Colonoscopy.    Review of Studies  ECG: Rate controled atrial fibrillation and LBBB, with poor R wave progression  TTE 10/21/21: Grossly, left ventricular function appears mildly reduced. Biatrial enlargement. The posterior mitral valve is mildly thickened and calcified. There is mild to moderate eccentric MR. Normal RV size and systolic function. Moderate TR. PASP 56 mmHg. 82 M poor historian, [facility] A-Fib (on Eliquis), HTN, HLD, chronic gout, depression, kidney cancer s/p L nephrectomy presenting to the ED c/o a couple of episodes of lightheadedness. Cardiology consulted for perioperative risk stratification for EGD/Colonoscopy.    Review of Studies    ECG: Rate controlled atrial fibrillation and LBBB, with poor R wave progression    TTE 10/21/21: Grossly, left ventricular function appears mildly reduced. Biatrial enlargement. The posterior mitral valve is mildly thickened and calcified. There is mild to moderate eccentric MR. Normal RV size and systolic function. Moderate TR. PASP 56 mmHg.    #Perioperative Risk Stratification  - METS ~ 4 with mildly reduced ED, pulmonary hypertension, atrial fibrillation and mild to moderate MR, appears ___ on exam.  - Can obtain non-urgent echocardiogram  - Intermediate cardiac risk for a low risk/urgent procedure.    #Atrial Fibrillation  ECG on admission with rate controlled atrial fibrillation.   CHADsVASC - 3 (HTN, Age, ?CHF). On Digoxin 0.125 and Coreg 6.125mg BID at home.  - Holding Eliquis in setting of GIB pending EGD/Colonoscopy  - Please obtain Digoxin level in AM  - c/w Coreg 6.125mg BID (Hold HR <60, SBP <100)    #Cardiomyopathy  TTE in 2021 notable for bi-atrial enlargement with mildly reduced EF, mild to mod MR and PASP 56. Currently hemodynamically stable and appears ___ on exam. ECG with LBBB (). On Lasix 20mg, Coreg 6.125mg at home.  - Please obtain TTE   - Cardiology will follow.   82 M poor historian, [facility] A-Fib (on Eliquis), HTN, HLD, chronic gout, depression, kidney cancer s/p L nephrectomy presenting to the ED c/o a couple of episodes of lightheadedness. Cardiology consulted for perioperative risk stratification for EGD/Colonoscopy.    Review of Studies    ECG: Rate controlled atrial fibrillation and LBBB, with poor R wave progression    TTE 10/21/21: Grossly, left ventricular function appears mildly reduced. Biatrial enlargement. The posterior mitral valve is mildly thickened and calcified. There is mild to moderate eccentric MR. Normal RV size and systolic function. Moderate TR. PASP 56 mmHg.    #Perioperative Risk Stratification  - METS ~ 4 with mildly reduced ED, pulmonary hypertension, atrial fibrillation and mild to moderate MR, appears grossly euvolemic  on exam. On room air laying flat, CVP 3-6 with clear lungs bilaterally, noted 1+ lower extremity edema.  - Obtain non-urgent echocardiogram  - Intermediate cardiac risk for a low risk/urgent procedure.    #Atrial Fibrillation  ECG on admission with rate controlled atrial fibrillation.   CHADsVASC - 3 (HTN, Age). On Digoxin 0.125 and Coreg 6.125mg BID at home.  - Holding Eliquis in setting of GIB pending EGD/Colonoscopy  - Please obtain Digoxin level  - Agree with holding beta blocker if there is concern for active bleeding and hemodynamic compromise, otherwise c/w coreg 6.125mg BID (Hold HR <60, SBP <100)    #Cardiomyopathy  TTE in 2021 notable for bi-atrial enlargement with mildly reduced EF, mild to mod MR and PASP 56. Currently hemodynamically stable and appears grossly euvolemic on physical exam. ECG with LBBB (). On Lasix 20mg, Coreg 6.125mg at home.  - Please obtain TTE   - Can hold off on diuresis for now  - Follows up at [facility] with Dr Torre at [facility] - please obtain records.   82 M poor historian, [facility] A-Fib (on Eliquis), HTN, HLD, chronic gout, depression, kidney cancer s/p L nephrectomy, MVR, presenting to the ED c/o a couple of episodes of lightheadedness. Cardiology consulted for perioperative risk stratification for EGD/Colonoscopy.    Review of Studies    ECG: Rate controlled atrial fibrillation and LBBB, with poor R wave progression    TTE 10/21/21: Grossly, left ventricular function appears mildly reduced. Biatrial enlargement. The posterior mitral valve is mildly thickened and calcified. There is mild to moderate eccentric MR. Normal RV size and systolic function. Moderate TR. PASP 56 mmHg.    #Perioperative Risk Stratification  - METS ~ 4 with mildly reduced ED, pulmonary hypertension, atrial fibrillation and mild to moderate MR, appears grossly euvolemic  on exam. On room air laying flat, CVP 3-6 with clear lungs bilaterally, noted 1+ lower extremity edema.  - Obtain non-urgent echocardiogram  - Intermediate cardiac risk for a low risk/urgent procedure.    #Atrial Fibrillation  ECG on admission with rate controlled atrial fibrillation.   CHADsVASC - 3 (HTN, Age). On Digoxin 0.125 and Coreg 6.125mg BID at home. s/p MVR  - Holding Eliquis in setting of GIB pending EGD/Colonoscopy  - Please obtain Digoxin level  - Agree with holding beta blocker if there is concern for active bleeding and hemodynamic compromise, otherwise c/w coreg 6.125mg BID (Hold HR <60, SBP <100)    #Cardiomyopathy s/p MVR  TTE in 2021 notable for bi-atrial enlargement with mildly reduced EF, mild to mod MR and PASP 56. Currently hemodynamically stable and appears grossly euvolemic on physical exam. ECG with LBBB (). On Lasix 20mg, Coreg 6.125mg at home.  - Please obtain TTE   - Can hold off on diuresis for now  - Follows up at [facility] with Dr Torre at [facility] - please obtain records.

## 2022-11-29 NOTE — H&P ADULT - ASSESSMENT
81 y/o Male, poor historian, with a PMHx of A-Fib on Eliquis, and HTN presenting with CC of lightheadness found to be symptomatically anemic with melena, admitted for workup and EDG/colonoscopy.

## 2022-11-29 NOTE — H&P ADULT - NSHPPHYSICALEXAM_GEN_ALL_CORE
PHYSICAL EXAM:  GENERAL: NAD, sitting in bed comfortably  HEAD:  Atraumatic, Normocephalic  EYES: PERRLA, exophthalmos, conjunctiva and sclera clear  ENT: Moist mucous membranes  NECK: Supple  CHEST/LUNG: Clear to auscultation bilaterally.  HEART: Irregular rate and rhythm; No murmurs or rubs.  ABDOMEN: Soft, nontender, distended.  No pain on deep or light palpation.   EXTREMITIES:  2+ Peripheral Pulses. 1+ pitting edema bilaterally.  Significant varicosities.  R knee with palpable swelling above knee.  TTP around knee cap.  No erythema or fluctuance  NERVOUS SYSTEM:  A&Ox3, no focal deficits. However, difficulty flexing right hip.  SKIN: No rashes or lesions

## 2022-11-29 NOTE — PATIENT PROFILE ADULT - FALL HARM RISK - HARM RISK INTERVENTIONS
Assistance with ambulation/Assistance OOB with selected safe patient handling equipment/Communicate Risk of Fall with Harm to all staff/Discuss with provider need for PT consult/Monitor gait and stability/Provide patient with walking aids - walker, cane, crutches/Reinforce activity limits and safety measures with patient and family/Sit up slowly, dangle for a short time, stand at bedside before walking/Tailored Fall Risk Interventions/Visual Cue: Yellow wristband and red socks/Bed in lowest position, wheels locked, appropriate side rails in place/Call bell, personal items and telephone in reach/Instruct patient to call for assistance before getting out of bed or chair/Non-slip footwear when patient is out of bed/[place] to call system/Physically safe environment - no spills, clutter or unnecessary equipment/Purposeful Proactive Rounding/Room/bathroom lighting operational, light cord in reach

## 2022-11-29 NOTE — CONSULT NOTE ADULT - ATTENDING COMMENTS
Late entry. Patient seen and examined on evening of 11/29/22.   Patient express being very thirsty but open to endoscopy 11/30/22. Plan for bidirectional endoscopic evaluation to assess etiology of acute on chronic anemia.

## 2022-11-29 NOTE — ED ADULT TRIAGE NOTE - CHIEF COMPLAINT QUOTE
Pt BIBEMS from home, "I stood up to ask my daughter what time we were leaving for the appointment and got so light headed I though I was going to fall over." Pt denies room-spinning dizziness, denies focal numbness/weakness or difficulty speaking. "I feel a little better but I'm nauseous. I also fell on sunday due to the same thing." Hx of afib and htn. Denies active CP or SOB.

## 2022-11-29 NOTE — ED ADULT NURSE NOTE - NSIMPLEMENTINTERV_GEN_ALL_ED
Implemented All Universal Safety Interventions:  [place] to call system. Call bell, personal items and telephone within reach. Instruct patient to call for assistance. Room bathroom lighting operational. Non-slip footwear when patient is off stretcher. Physically safe environment: no spills, clutter or unnecessary equipment. Stretcher in lowest position, wheels locked, appropriate side rails in place. Specific interventions were implemented:

## 2022-11-29 NOTE — H&P ADULT - ATTENDING COMMENTS
Pt. seen and examined by me earlier today; I have read Dr. Guevara's H&P, I agree w/ his findings and plan of care as documented; cont. work-up and mgmt per GI recs; plan for EGD and colonoscopy tomorrow; on IV PPI gtt for now; transfusing 1U PRBCs, will monitor CBC; checking TTE, per Cardiology recs; holding DOAC for now

## 2022-11-29 NOTE — H&P ADULT - PROBLEM SELECTOR PLAN 1
Pt admitted with Hb of 6.8, reported melena, likely 2/2 GIB and possible outpatient visit for bright red blood in rectum. s/p 1u pRBC  - GI Consulted, plan for EGD/Colonoscopy  - Cardiology Clearance  - PPI ggt  - NPO after midnight  - Golytely  - Post transfusion cbc  - 2 Large bore IVs  - Transfuse if Hb <7 Pt admitted with Hb of 6.8, reported melena, likely 2/2 GIB and possible outpatient visit for bright red blood in rectum. s/p 1u pRBC  - GI Consulted, plan for EGD/Colonoscopy  - Per cardiology, obtain repeat echo.   - PPI ggt  - NPO after midnight  - Golytely  - Post transfusion cbc  - 2 Large bore IVs  - Transfuse if Hb <7

## 2022-11-29 NOTE — H&P ADULT - PROBLEM SELECTOR PROBLEM 1
Brief Pre-Op Note/Sedation Assessment      Eli Sneed  11/7/1934  [id number]      [id number]  2:21 AM    Planned Procedure: Cardiac Catheterization Procedure    Post Procedure Plan: Return to same level of care    Consent: I have discussed with the patient and/or the patient representative the indication, alternatives, and the possible risks and/or complications of the planned procedure and the anesthesia methods. The patient and/or patient representative appear to understand and agree to proceed. Chief Complaint: STEMI      Indications for Cath Procedure:  ACS <= 24 hrs  Anginal Classification within 2 weeks:  CCS I - Angina only during strenuous or prolonged physical activity  NYHA Heart Failure Class within 2 weeks: Class II - Symptoms of HF on ordinary exertion, Newly Diagnosed? Yes, Heart Failure Type: Diastolic  Is Cath Lab Visit Valve-related?: No  Surgical Risk: Intermediate  Functional Type: < 4 METS    Anti- Anginal Meds within 2 weeks:   No: Contraindicated  No meds but is on Eliquis and amiodarone because of atrial fibrillation. Patient is on very high flow oxygen because of severe respiratory illness. Stress or Imaging Studies Performed (within 6 months):  None     Vital Signs:  /61   Pulse 74   Temp (P) 98.8 °F (37.1 °C) (Axillary)   Resp (!) 41   Ht 5' 3\" (1.6 m)   Wt 167 lb 15.9 oz (76.2 kg)   SpO2 (!) 86%   BMI 29.76 kg/m²     Allergies:   Allergies   Allergen Reactions    Sulfa Antibiotics        Past Medical History:  Past Medical History:   Diagnosis Date    Arthritis     Atrial fibrillation, transient ([address]     Transient ischemic attack (TIA)          Surgical History:  Past Surgical History:   Procedure Laterality Date    ABDOMEN SURGERY      uterine CA historectomy    CHOLECYSTECTOMY      JOINT REPLACEMENT  05/2018    Right knee         Medications:  Current Facility-Administered Medications   Medication Dose Route Frequency Provider Last Rate Last Admin  nitroGLYCERIN 50 mg in dextrose 5% 250 mL infusion  5-200 mcg/min Intravenous Continuous Kerry Hill MD 6 mL/hr at 08/16/21 0043 20 mcg/min at 08/16/21 0043    aspirin EC tablet 325 mg  325 mg Oral Daily Kerry Hill MD        cefepime (MAXIPIME) 2000 mg IVPB minibag  2,000 mg Intravenous Q12H Keon Chávez MD   Stopped at 08/15/21 1714    furosemide (LASIX) injection 40 mg  40 mg Intravenous BID Keon Chávez MD   40 mg at 08/15/21 1821    dexamethasone (PF) (DECADRON) injection 10 mg  10 mg Intravenous Q24H Keon Chávez MD   10 mg at 08/15/21 1639    lactobacillus (CULTURELLE) capsule 1 capsule  1 capsule Oral Daily with breakfast Keon Chávez MD   1 capsule at 08/15/21 0835    pantoprazole (PROTONIX) tablet 40 mg  40 mg Oral QAM AC Keon Chávez MD   40 mg at 08/15/21 0551    amiodarone (CORDARONE) tablet 100 mg  100 mg Oral Daily TOÑO Bustos CNP   100 mg at 08/15/21 0835    apixaban (ELIQUIS) tablet 5 mg  5 mg Oral BID TOÑO Parikh CNP   5 mg at 08/15/21 2035    ciprofloxacin (CILOXAN) 0.3 % ophthalmic solution 1 drop  1 drop Both Eyes 2 times per day Keon Chávez MD   1 drop at 08/15/21 0835    dilTIAZem (CARDIZEM CD) extended release capsule 120 mg  120 mg Oral Daily TOÑO Bustos CNP   120 mg at 08/15/21 0835    [Held by provider] lisinopril (PRINIVIL;ZESTRIL) tablet 40 mg  40 mg Oral Daily TOÑO Bustos - CNP        sodium chloride flush 0.9 % injection 5-40 mL  5-40 mL Intravenous 2 times per day TOÑO Parikh - CNP   10 mL at 08/16/21 0015    sodium chloride flush 0.9 % injection 5-40 mL  5-40 mL Intravenous PRN TOÑO Bustos - CNP        0.9 % sodium chloride infusion  25 mL Intravenous PRN TOÑO Parikh CNP   Paused at 08/15/21 1821    acetaminophen (TYLENOL) tablet 650 mg  650 mg Oral Q6H PRN TOÑO Parikh CNP   650 mg at 08/15/21 2034    Or    acetaminophen (TYLENOL) suppository 650 mg 650 mg Rectal Q6H PRN Yissel S Puthoff, APRN - CNP        benzonatate (TESSALON) capsule 100 mg  100 mg Oral TID PRN James Sol, APRN - CNP   100 mg at 08/15/21 0835    ipratropium-albuterol (DUONEB) nebulizer solution 1 ampule  1 ampule Inhalation Q6H PRN Yissel S Puthoff, APRN - CNP   1 ampule at 08/13/21 0908           Pre-Sedation:    Pre-Sedation Documentation and Exam:  I have personally completed a history, physical exam & review of systems for this patient (see notes). Prior History of Anesthesia Complications:   none    Modified Mallampati:  I (soft palate, uvula, fauces, tonsillar pillars visible)    ASA Classification:  Class 3 - A patient with severe systemic disease that limits activity but is not incapacitating      Isael Scale: Activity:  2 - Able to move 4 extremities voluntarily on command  Respiration:  2 - Able to breathe deeply and cough freely  Circulation:  2 - BP+/- 20mmHg of normal  Consciousness:  2 - Fully awake  Oxygen Saturation (color):  1 - Needs oxygen to maintain oxygen saturation >90%    Sedation/Anesthesia Plan:  Guard the patient's safety and welfare. Minimize physical discomfort and pain. Minimize negative psychological responses to treatment by providing sedation and analgesia and maximize the potential amnesia. Patient to meet pre-procedure discharge plan.     Medication Planned:  None    Patient is an appropriate candidate for plan of sedation: no      Electronically signed by Pasha Hummel MD on 8/16/2021 at 2:21 AM Symptomatic anemia

## 2022-11-29 NOTE — H&P ADULT - PROBLEM SELECTOR PLAN 5
Chronic issue.  - Holding AC in the setting of bleed.   - Obtain additional collateral. Chronic issue.  - Holding AC in the setting of bleed.   - c/w digoxin 125 mcg qd  - Obtain additional collateral.

## 2022-11-29 NOTE — ED ADULT NURSE NOTE - INTERVENTIONS DEFINITIONS
[place] to call system/Call bell, personal items and telephone within reach/Instruct patient to call for assistance/Non-slip footwear when patient is off stretcher/Physically safe environment: no spills, clutter or unnecessary equipment/Stretcher in lowest position, wheels locked, appropriate side rails in place/Provide visual cue, wrist band, yellow gown, etc./Monitor gait and stability/Monitor for mental status changes and reorient to person, place, and time/Reinforce activity limits and safety measures with patient and family

## 2022-11-29 NOTE — H&P ADULT - NSHPLABSRESULTS_GEN_ALL_CORE
LABS:                         6.8    5.11  )-----------( 172      ( 29 Nov 2022 14:13 )             22.4     11-29    141  |  106  |  19  ----------------------------<  89  4.6   |  27  |  1.59<H>    Ca    9.2      29 Nov 2022 14:13    TPro  7.0  /  Alb  4.0  /  TBili  0.6  /  DBili  x   /  AST  16  /  ALT  10  /  AlkPhos  48  11-29    PT/INR - ( 29 Nov 2022 14:55 )   PT: 17.5 sec;   INR: 1.46          PTT - ( 29 Nov 2022 14:55 )  PTT:37.0 sec              RADIOLOGY, EKG & ADDITIONAL TESTS:

## 2022-11-29 NOTE — H&P ADULT - PROBLEM SELECTOR PLAN 9
F: pRBC.   E: Replete as necessary K>4 Mg>2  N: NPO after midnight.     DVT Prophylaxis: Holding d/t GI Bleed.   GI prophylaxis: Protonix IV   CODE STATUS: FULL  DISPO: F

## 2022-11-29 NOTE — H&P ADULT - HISTORY OF PRESENT ILLNESS
81 y/o Male, poor historian, with a PMHx of A-Fib on Eliquis, and HTN presenting to the ED c/o a couple of episodes of lightheadedness this week. The first time was Sunday at [employer] and he felt weak causing him to fall, but did not completely pass out and denies any injuries. He also denied dizziness but endorsed lightheadedness. Today, he states he was getting ready to leave the house where he suddenly felt the same on Sundays' episode with no LOC. He called his home health aide who called EMS. Pt has been feeling generally weak in the last few days, endorsing a poor appetite but denying any recent illness or travel. Lives alone, but has an aid that comes 4x a week to help. In the ED his labs were significant forHb of 6.8 and he was admitted for symptomatic anemia and evaluation of possible GI bleed.     Of note, pt stated stated he was suppose to have a colonoscopy one month prior for dark stool - but missed appointment. He also said he was admitted previously at [facility] for a possible EGD but that was uncompleted for unknown reasons.      states he has been feeling generally weak in the last few days. Denies CP, SOB. Lives alone, but has an aid that comes 4x a week to help.    He doesnt believe he has been having blood in stool but does describe several years of mucous in stool.    Last colonoscopy >10 years ago.  Per chart review, was seen by IM on 11/1 for BRBPR and what appears to be a second opinion for the same issue.  Also noted to have unexplained iron deficiency anemia with Hgb in 9 A TTE was requested before proceeding by cardiologist.    REID neg in ED though 'blood' noted in ED on underwear.     PPI gtt initiated with 1 pRBC transfused    ED Course  T 98.2 /79 HR 64 RR 18 SpO2 95% RA  Interventions: Protonix, 1u pRBC, 1L NS   83 y/o Male, poor historian, with a PMHx of A-Fib on Eliquis, and HTN presenting to the ED c/o a couple of episodes of lightheadedness this week. The first time was Sunday at [employer] and he felt weak causing him to fall, but did not completely pass out and denies any injuries. He also denied dizziness but endorsed lightheadedness. Today, he states he was getting ready to leave the house where he suddenly felt the same on Sundays' episode with no LOC. He called his home health aide who called EMS. Pt has been feeling generally weak in the last few days, endorsing a poor appetite but denying any recent illness or travel. Lives alone, but has an aid that comes 4x a week to help. In the ED his labs were significant forHb of 6.8 and he was admitted for symptomatic anemia and evaluation of possible GI bleed.     Of note, pt stated stated he was suppose to have a colonoscopy one month prior for dark stool - but missed appointment. He also said he was admitted previously at [facility] for a possible EGD but that was uncompleted for unknown reasons.       ED Course  T 98.2 /79 HR 64 RR 18 SpO2 95% RA  Labs: Hb 6.8, Cr 1.59, BUN 19  Interventions: Protonix ggt, 1u pRBC, 1L NS   83 y/o Male, poor historian, with a PMHx of A-Fib on Eliquis, HTN, hyperlipidemia, chronic gout, depression,  presenting to the ED c/o a couple of episodes of lightheadedness this week. The first time was Sunday at [employer] and he felt weak causing him to fall, but did not completely pass out and denies any injuries. He also denied dizziness but endorsed lightheadedness. Today, he states he was getting ready to leave the house where he suddenly felt the same on Sundays' episode with no LOC. He called his home health aide who called EMS. Pt has been feeling generally weak in the last few days, endorsing a poor appetite but denying any recent illness or travel. Lives alone, but has an aid that comes 4x a week to help. In the ED his labs were significant forHb of 6.8 and he was admitted for symptomatic anemia and evaluation of possible GI bleed.     Of note, pt stated stated he was suppose to have a colonoscopy one month prior for dark stool - but missed appointment. He also said he was admitted previously at [facility] for a possible EGD but that was uncompleted for unknown reasons.       ED Course  T 98.2 /79 HR 64 RR 18 SpO2 95% RA  Labs: Hb 6.8, Cr 1.59, BUN 19  Interventions: Protonix ggt, 1u pRBC, 1L NS   81 y/o Male, poor historian, with a PMHx of A-Fib on Eliquis, HTN, hyperlipidemia, chronic gout, depression, kidney cancer s/p l. nephrectomy,  presenting to the ED c/o a couple of episodes of lightheadedness this week. The first time was Sunday at [employer] and he felt weak causing him to fall, but did not completely pass out and denies any injuries. He also denied dizziness but endorsed lightheadedness. Today, he states he was getting ready to leave the house where he suddenly felt the same on Sundays' episode with no LOC. He called his home health aide who called EMS. Pt has been feeling generally weak in the last few days, endorsing a poor appetite but denying any recent illness or travel. Lives alone, but has an aid that comes 4x a week to help. In the ED his labs were significant forHb of 6.8 and he was admitted for symptomatic anemia and evaluation of possible GI bleed.     Of note, pt stated stated he was suppose to have a colonoscopy one month prior for dark stool - but missed appointment. He also said he was admitted previously at [facility] for a possible EGD but that was uncompleted for unknown reasons.       ED Course  T 98.2 /79 HR 64 RR 18 SpO2 95% RA  Labs: Hb 6.8, Cr 1.59, BUN 19  Interventions: Protonix ggt, 1u pRBC, 1L NS

## 2022-11-29 NOTE — ED PROVIDER NOTE - CLINICAL SUMMARY MEDICAL DECISION MAKING FREE TEXT BOX
83 y/o Male with weakness and multiple episodes of near-syncope. Found to be anemic, 6.8, likely causing weakness. Will obtain consent, transfuse and admit for physical therapy, transfusion and work up for anemia. 83 y/o Male with weakness and multiple episodes of near-syncope. Found to be anemic, 6.8, likely causing weakness. Will obtain consent, transfuse and admit for physical therapy, transfusion and work up for anemia.    GI called and notified and aware of admission . PPI bolus and drip in ED.    Pt also noted to have hematoma to right hip but ambulating without difficulty in ED.  Pt states he was suppose to have colonoscopy one month prior for dark stool - but missed appt.

## 2022-11-29 NOTE — H&P ADULT - NSHPSOCIALHISTORY_GEN_ALL_CORE
Lives alone at [facility]. Receives home help from aide 4 days a week. No living relatives.

## 2022-11-29 NOTE — ED PROVIDER NOTE - OBJECTIVE STATEMENT
83 y/o Male, poor historian, with a PMHx of A-Fib on Eliquis, and HTN presenting to the ED c/o multiple episodes of near-syncope this week. Notes he was at [employer] on Sunday and felt weak causing him to fall, but did not completely pass out and denies any injuries. Today, states he was getting ready to leave the house where he suddenly felt the same on Sundays' episode with no LOC. Pt has been feeling generally weak in the last few days. Denies CP, SOB. Lives alone, but has an aid that comes 4x a week to help. Denies dark stool or bright red blood.

## 2022-11-29 NOTE — ED ADULT NURSE NOTE - OBJECTIVE STATEMENT
Pt received Alert and Oriented to person, time, place and situation, and speaks in complete sentences.  C.C : light headed, dizziness, nausea  Pt states compliance of HTN, Afib medication.   DeniesSOB, chest pain.  Pt is resting and awaiting MD order and evaluation.

## 2022-11-29 NOTE — PATIENT PROFILE ADULT - FUNCTIONAL SCREEN CURRENT LEVEL: COMMUNICATION, MLM
Bed: 11  Expected date:   Expected time:   Means of arrival:   Comments:  [facility]  10/30/20 1902 0 = understands/communicates without difficulty

## 2022-11-29 NOTE — H&P ADULT - PROBLEM SELECTOR PLAN 8
Per review of records, patient did struggle with depression in the past. Home medcicatoin: Buproprion 300mg qd    - c/w home medication.

## 2022-11-29 NOTE — CONSULT NOTE ADULT - SUBJECTIVE AND OBJECTIVE BOX
82M, poor historian, [facility] A-Fib on Eliquis, HTN, hyperlipidemia, chronic gout, depression, kidney cancer s/p l. nephrectomy presenting to the ED c/o a couple of episodes of lightheadedness this week. The first time was Sunday at [employer] and he felt weak causing him to fall, but did not completely pass out and denies any injuries. He also denied dizziness but endorsed lightheadedness. Today, he states he was getting ready to leave the house where he suddenly felt the same on Sundays' episode with no LOC. He called his home health aide who called EMS. Pt has been feeling generally weak in the last few days, endorsing a poor appetite but denying any recent illness or travel. Lives alone, but has an aid that comes 4x a week to help. In the ED his labs were significant forHb of 6.8 and he was admitted for symptomatic anemia and evaluation of possible GI bleed.     Of note, pt stated stated he was suppose to have a colonoscopy one month prior for dark stool - but missed appointment. He also said he was admitted previously at [facility] for a possible EGD but that was uncompleted for unknown reasons.       ED Course  T 98.2 /79 HR 64 RR 18 SpO2 95% RA  Labs: Hb 6.8, Cr 1.59, BUN 19  Interventions: Protonix ggt, 1u pRBC, 1L NS   (29 Nov 2022 17:31)    All:  Meds: Allopuriol 100, Eliquis 5mg BID, Wellbutrin 300, Coreg 6.125, Digoxin 0.125, Fenofibrate 145mg, Lasix 20 mg, Omeprazole 20, Simvastatin 20mg , Tamsulosin 0.4   PSH:  FH:  SH:     PAST MEDICAL & SURGICAL HISTORY:  Depression      Hypertension      HLD (hyperlipidemia)      Afib      Cancer of kidney      Gout      S/P MVR (mitral valve repair)      H/O left nephrectomy      PREVIOUS DIAGNOSTIC TESTING:      ECHO  FINDINGS: TTE  (10.21.21 @ 09:54)   1. Technically difficult study.   2. Left ventricular endocardium is not well visualized. Grossly, left ventricular function appears mildly reduced.   3. Biatrial enlargement. LVEF    4. The posterior mitral valve is mildly thickened and calcified.There is mild to moderateeccentric mitral regurgitation.   5. Normal right ventricular size and systolic function.   6. Moderate tricuspid regurgitation.   7. Pulmonary artery systolic pressure is 56 mmHg.   8. No pericardial effusion.   9. No obvious vegetations seen. However, consider RANDY to better visualize the valves and evaluate for endocarditis, if clinically indicated.    STRESS  FINDINGS: None    CATHETERIZATION  FINDINGS: None    MEDICATIONS  (STANDING):  pantoprazole Infusion 8 mG/Hr (10 mL/Hr) IV Continuous <Continuous>    MEDICATIONS  (PRN):      FAMILY HISTORY:  No pertinent family history in first degree relatives        Vital Signs Last 24 Hrs  T(C): 36.8 (29 Nov 2022 16:49), Max: 36.8 (29 Nov 2022 16:25)  T(F): 98.2 (29 Nov 2022 16:49), Max: 98.3 (29 Nov 2022 16:25)  HR: 64 (29 Nov 2022 16:49) (64 - 78)  BP: 139/79 (29 Nov 2022 16:49) (135/80 - 157/78)  RR: 18 (29 Nov 2022 16:49) (18 - 18)  SpO2: 99% (29 Nov 2022 16:49) (98% - 100%)    Parameters below as of 29 Nov 2022 16:49  Patient On (Oxygen Delivery Method): room air        PHYSICAL EXAM:    GENERAL: NAD, speaks in full sentences, no signs of respiratory distress  HEAD:  Atraumatic, Normocephalic  EYES: EOMI, PERRLA, conjunctiva and sclera clear  NECK: Supple, No JVD  CHEST/LUNG: Clear to auscultation bilaterally; No wheeze; No crackles; No accessory muscles used  HEART: Regular rate and rhythm; No murmurs;   ABDOMEN: Soft, Nontender, Nondistended; Bowel sounds present; No guarding  EXTREMITIES:  2+ Peripheral Pulses, No cyanosis or edema  PSYCH: AAOx3  NEUROLOGY: non-focal  SKIN: No rashes or lesions        INTERPRETATION OF TELEMETRY:    ECG:    I&O's Detail      LABS:                        6.8    5.11  )-----------( 172      ( 29 Nov 2022 14:13 )             22.4     11-29    141  |  106  |  19  ----------------------------<  89  4.6   |  27  |  1.59<H>    Ca    9.2      29 Nov 2022 14:13    TPro  7.0  /  Alb  4.0  /  TBili  0.6  /  DBili  x   /  AST  16  /  ALT  10  /  AlkPhos  48  11-29        PT/INR - ( 29 Nov 2022 14:55 )   PT: 17.5 sec;   INR: 1.46          PTT - ( 29 Nov 2022 14:55 )  PTT:37.0 sec    I&O's Summary    BNP  RADIOLOGY & ADDITIONAL STUDIES: 82M, poor historian, [facility] A-Fib on Eliquis, HTN, hyperlipidemia, depression, chronic gout, depression, HTN kidney cancer s/p L nephrectomy presenting to the ED c/o a couple of episodes of lightheadedness this week. The first time was Sunday at [employer] and he felt weak causing him to fall, but did not completely pass out and denies any injuries. He also denied dizziness but endorsed lightheadedness. Today, he states he was getting ready to leave the house where he suddenly felt the same on Sundays' episode with no LOC. He called his home health aide who called EMS. Pt has been feeling generally weak in the last few days, endorsing a poor appetite but denying any recent illness or travel. Lives alone, but has an aid that comes 4x a week to help. In the ED his labs were significant forHb of 6.8 and he was admitted for symptomatic anemia and evaluation of possible GI bleed.     Of note, pt stated stated he was suppose to have a colonoscopy one month prior for dark stool - but missed appointment. He also said he was admitted previously at [facility] for a possible EGD but that was uncompleted for unknown reasons.       ED Course:   T 98.2 /79 HR 64 RR 18 SpO2 95% RA  Labs: Hb 6.8, Cr 1.59, BUN 19  Interventions: Protonix ggt, 1u pRBC, 1L NS   (29 Nov 2022 17:31)    All:  Meds: Allopuriol 100, Eliquis 5mg BID, Wellbutrin 300, Coreg 6.125, Digoxin 0.125, Fenofibrate 145mg, Lasix 20 mg, Omeprazole 20, Simvastatin 20mg , Tamsulosin 0.4   PSH: S/P MVR (mitral valve repair), s/p left nephrectomy  FH:  SH:       PREVIOUS DIAGNOSTIC TESTING:      ECHO  FINDINGS: TTE  (10.21.21 @ 09:54)   1. Technically difficult study.   2. Left ventricular endocardium is not well visualized. Grossly, left ventricular function appears mildly reduced.   3. Biatrial enlargement. LVEF    4. The posterior mitral valve is mildly thickened and calcified.There is mild to moderateeccentric mitral regurgitation.   5. Normal right ventricular size and systolic function.   6. Moderate tricuspid regurgitation.   7. Pulmonary artery systolic pressure is 56 mmHg.   8. No pericardial effusion.   9. No obvious vegetations seen. However, consider RANDY to better visualize the valves and evaluate for endocarditis, if clinically indicated.    STRESS  FINDINGS: None    CATHETERIZATION  FINDINGS: None    MEDICATIONS  (STANDING):  pantoprazole Infusion 8 mG/Hr (10 mL/Hr) IV Continuous <Continuous>    MEDICATIONS  (PRN):      FAMILY HISTORY:  No pertinent family history in first degree relatives        Vital Signs Last 24 Hrs  T(C): 36.8 (29 Nov 2022 16:49), Max: 36.8 (29 Nov 2022 16:25)  T(F): 98.2 (29 Nov 2022 16:49), Max: 98.3 (29 Nov 2022 16:25)  HR: 64 (29 Nov 2022 16:49) (64 - 78)  BP: 139/79 (29 Nov 2022 16:49) (135/80 - 157/78)  RR: 18 (29 Nov 2022 16:49) (18 - 18)  SpO2: 99% (29 Nov 2022 16:49) (98% - 100%)    Parameters below as of 29 Nov 2022 16:49  Patient On (Oxygen Delivery Method): room air    PHYSICAL EXAM:    GENERAL: NAD, speaks in full sentences, no signs of respiratory distress  HEAD:  Atraumatic, Normocephalic  EYES: EOMI, PERRLA, conjunctiva and sclera clear  NECK: Supple, No JVD  CHEST/LUNG: Clear to auscultation bilaterally; No wheeze; No crackles; No accessory muscles used  HEART: Regular rate and rhythm; No murmurs;   ABDOMEN: Soft, Nontender, Nondistended; Bowel sounds present; No guarding  EXTREMITIES:  2+ Peripheral Pulses, No cyanosis or edema  PSYCH: AAOx3  NEUROLOGY: non-focal  SKIN: No rashes or lesions      INTERPRETATION OF TELEMETRY: None    ECG:    I&O's Detail      LABS:                        6.8    5.11  )-----------( 172      ( 29 Nov 2022 14:13 )             22.4     11-29    141  |  106  |  19  ----------------------------<  89  4.6   |  27  |  1.59<H>    Ca    9.2      29 Nov 2022 14:13    TPro  7.0  /  Alb  4.0  /  TBili  0.6  /  DBili  x   /  AST  16  /  ALT  10  /  AlkPhos  48  11-29        PT/INR - ( 29 Nov 2022 14:55 )   PT: 17.5 sec;   INR: 1.46          PTT - ( 29 Nov 2022 14:55 )  PTT:37.0 sec    I&O's Summary    BNP  RADIOLOGY & ADDITIONAL STUDIES: 82M, poor historian, [facility] A-Fib on Eliquis, HTN, hyperlipidemia, depression, chronic gout, depression, HTN kidney cancer s/p L nephrectomy presenting to the ED c/o a couple of episodes of lightheadedness this week. The first time was Sunday at [employer] and he felt weak causing him to fall, but did not completely pass out and denies any injuries. He also denied dizziness but endorsed lightheadedness. Today, he states he was getting ready to leave the house where he suddenly felt the same on Sundays' episode with no LOC. He called his home health aide who called EMS. Pt has been feeling generally weak in the last few days, endorsing a poor appetite but denying any recent illness or travel. Lives alone, but has an aid that comes 4x a week to help. In the ED his labs were significant forHb of 6.8 and he was admitted for symptomatic anemia and evaluation of possible GI bleed.     Of note, pt stated stated he was suppose to have a colonoscopy one month prior for dark stool - but missed appointment. He also said he was admitted previously at [facility] for a possible EGD but that was uncompleted for unknown reasons.       All: None  Meds: Allopuriol 100, Eliquis 5mg BID, Wellbutrin 300, Coreg 6.125, Digoxin 0.125, Fenofibrate 145mg, Lasix 20 mg, Omeprazole 20, Simvastatin 20mg , Tamsulosin 0.4   PSH: S/P MVR (mitral valve repair), s/p left nephrectomy  FH: Father MI at 53, Brother EtoH induced NICM  SH: Non smoker, occassional EtOh use, Marijunan on occassion.      PREVIOUS DIAGNOSTIC TESTING:      ECHO  FINDINGS: TTE  (10.21.21 @ 09:54)   1. Technically difficult study.   2. Left ventricular endocardium is not well visualized. Grossly, left ventricular function appears mildly reduced.   3. Biatrial enlargement. LVEF    4. The posterior mitral valve is mildly thickened and calcified.There is mild to moderateeccentric mitral regurgitation.   5. Normal right ventricular size and systolic function.   6. Moderate tricuspid regurgitation.   7. Pulmonary artery systolic pressure is 56 mmHg.   8. No pericardial effusion.   9. No obvious vegetations seen. However, consider RANDY to better visualize the valves and evaluate for endocarditis, if clinically indicated.    STRESS  FINDINGS: None    CATHETERIZATION  FINDINGS: None    MEDICATIONS  (STANDING):  pantoprazole Infusion 8 mG/Hr (10 mL/Hr) IV Continuous <Continuous>    MEDICATIONS  (PRN):      FAMILY HISTORY:  No pertinent family history in first degree relatives        Vital Signs Last 24 Hrs  T(C): 36.8 (29 Nov 2022 16:49), Max: 36.8 (29 Nov 2022 16:25)  T(F): 98.2 (29 Nov 2022 16:49), Max: 98.3 (29 Nov 2022 16:25)  HR: 64 (29 Nov 2022 16:49) (64 - 78)  BP: 139/79 (29 Nov 2022 16:49) (135/80 - 157/78)  RR: 18 (29 Nov 2022 16:49) (18 - 18)  SpO2: 99% (29 Nov 2022 16:49) (98% - 100%)    Parameters below as of 29 Nov 2022 16:49  Patient On (Oxygen Delivery Method): room air    PHYSICAL EXAM:    GENERAL: NAD, speaks in full sentences, no signs of respiratory distress  HEAD:  Atraumatic, Normocephalic  EYES: EOMI, PERRLA, conjunctiva and sclera clear  NECK: Supple, No JVD  CHEST/LUNG: Clear to auscultation bilaterally; No wheeze; No crackles; No accessory muscles used  HEART: Regular rate and rhythm; No murmurs;   ABDOMEN: Soft, Nontender, Nondistended; Bowel sounds present; No guarding  EXTREMITIES:  2+ Peripheral Pulses, No cyanosis or edema  PSYCH: AAOx3  NEUROLOGY: non-focal  SKIN: No rashes or lesions      INTERPRETATION OF TELEMETRY: None    ECG:    I&O's Detail      LABS:                        6.8    5.11  )-----------( 172      ( 29 Nov 2022 14:13 )             22.4     11-29    141  |  106  |  19  ----------------------------<  89  4.6   |  27  |  1.59<H>    Ca    9.2      29 Nov 2022 14:13    TPro  7.0  /  Alb  4.0  /  TBili  0.6  /  DBili  x   /  AST  16  /  ALT  10  /  AlkPhos  48  11-29        PT/INR - ( 29 Nov 2022 14:55 )   PT: 17.5 sec;   INR: 1.46          PTT - ( 29 Nov 2022 14:55 )  PTT:37.0 sec    I&O's Summary    BNP  RADIOLOGY & ADDITIONAL STUDIES: 82M, poor historian, [facility] A-Fib on Eliquis, HTN, hyperlipidemia, depression, chronic gout, depression, HTN kidney cancer s/p L nephrectomy presenting to the ED c/o a couple of episodes of lightheadedness this week. The first time was Sunday at [employer] and he felt weak causing him to fall, but did not completely pass out and denies any injuries. He also denied dizziness but endorsed lightheadedness. Today, he states he was getting ready to leave the house where he suddenly felt the same on Sundays' episode with no LOC. He called his home health aide who called EMS. Pt has been feeling generally weak in the last few days, endorsing a poor appetite but denying any recent illness or travel. Lives alone, but has an aid that comes 4x a week to help. In the ED his labs were significant forHb of 6.8 and he was admitted for symptomatic anemia and evaluation of possible GI bleed.     Of note, pt stated stated he was suppose to have a colonoscopy one month prior for dark stool - but missed appointment. He also said he was admitted previously at [facility] for a possible EGD but that was uncompleted for unknown reasons. Cardiology consulted for perioperative risk stratification. Reports that he uses a walker at home and walks up to 6 blocks without chest pain or dyspnea. Follows up at [facility] with Dr Torre.      All: None  Meds: Allopuriol 100, Eliquis 5mg BID, Wellbutrin 300, Coreg 6.125, Digoxin 0.125, Fenofibrate 145mg, Lasix 20 mg, Omeprazole 20, Simvastatin 20mg , Tamsulosin 0.4   PSH: S/P MVR (mitral valve repair), s/p left nephrectomy  FH: Father MI at 53, Brother EtoH induced NICM  SH: Non smoker, occassional EtOh use, Marijunan on occassion.      PREVIOUS DIAGNOSTIC TESTING:      ECHO  FINDINGS: TTE  (10.21.21 @ 09:54)   1. Technically difficult study.   2. Left ventricular endocardium is not well visualized. Grossly, left ventricular function appears mildly reduced.   3. Biatrial enlargement. LVEF    4. The posterior mitral valve is mildly thickened and calcified.There is mild to moderateeccentric mitral regurgitation.   5. Normal right ventricular size and systolic function.   6. Moderate tricuspid regurgitation.   7. Pulmonary artery systolic pressure is 56 mmHg.   8. No pericardial effusion.   9. No obvious vegetations seen. However, consider RANDY to better visualize the valves and evaluate for endocarditis, if clinically indicated.    STRESS  FINDINGS: None    CATHETERIZATION  FINDINGS: None    MEDICATIONS  (STANDING):  pantoprazole Infusion 8 mG/Hr (10 mL/Hr) IV Continuous <Continuous>    MEDICATIONS  (PRN):      FAMILY HISTORY:  No pertinent family history in first degree relatives        Vital Signs Last 24 Hrs  T(C): 36.8 (29 Nov 2022 16:49), Max: 36.8 (29 Nov 2022 16:25)  T(F): 98.2 (29 Nov 2022 16:49), Max: 98.3 (29 Nov 2022 16:25)  HR: 64 (29 Nov 2022 16:49) (64 - 78)  BP: 139/79 (29 Nov 2022 16:49) (135/80 - 157/78)  RR: 18 (29 Nov 2022 16:49) (18 - 18)  SpO2: 99% (29 Nov 2022 16:49) (98% - 100%)    Parameters below as of 29 Nov 2022 16:49  Patient On (Oxygen Delivery Method): room air    PHYSICAL EXAM:    GENERAL: NAD, speaks in full sentences, no signs of respiratory distress  HEAD:  Atraumatic, Normocephalic  EYES: EOMI, PERRLA, conjunctiva and sclera clear  NECK: Supple, No JVD  CHEST/LUNG: Clear to auscultation bilaterally; No wheeze; No crackles; No accessory muscles used  HEART: Regular rate and rhythm; No murmurs;   ABDOMEN: Soft, Nontender, Nondistended; Bowel sounds present; No guarding  EXTREMITIES:  2+ Peripheral Pulses, No cyanosis or edema  PSYCH: AAOx3  NEUROLOGY: non-focal  SKIN: No rashes or lesions      INTERPRETATION OF TELEMETRY: None    ECG:    I&O's Detail      LABS:                        6.8    5.11  )-----------( 172      ( 29 Nov 2022 14:13 )             22.4     11-29    141  |  106  |  19  ----------------------------<  89  4.6   |  27  |  1.59<H>    Ca    9.2      29 Nov 2022 14:13    TPro  7.0  /  Alb  4.0  /  TBili  0.6  /  DBili  x   /  AST  16  /  ALT  10  /  AlkPhos  48  11-29        PT/INR - ( 29 Nov 2022 14:55 )   PT: 17.5 sec;   INR: 1.46          PTT - ( 29 Nov 2022 14:55 )  PTT:37.0 sec    I&O's Summary    BNP  RADIOLOGY & ADDITIONAL STUDIES:

## 2022-11-29 NOTE — H&P ADULT - PROBLEM SELECTOR PLAN 7
Patient with chronic gout with previous admissions for flares. Home medication: allopurinol 50mg qd.    - Continue with home medication.

## 2022-11-29 NOTE — H&P ADULT - PROBLEM SELECTOR PLAN 4
Per patient, valve replacement occurred in 1990s, unknown. Was previously on plavix but switched to Eliquis an unknown amount of time ago.    - Hold for EGD  - Cardiac clearance. Per patient, valve replacement occurred in 1990s, unknown. Was previously on plavix but switched to Eliquis an unknown amount of time ago.    - Hold for EGD  - f/u Cardiac clearance.

## 2022-11-29 NOTE — ED PROVIDER NOTE - PHYSICAL EXAMINATION
VITAL SIGNS: I have reviewed nursing notes and confirm.  CONSTITUTIONAL: Mildly weak appearing, no focal deficits   SKIN: Agree with RN documentation regarding decubitus evaluation. Remainder of skin exam is warm and dry, no acute rash.  HEAD: Normocephalic; atraumatic.  EYES: PERRL, EOM intact; conjunctiva and sclera clear.  ENT: No nasal discharge; airway clear.  NECK: Supple; non tender.  CARD: S1, S2 normal; no murmurs, gallops, or rubs. Irregular rate and rhythm.  RESP: No wheezes, rales or rhonchi.  ABD: Normal bowel sounds; soft; non-distended; non-tender; no hepatosplenomegaly.  EXT: Normal ROM. No clubbing, cyanosis or edema. + Hematoma to R-hip , mildly tender to palpation but full ROM at hip and pelvis.  RECTAL: Blood in underwear, however, no blood noted on rectal exam.  LYMPH: No acute cervical adenopathy.  NEURO: Alert, oriented. Grossly unremarkable.  PSYCH: Cooperative, appropriate.

## 2022-11-29 NOTE — CONSULT NOTE ADULT - ASSESSMENT
82 y.o. male s/p MVR, HTN, afib on Eliquis, RCC s/p  nephrectomy admitted for presyncope in setting of acute worsening of chronic PA and melena in ED though BRBPR on outpt notes    #Presyncope  #Iron Deficiency Anemia  #Melena/BRBPR per report    Given chronicity of unexplained anemia and unclear BRBPR per outpt and melena while in ED, we will plan for EGD/[place] for comprehensive evaluation of sx.    Recommendations:  -Resuscitation transfusion per primary team  -Maintain active T&S  -Requesting cardiology clearance  -Continue PPI gtt  -Please prep with golytely  -Tentative plan for EGD/Colonoscopy tomorrow  -NPO at midnight    #Distended Abdomen: Normal liver enzymes. Non surgical abdomen, though no clear explanation for finding    Recommendations:  -Recommend imaging for further ricardo Perez DO  Gastroenterology Fellow  Pager: [phone]

## 2022-11-30 LAB
ALBUMIN SERPL ELPH-MCNC: 3.7 G/DL — SIGNIFICANT CHANGE UP (ref 3.3–5)
ALP SERPL-CCNC: 51 U/L — SIGNIFICANT CHANGE UP (ref 40–120)
ALT FLD-CCNC: 9 U/L — LOW (ref 10–45)
ANION GAP SERPL CALC-SCNC: 10 MMOL/L — SIGNIFICANT CHANGE UP (ref 5–17)
ANISOCYTOSIS BLD QL: SIGNIFICANT CHANGE UP
AST SERPL-CCNC: 18 U/L — SIGNIFICANT CHANGE UP (ref 10–40)
BASOPHILS # BLD AUTO: 0 K/UL — SIGNIFICANT CHANGE UP (ref 0–0.2)
BASOPHILS NFR BLD AUTO: 0 % — SIGNIFICANT CHANGE UP (ref 0–2)
BILIRUB SERPL-MCNC: 1 MG/DL — SIGNIFICANT CHANGE UP (ref 0.2–1.2)
BUN SERPL-MCNC: 18 MG/DL — SIGNIFICANT CHANGE UP (ref 7–23)
CALCIUM SERPL-MCNC: 9.2 MG/DL — SIGNIFICANT CHANGE UP (ref 8.4–10.5)
CHLORIDE SERPL-SCNC: 108 MMOL/L — SIGNIFICANT CHANGE UP (ref 96–108)
CO2 SERPL-SCNC: 23 MMOL/L — SIGNIFICANT CHANGE UP (ref 22–31)
CREAT SERPL-MCNC: 1.35 MG/DL — HIGH (ref 0.5–1.3)
DACRYOCYTES BLD QL SMEAR: SLIGHT — SIGNIFICANT CHANGE UP
DIGOXIN SERPL-MCNC: 0.6 NG/ML — LOW (ref 0.8–2)
EGFR: 52 ML/MIN/1.73M2 — LOW
EOSINOPHIL # BLD AUTO: 0.07 K/UL — SIGNIFICANT CHANGE UP (ref 0–0.5)
EOSINOPHIL NFR BLD AUTO: 1.8 % — SIGNIFICANT CHANGE UP (ref 0–6)
GIANT PLATELETS BLD QL SMEAR: PRESENT — SIGNIFICANT CHANGE UP
GLUCOSE SERPL-MCNC: 93 MG/DL — SIGNIFICANT CHANGE UP (ref 70–99)
HCT VFR BLD CALC: 27.7 % — LOW (ref 39–50)
HGB BLD-MCNC: 8.3 G/DL — LOW (ref 13–17)
HYPOCHROMIA BLD QL: SLIGHT — SIGNIFICANT CHANGE UP
LYMPHOCYTES # BLD AUTO: 0.28 K/UL — LOW (ref 1–3.3)
LYMPHOCYTES # BLD AUTO: 7.2 % — LOW (ref 13–44)
MAGNESIUM SERPL-MCNC: 1.9 MG/DL — SIGNIFICANT CHANGE UP (ref 1.6–2.6)
MANUAL SMEAR VERIFICATION: SIGNIFICANT CHANGE UP
MCHC RBC-ENTMCNC: 23.9 PG — LOW (ref 27–34)
MCHC RBC-ENTMCNC: 30 GM/DL — LOW (ref 32–36)
MCV RBC AUTO: 79.8 FL — LOW (ref 80–100)
MICROCYTES BLD QL: SIGNIFICANT CHANGE UP
MONOCYTES # BLD AUTO: 0.18 K/UL — SIGNIFICANT CHANGE UP (ref 0–0.9)
MONOCYTES NFR BLD AUTO: 4.5 % — SIGNIFICANT CHANGE UP (ref 2–14)
NEUTROPHILS # BLD AUTO: 3.38 K/UL — SIGNIFICANT CHANGE UP (ref 1.8–7.4)
NEUTROPHILS NFR BLD AUTO: 86.5 % — HIGH (ref 43–77)
OVALOCYTES BLD QL SMEAR: SLIGHT — SIGNIFICANT CHANGE UP
PHOSPHATE SERPL-MCNC: 3.5 MG/DL — SIGNIFICANT CHANGE UP (ref 2.5–4.5)
PLAT MORPH BLD: ABNORMAL
PLATELET # BLD AUTO: 164 K/UL — SIGNIFICANT CHANGE UP (ref 150–400)
POIKILOCYTOSIS BLD QL AUTO: SLIGHT — SIGNIFICANT CHANGE UP
POLYCHROMASIA BLD QL SMEAR: SLIGHT — SIGNIFICANT CHANGE UP
POTASSIUM SERPL-MCNC: 4.5 MMOL/L — SIGNIFICANT CHANGE UP (ref 3.5–5.3)
POTASSIUM SERPL-SCNC: 4.5 MMOL/L — SIGNIFICANT CHANGE UP (ref 3.5–5.3)
PROT SERPL-MCNC: 7.1 G/DL — SIGNIFICANT CHANGE UP (ref 6–8.3)
RBC # BLD: 3.47 M/UL — LOW (ref 4.2–5.8)
RBC # FLD: 19.3 % — HIGH (ref 10.3–14.5)
RBC BLD AUTO: ABNORMAL
SARS-COV-2 RNA SPEC QL NAA+PROBE: SIGNIFICANT CHANGE UP
SODIUM SERPL-SCNC: 141 MMOL/L — SIGNIFICANT CHANGE UP (ref 135–145)
SPHEROCYTES BLD QL SMEAR: SLIGHT — SIGNIFICANT CHANGE UP
WBC # BLD: 3.91 K/UL — SIGNIFICANT CHANGE UP (ref 3.8–10.5)
WBC # FLD AUTO: 3.91 K/UL — SIGNIFICANT CHANGE UP (ref 3.8–10.5)

## 2022-11-30 PROCEDURE — 99221 1ST HOSP IP/OBS SF/LOW 40: CPT

## 2022-11-30 PROCEDURE — 99232 SBSQ HOSP IP/OBS MODERATE 35: CPT

## 2022-11-30 PROCEDURE — 99233 SBSQ HOSP IP/OBS HIGH 50: CPT | Mod: GC

## 2022-11-30 RX ORDER — POLYETHYLENE GLYCOL 3350 17 G/17G
238 POWDER, FOR SOLUTION ORAL ONCE
Refills: 0 | Status: DISCONTINUED | OUTPATIENT
Start: 2022-11-30 | End: 2022-11-30

## 2022-11-30 RX ORDER — ALLOPURINOL 300 MG
50 TABLET ORAL DAILY
Refills: 0 | Status: DISCONTINUED | OUTPATIENT
Start: 2022-11-30 | End: 2022-12-02

## 2022-11-30 RX ORDER — POLYETHYLENE GLYCOL 3350 17 G/17G
238 POWDER, FOR SOLUTION ORAL ONCE
Refills: 0 | Status: COMPLETED | OUTPATIENT
Start: 2022-11-30 | End: 2022-11-30

## 2022-11-30 RX ADMIN — CARVEDILOL PHOSPHATE 6.25 MILLIGRAM(S): 80 CAPSULE, EXTENDED RELEASE ORAL at 19:21

## 2022-11-30 RX ADMIN — Medication 50 MILLIGRAM(S): at 13:58

## 2022-11-30 RX ADMIN — BUPROPION HYDROCHLORIDE 300 MILLIGRAM(S): 150 TABLET, EXTENDED RELEASE ORAL at 13:58

## 2022-11-30 RX ADMIN — POLYETHYLENE GLYCOL 3350 238 GRAM(S): 17 POWDER, FOR SOLUTION ORAL at 19:23

## 2022-11-30 RX ADMIN — CARVEDILOL PHOSPHATE 6.25 MILLIGRAM(S): 80 CAPSULE, EXTENDED RELEASE ORAL at 05:25

## 2022-11-30 RX ADMIN — SIMVASTATIN 10 MILLIGRAM(S): 20 TABLET, FILM COATED ORAL at 22:58

## 2022-11-30 RX ADMIN — Medication 125 MICROGRAM(S): at 05:25

## 2022-11-30 RX ADMIN — Medication 10 MILLIGRAM(S): at 19:21

## 2022-11-30 NOTE — PROGRESS NOTE ADULT - PROBLEM SELECTOR PLAN 4
Per patient, valve replacement occurred in 1990s, unknown. Was previously on plavix but switched to Eliquis an unknown amount of time ago.    - Hold for EGD  - f/u Cardiac clearance. Per patient, valve replacement occurred in 1990s, unknown. Was previously on plavix but switched to Eliquis an unknown amount of time ago.    - Hold for EGD  - f/u Cardiac clearance - ECHO 11/30 prior to scope

## 2022-11-30 NOTE — PROGRESS NOTE ADULT - SUBJECTIVE AND OBJECTIVE BOX
INTERVAL EVENTS:    PAST MEDICAL & SURGICAL HISTORY:  Depression    Hypertension    HLD (hyperlipidemia)    Afib    Cancer of kidney    Gout    S/P MVR (mitral valve repair)    H/O left nephrectomy        MEDICATIONS  (STANDING):  allopurinol 50 milliGRAM(s) Oral daily  buPROPion XL (24-Hour) . 300 milliGRAM(s) Oral daily  carvedilol 6.25 milliGRAM(s) Oral every 12 hours  digoxin     Tablet 125 MICROGram(s) Oral daily  influenza  Vaccine (HIGH DOSE) 0.7 milliLiter(s) IntraMuscular once  pantoprazole Infusion 8 mG/Hr (10 mL/Hr) IV Continuous <Continuous>  simvastatin 10 milliGRAM(s) Oral at bedtime    MEDICATIONS  (PRN):      Vital Signs Last 24 Hrs  T(C): 36.4 (30 Nov 2022 05:20), Max: 36.8 (29 Nov 2022 16:25)  T(F): 97.5 (30 Nov 2022 05:20), Max: 98.3 (29 Nov 2022 16:25)  HR: 80 (30 Nov 2022 05:20) (64 - 80)  BP: 149/84 (30 Nov 2022 05:20) (135/80 - 157/78)  BP(mean): --  RR: 17 (30 Nov 2022 05:20) (16 - 18)  SpO2: 98% (30 Nov 2022 05:20) (98% - 100%)    Parameters below as of 30 Nov 2022 05:20  Patient On (Oxygen Delivery Method): room air         PHYSICAL EXAM:  GEN: Awake, alert. NAD.   HEENT: NCAT, PERRL, EOMI. Mucosa moist. No JVD.  RESP: CTA b/l  CV: RRR. Normal S1/S2. No m/r/g.  ABD: Soft. NT/ND. BS+  EXT: Warm. No edema, clubbing, or cyanosis.   NEURO: AAOx3. No focal deficits.     LABS:                        8.3    3.91  )-----------( 164      ( 30 Nov 2022 05:30 )             27.7     11-30    141  |  108  |  18  ----------------------------<  93  4.5   |  23  |  1.35<H>    Ca    9.2      30 Nov 2022 05:30  Phos  3.5     11-30  Mg     1.9     11-30    TPro  7.1  /  Alb  3.7  /  TBili  1.0  /  DBili  x   /  AST  18  /  ALT  9<L>  /  AlkPhos  51  11-30        PT/INR - ( 29 Nov 2022 14:55 )   PT: 17.5 sec;   INR: 1.46          PTT - ( 29 Nov 2022 14:55 )  PTT:37.0 sec    I&O's Summary    BNP  RADIOLOGY & ADDITIONAL STUDIES:    TELEMETRY:    EKG:         INTERVAL EVENTS:  No acute events. Denies cp or sob.    PAST MEDICAL & SURGICAL HISTORY:  Depression  Hypertension  HLD (hyperlipidemia)  Afib  Cancer of kidney  Gout  S/P MVR (mitral valve repair)  H/O left nephrectomy    MEDICATIONS  (STANDING):  allopurinol 50 milliGRAM(s) Oral daily  buPROPion XL (24-Hour) . 300 milliGRAM(s) Oral daily  carvedilol 6.25 milliGRAM(s) Oral every 12 hours  digoxin     Tablet 125 MICROGram(s) Oral daily  influenza  Vaccine (HIGH DOSE) 0.7 milliLiter(s) IntraMuscular once  pantoprazole Infusion 8 mG/Hr (10 mL/Hr) IV Continuous <Continuous>  simvastatin 10 milliGRAM(s) Oral at bedtime    MEDICATIONS  (PRN):    Vital Signs Last 24 Hrs  T(C): 36.4 (30 Nov 2022 05:20), Max: 36.8 (29 Nov 2022 16:25)  T(F): 97.5 (30 Nov 2022 05:20), Max: 98.3 (29 Nov 2022 16:25)  HR: 80 (30 Nov 2022 05:20) (64 - 80)  BP: 149/84 (30 Nov 2022 05:20) (135/80 - 157/78)  BP(mean): --  RR: 17 (30 Nov 2022 05:20) (16 - 18)  SpO2: 98% (30 Nov 2022 05:20) (98% - 100%)    Parameters below as of 30 Nov 2022 05:20  Patient On (Oxygen Delivery Method): room air    PHYSICAL EXAM:  GEN: Awake, alert. NAD.   HEENT: NCAT, EOMI. Mucosa moist. No JVD.  RESP: CTA b/l  CV: RRR. Normal S1/S2. No m/r/g.  ABD: Soft. NT/ND. BS+  EXT: Warm. No edema   NEURO: AAOx3. No focal deficits.     LABS:                        8.3    3.91  )-----------( 164      ( 30 Nov 2022 05:30 )             27.7     11-30    141  |  108  |  18  ----------------------------<  93  4.5   |  23  |  1.35<H>    Ca    9.2      30 Nov 2022 05:30  Phos  3.5     11-30  Mg     1.9     11-30    TPro  7.1  /  Alb  3.7  /  TBili  1.0  /  DBili  x   /  AST  18  /  ALT  9<L>  /  AlkPhos  51  11-30        PT/INR - ( 29 Nov 2022 14:55 )   PT: 17.5 sec;   INR: 1.46          PTT - ( 29 Nov 2022 14:55 )  PTT:37.0 sec

## 2022-11-30 NOTE — PROGRESS NOTE ADULT - PROBLEM SELECTOR PLAN 5
Chronic issue.  - Holding AC in the setting of bleed.   - c/w digoxin 125 mcg qd  - Obtain additional collateral.

## 2022-11-30 NOTE — PROGRESS NOTE ADULT - PROBLEM SELECTOR PLAN 1
Pt admitted with Hb of 6.8, reported melena, likely 2/2 GIB and possible outpatient visit for bright red blood in rectum. s/p 1u pRBC  - GI Consulted, plan for EGD/Colonoscopy  - Per cardiology, obtain repeat echo.   - PPI ggt  - NPO after midnight  - Golytely  - Post transfusion cbc  - 2 Large bore IVs  - Transfuse if Hb <7 Pt admitted with Hb of 6.8, reported melena, likely 2/2 GIB and possible outpatient visit for bright red blood in rectum. s/p 1u pRBC  - GI Consulted, plan for EGD/Colonoscopy (delayed until 12/1)   - Per cardiology, obtain repeat echo.   - PPI ggt  - CLD, NPO after midnight  -238 g Miralax, Dulcolax 8p tonight for prep (pt already prepped with [facility] previously since anticipated procedure was 11/30)   - Post transfusion cbc  - 2 Large bore IVs  - Transfuse if Hb <7

## 2022-11-30 NOTE — PROGRESS NOTE ADULT - SUBJECTIVE AND OBJECTIVE BOX
Pt seen and examined at bedside.  NAEO. Drank approx 25% of prep. No melena, hematochezia noted per pt. Feels well otherwise. No nausea, vomiting abd pain. Wishes to eat.    Pending TTE per cardiology      Allergies    No Known Allergies    Intolerances        MEDICATIONS:  MEDICATIONS  (STANDING):  allopurinol 50 milliGRAM(s) Oral daily  bisacodyl Suppository 10 milliGRAM(s) Rectal once  buPROPion XL (24-Hour) . 300 milliGRAM(s) Oral daily  carvedilol 6.25 milliGRAM(s) Oral every 12 hours  digoxin     Tablet 125 MICROGram(s) Oral daily  influenza  Vaccine (HIGH DOSE) 0.7 milliLiter(s) IntraMuscular once  pantoprazole Infusion 8 mG/Hr (10 mL/Hr) IV Continuous <Continuous>  simvastatin 10 milliGRAM(s) Oral at bedtime    MEDICATIONS  (PRN):      Vital Signs Last 24 Hrs  T(C): 36.4 (30 Nov 2022 05:20), Max: 36.8 (29 Nov 2022 16:25)  T(F): 97.5 (30 Nov 2022 05:20), Max: 98.3 (29 Nov 2022 16:25)  HR: 80 (30 Nov 2022 05:20) (64 - 80)  BP: 149/84 (30 Nov 2022 05:20) (135/80 - 157/78)  BP(mean): --  RR: 17 (30 Nov 2022 05:20) (16 - 18)  SpO2: 98% (30 Nov 2022 05:20) (98% - 100%)    Parameters below as of 30 Nov 2022 05:20  Patient On (Oxygen Delivery Method): room air          PHYSICAL EXAM:    General: No acute distress  HEENT: MMM, conjunctiva and sclera clear  Gastrointestinal: Soft non-tender non-distended. No rebound or guarding  Skin: Warm and dry. No obvious rash    LABS:  CBC Full  -  ( 30 Nov 2022 05:30 )  WBC Count : 3.91 K/uL  RBC Count : 3.47 M/uL  Hemoglobin : 8.3 g/dL  Hematocrit : 27.7 %  Platelet Count - Automated : 164 K/uL  Mean Cell Volume : 79.8 fl  Mean Cell Hemoglobin : 23.9 pg  Mean Cell Hemoglobin Concentration : 30.0 gm/dL  Auto Neutrophil # : 3.38 K/uL  Auto Lymphocyte # : 0.28 K/uL  Auto Monocyte # : 0.18 K/uL  Auto Eosinophil # : 0.07 K/uL  Auto Basophil # : 0.00 K/uL  Auto Neutrophil % : 86.5 %  Auto Lymphocyte % : 7.2 %  Auto Monocyte % : 4.5 %  Auto Eosinophil % : 1.8 %  Auto Basophil % : 0.0 %    11-30    141  |  108  |  18  ----------------------------<  93  4.5   |  23  |  1.35<H>    Ca    9.2      30 Nov 2022 05:30  Phos  3.5     11-30  Mg     1.9     11-30    TPro  7.1  /  Alb  3.7  /  TBili  1.0  /  DBili  x   /  AST  18  /  ALT  9<L>  /  AlkPhos  51  11-30    PT/INR - ( 29 Nov 2022 14:55 )   PT: 17.5 sec;   INR: 1.46          PTT - ( 29 Nov 2022 14:55 )  PTT:37.0 sec

## 2022-11-30 NOTE — PROGRESS NOTE ADULT - ASSESSMENT
82 y.o. male s/p MVR, HTN, afib on Eliquis, RCC s/p  nephrectomy admitted for presyncope in setting of acute worsening of chronic PA and melena in ED though BRBPR on outpt notes    #Presyncope  #Iron Deficiency Anemia  #Melena/BRBPR per report    Given chronicity of unexplained anemia and unclear BRBPR per outpt and melena while in ED, we will plan for EGD/[place] for comprehensive evaluation of sx.  Hgb stable overnight. No further rectal bleeding noted    Recommendations:  -Resuscitation transfusion per primary team  -Maintain active T&S  -Continue PPI gtt  -Will cancel EGD/[place] today given incomplete prep, pending TTE; reschedule for 12/1  -Appreciate cardiology pre-procedure eval  -CLD today, dulcolax 10 mg x1 and One bottle of miralax (8.3-ounce (238 grams, 14 dose)) at 8PM this evening  -NPO at midnight      Danny Perez DO  Gastroenterology Fellow  Pager: [phone]

## 2022-11-30 NOTE — PROGRESS NOTE ADULT - ASSESSMENT
83 y/o Male, poor historian, with a PMHx of A-Fib on Eliquis, and HTN presenting with CC of lightheadness found to be symptomatically anemic with melena, admitted for workup and EDG/colonoscopy.    83 y/o Male, poor historian, with a PMHx of A-Fib on Eliquis, and HTN presenting with CC of lightheadness found to be symptomatically anemic with melena, admitted for work-up pending EGD/colonoscopy.

## 2022-11-30 NOTE — PROGRESS NOTE ADULT - ASSESSMENT
82 M poor historian, [facility] A-Fib (on Eliquis), HTN, HLD, chronic gout, depression, kidney cancer s/p L nephrectomy, MVR, presenting to the ED c/o a couple of episodes of lightheadedness. Cardiology consulted for perioperative risk stratification for EGD/Colonoscopy.      Review of Studies    ECG: Rate controlled atrial fibrillation and LBBB, with poor R wave progression    TTE 10/21/21: Grossly, left ventricular function appears mildly reduced. Biatrial enlargement. The posterior mitral valve is mildly thickened and calcified. There is mild to moderate eccentric MR. Normal RV size and systolic function. Moderate TR. PASP 56 mmHg.    #Perioperative Risk Stratification  - METS ~ 4 with mildly reduced ED, pulmonary hypertension, atrial fibrillation and mild to moderate MR, appears grossly euvolemic  on exam. On room air laying flat, CVP 3-6 with clear lungs bilaterally, noted 1+ lower extremity edema.  - Obtain non-urgent echocardiogram  - Intermediate cardiac risk for a low risk/urgent procedure.    #Atrial Fibrillation  ECG on admission with rate controlled atrial fibrillation.   CHADsVASC - 3 (HTN, Age). On Digoxin 0.125 and Coreg 6.125mg BID at home. s/p MVR  - Holding Eliquis in setting of GIB pending EGD/Colonoscopy  - Dig level: 0.6. Ok to continue home dose digoxin  - Agree with holding beta blocker if there is concern for active bleeding and hemodynamic compromise, otherwise c/w coreg 6.125mg BID (Hold HR <60, SBP <100)    #Cardiomyopathy s/p MVR  TTE in 2021 notable for bi-atrial enlargement with mildly reduced EF, mild to mod MR and PASP 56. Currently hemodynamically stable and appears grossly euvolemic on physical exam. ECG with LBBB (). On Lasix 20mg, Coreg 6.125mg at home.  - Can hold off on diuresis for now  - Follows up at [facility] with Dr Torre at [facility] - please obtain records.

## 2022-11-30 NOTE — PROGRESS NOTE ADULT - SUBJECTIVE AND OBJECTIVE BOX
Patient is a 82y old  Male who presents with a chief complaint of GIB, anemia (29 Nov 2022 17:14)      INTERVAL HPI/OVERNIGHT EVENTS:   No overnight events   Afebrile, hemodynamically stable     Subjective: Patient seen and examined at bedside.    ICU Vital Signs Last 24 Hrs  T(C): 36.4 (30 Nov 2022 05:20), Max: 36.8 (29 Nov 2022 16:25)  T(F): 97.5 (30 Nov 2022 05:20), Max: 98.3 (29 Nov 2022 16:25)  HR: 80 (30 Nov 2022 05:20) (64 - 80)  BP: 149/84 (30 Nov 2022 05:20) (135/80 - 157/78)  BP(mean): --  ABP: --  ABP(mean): --  RR: 17 (30 Nov 2022 05:20) (16 - 18)  SpO2: 98% (30 Nov 2022 05:20) (98% - 100%)    O2 Parameters below as of 30 Nov 2022 05:20  Patient On (Oxygen Delivery Method): room air          I&O's Summary        PHYSICAL EXAM:  GENERAL: No acute distress   HEAD:  Atraumatic, Normocephalic  EYES: EOMI, PERRLA, conjunctiva and sclera clear  ENMT: No tonsillar erythema, exudates, or enlargement; Moist mucous membranes  NECK: Supple, No JVD, Normal thyroid  HEART: Regular rate and rhythm; No murmurs, rubs, or gallops  RESPIRATORY: CTA B/L, No W/R/R  ABDOMEN: Soft, Nontender, Nondistended; Bowel sounds present  NEUROLOGY: A&Ox3, nonfocal, moving all extremities  EXTREMITIES:  2+ Peripheral Pulses, No clubbing, cyanosis, or edema  SKIN: warm, dry, normal color, no rash or abnormal lesions    LABS:                        8.3    3.91  )-----------( 164      ( 30 Nov 2022 05:30 )             27.7     11-30    141  |  108  |  18  ----------------------------<  93  4.5   |  23  |  1.35<H>    Ca    9.2      30 Nov 2022 05:30  Phos  3.5     11-30  Mg     1.9     11-30    TPro  7.1  /  Alb  3.7  /  TBili  1.0  /  DBili  x   /  AST  18  /  ALT  9<L>  /  AlkPhos  51  11-30    PT/INR - ( 29 Nov 2022 14:55 )   PT: 17.5 sec;   INR: 1.46          PTT - ( 29 Nov 2022 14:55 )  PTT:37.0 sec    CAPILLARY BLOOD GLUCOSE            Consultant(s) Notes Reviewed:  [x ] YES  [ ] NO    MEDICATIONS  (STANDING):  allopurinol 50 milliGRAM(s) Oral daily  buPROPion XL (24-Hour) . 300 milliGRAM(s) Oral daily  carvedilol 6.25 milliGRAM(s) Oral every 12 hours  digoxin     Tablet 125 MICROGram(s) Oral daily  influenza  Vaccine (HIGH DOSE) 0.7 milliLiter(s) IntraMuscular once  pantoprazole Infusion 8 mG/Hr (10 mL/Hr) IV Continuous <Continuous>  simvastatin 10 milliGRAM(s) Oral at bedtime    MEDICATIONS  (PRN):      Care Discussed with Consultants/Other Providers [ x] YES  [ ] NO    RADIOLOGY & ADDITIONAL TESTS: Patient is a 82y old Male who presents with a chief complaint of GIB, anemia (29 Nov 2022 17:14)      INTERVAL HPI/OVERNIGHT EVENTS:   No overnight events   Afebrile, hemodynamically stable     Subjective: Patient seen and examined at bedside. Pt. reports feeling "ok". Denies pain or lightheadedness currently but endorses weakness in his legs. Has not eaten today but reports good appetite prior. Pt. has been out of bed to the bathroom with a walker without issue. Pt. has had bowel movements. Pt. was told he had swelling but hadn't noticed any himself. Pt. reports some straining to urinate that has been present for years. Pt. denies black bowel movement or blood in stool. Pt. denies fever, chills, chest pain, GI pain, rashes, headache, and dizziness.     ICU Vital Signs Last 24 Hrs  T(C): 36.4 (30 Nov 2022 05:20), Max: 36.8 (29 Nov 2022 16:25)  T(F): 97.5 (30 Nov 2022 05:20), Max: 98.3 (29 Nov 2022 16:25)  HR: 80 (30 Nov 2022 05:20) (64 - 80)  BP: 149/84 (30 Nov 2022 05:20) (135/80 - 157/78)  BP(mean): --  ABP: --  ABP(mean): --  RR: 17 (30 Nov 2022 05:20) (16 - 18)  SpO2: 98% (30 Nov 2022 05:20) (98% - 100%)    O2 Parameters below as of 30 Nov 2022 05:20  Patient On (Oxygen Delivery Method): room air          I&O's Summary        PHYSICAL EXAM:  GENERAL: No acute distress, tired   HEAD:  Atraumatic, Normocephalic  EYES: EOMI, PERRLA, conjunctiva and sclera clear  ENMT: No tonsillar erythema, exudates, or enlargement; Moist mucous membranes  NECK: Supple, No JVD  HEART: Regular rate and rhythm; No murmurs, rubs, or gallops  RESPIRATORY: CTA B/L, No W/R/R  ABDOMEN: Soft, Nontender, Nondistended; Bowel sounds present  NEUROLOGY: A&Ox3, nonfocal, moving all extremities  EXTREMITIES:  2+ Peripheral Pulses, No clubbing, cyanosis, or edema  SKIN: warm, dry, normal color, no rash or abnormal lesions    LABS:                        8.3    3.91  )-----------( 164      ( 30 Nov 2022 05:30 )             27.7     11-30    141  |  108  |  18  ----------------------------<  93  4.5   |  23  |  1.35<H>    Ca    9.2      30 Nov 2022 05:30  Phos  3.5     11-30  Mg     1.9     11-30    TPro  7.1  /  Alb  3.7  /  TBili  1.0  /  DBili  x   /  AST  18  /  ALT  9<L>  /  AlkPhos  51  11-30    PT/INR - ( 29 Nov 2022 14:55 )   PT: 17.5 sec;   INR: 1.46          PTT - ( 29 Nov 2022 14:55 )  PTT:37.0 sec    CAPILLARY BLOOD GLUCOSE            Consultant(s) Notes Reviewed:  [x ] YES  [ ] NO    MEDICATIONS  (STANDING):  allopurinol 50 milliGRAM(s) Oral daily  buPROPion XL (24-Hour) . 300 milliGRAM(s) Oral daily  carvedilol 6.25 milliGRAM(s) Oral every 12 hours  digoxin     Tablet 125 MICROGram(s) Oral daily  influenza  Vaccine (HIGH DOSE) 0.7 milliLiter(s) IntraMuscular once  pantoprazole Infusion 8 mG/Hr (10 mL/Hr) IV Continuous <Continuous>  simvastatin 10 milliGRAM(s) Oral at bedtime    MEDICATIONS  (PRN):      Care Discussed with Consultants/Other Providers [ x] YES  [ ] NO    RADIOLOGY & ADDITIONAL TESTS: Patient is a 82y old Male who presents with a chief complaint of GIB, anemia (29 Nov 2022 17:14)      INTERVAL HPI/OVERNIGHT EVENTS:   No overnight events   Afebrile, hemodynamically stable     Subjective: Patient seen and examined at bedside. Pt. reports feeling "ok". Denies pain or lightheadedness currently but endorses weakness in his legs. Has not eaten today but reports good appetite prior. Pt. has been out of bed to the bathroom with a walker without issue. Pt. has had bowel movements. Pt. was told he had swelling but hadn't noticed any himself. Pt. reports some straining to urinate that has been present for years. Pt. endorses R shoulder and R flank pain 2-3/10, worse with movement. Pt. denies black bowel movement or blood in stool. Pt. denies fever, chills, chest pain, GI pain, rashes, headache, and dizziness.     ICU Vital Signs Last 24 Hrs  T(C): 36.4 (30 Nov 2022 05:20), Max: 36.8 (29 Nov 2022 16:25)  T(F): 97.5 (30 Nov 2022 05:20), Max: 98.3 (29 Nov 2022 16:25)  HR: 80 (30 Nov 2022 05:20) (64 - 80)  BP: 149/84 (30 Nov 2022 05:20) (135/80 - 157/78)  BP(mean): --  ABP: --  ABP(mean): --  RR: 17 (30 Nov 2022 05:20) (16 - 18)  SpO2: 98% (30 Nov 2022 05:20) (98% - 100%)    O2 Parameters below as of 30 Nov 2022 05:20  Patient On (Oxygen Delivery Method): room air          I&O's Summary        PHYSICAL EXAM:  GENERAL: No acute distress, tired   HEAD:  Atraumatic, Normocephalic  EYES: EOMI, PERRLA, anicteric sclera  ENMT: No tonsillar erythema, exudates, or enlargement; Moist mucous membranes  NECK: Supple, no stiffness, No JVD  HEART: Regular rate and rhythm; No murmurs, rubs, or gallops  RESPIRATORY: CTA B/L, No W/R/R  ABDOMEN: Soft, Nontender, distended; Normoactive bowel sounds present  NEUROLOGY: A&Ox3, CN II-XII intact, 5/5 strength in LUE, RLE, and LLE; RUE biceps brachialis muscle not assessed due to pain, otherwise 5/5  EXTREMITIES:  2+ Peripheral Pulses, 1+ pitting edema up to the thigh bilaterally  MSK: No joint pain, mild tenderness of R shoulder worse with raising beyond 90 degrees. Full range of passive and active motion  SKIN: warm, dry, normal color, ecchymosis on R flank/buttock with mild tenderness    LABS:                        8.3    3.91  )-----------( 164      ( 30 Nov 2022 05:30 )             27.7     11-30    141  |  108  |  18  ----------------------------<  93  4.5   |  23  |  1.35<H>    Ca    9.2      30 Nov 2022 05:30  Phos  3.5     11-30  Mg     1.9     11-30    TPro  7.1  /  Alb  3.7  /  TBili  1.0  /  DBili  x   /  AST  18  /  ALT  9<L>  /  AlkPhos  51  11-30    PT/INR - ( 29 Nov 2022 14:55 )   PT: 17.5 sec;   INR: 1.46          PTT - ( 29 Nov 2022 14:55 )  PTT:37.0 sec    CAPILLARY BLOOD GLUCOSE            Consultant(s) Notes Reviewed:  [x ] YES  [ ] NO    MEDICATIONS  (STANDING):  allopurinol 50 milliGRAM(s) Oral daily  buPROPion XL (24-Hour) . 300 milliGRAM(s) Oral daily  carvedilol 6.25 milliGRAM(s) Oral every 12 hours  digoxin     Tablet 125 MICROGram(s) Oral daily  influenza  Vaccine (HIGH DOSE) 0.7 milliLiter(s) IntraMuscular once  pantoprazole Infusion 8 mG/Hr (10 mL/Hr) IV Continuous <Continuous>  simvastatin 10 milliGRAM(s) Oral at bedtime    MEDICATIONS  (PRN):      Care Discussed with Consultants/Other Providers [ x] YES  [ ] NO    RADIOLOGY & ADDITIONAL TESTS: INTERVAL HPI/OVERNIGHT EVENTS:   No overnight events     Subjective: Patient seen and examined at bedside. Pt. reports feeling "ok". Denies pain or lightheadedness currently but endorses weakness in his legs. Has not eaten today but reports good appetite prior. Pt. has been out of bed to the bathroom with a walker without issue. Pt. has had bowel movements. Pt. reports some straining to urinate that has been present for years. Pt. endorses R shoulder and R flank pain 2-3/10, worse with movement. Pt. denies black bowel movement or blood in stool. Pt. denies fever, chills, chest pain, GI pain, rashes, headache, and dizziness.     ICU Vital Signs Last 24 Hrs  T(C): 36.4 (30 Nov 2022 05:20), Max: 36.8 (29 Nov 2022 16:25)  T(F): 97.5 (30 Nov 2022 05:20), Max: 98.3 (29 Nov 2022 16:25)  HR: 80 (30 Nov 2022 05:20) (64 - 80)  BP: 149/84 (30 Nov 2022 05:20) (135/80 - 157/78)  BP(mean): --  ABP: --  ABP(mean): --  RR: 17 (30 Nov 2022 05:20) (16 - 18)  SpO2: 98% (30 Nov 2022 05:20) (98% - 100%)    O2 Parameters below as of 30 Nov 2022 05:20  Patient On (Oxygen Delivery Method): room air          I&O's Summary        PHYSICAL EXAM:  GENERAL: No acute distress, tired   HEAD:  Atraumatic, Normocephalic  EYES: EOMI, PERRLA, anicteric sclera  ENMT: No tonsillar erythema, exudates, or enlargement; Moist mucous membranes  NECK: Supple, no stiffness, No JVD  HEART: Regular rate and rhythm; No murmurs, rubs, or gallops  RESPIRATORY: CTA B/L, No W/R/R  ABDOMEN: Soft, Nontender, distended; Normoactive bowel sounds present  NEUROLOGY: A&Ox3, CN II-XII intact, 5/5 strength in LUE, RLE, and LLE; RUE biceps brachialis muscle not assessed due to pain, otherwise 5/5  EXTREMITIES:  2+ Peripheral Pulses, 1+ pitting edema up to the thigh bilaterally  MSK: No joint pain, mild tenderness of R shoulder worse with raising beyond 90 degrees. Full range of passive and active motion  SKIN: warm, dry, normal color, ecchymosis on R flank/buttock with mild tenderness    LABS:                        8.3    3.91  )-----------( 164      ( 30 Nov 2022 05:30 )             27.7     11-30    141  |  108  |  18  ----------------------------<  93  4.5   |  23  |  1.35<H>    Ca    9.2      30 Nov 2022 05:30  Phos  3.5     11-30  Mg     1.9     11-30    TPro  7.1  /  Alb  3.7  /  TBili  1.0  /  DBili  x   /  AST  18  /  ALT  9<L>  /  AlkPhos  51  11-30    PT/INR - ( 29 Nov 2022 14:55 )   PT: 17.5 sec;   INR: 1.46          PTT - ( 29 Nov 2022 14:55 )  PTT:37.0 sec    CAPILLARY BLOOD GLUCOSE            Consultant(s) Notes Reviewed:  [x ] YES  [ ] NO    MEDICATIONS  (STANDING):  allopurinol 50 milliGRAM(s) Oral daily  buPROPion XL (24-Hour) . 300 milliGRAM(s) Oral daily  carvedilol 6.25 milliGRAM(s) Oral every 12 hours  digoxin     Tablet 125 MICROGram(s) Oral daily  influenza  Vaccine (HIGH DOSE) 0.7 milliLiter(s) IntraMuscular once  pantoprazole Infusion 8 mG/Hr (10 mL/Hr) IV Continuous <Continuous>  simvastatin 10 milliGRAM(s) Oral at bedtime    MEDICATIONS  (PRN):      Care Discussed with Consultants/Other Providers [ x] YES  [ ] NO    RADIOLOGY & ADDITIONAL TESTS:

## 2022-12-01 ENCOUNTER — RESULT REVIEW (OUTPATIENT)
Age: 82
End: 2022-12-01

## 2022-12-01 ENCOUNTER — TRANSCRIPTION ENCOUNTER (OUTPATIENT)
Age: 82
End: 2022-12-01

## 2022-12-01 LAB
ANION GAP SERPL CALC-SCNC: 9 MMOL/L — SIGNIFICANT CHANGE UP (ref 5–17)
BUN SERPL-MCNC: 16 MG/DL — SIGNIFICANT CHANGE UP (ref 7–23)
CALCIUM SERPL-MCNC: 9.4 MG/DL — SIGNIFICANT CHANGE UP (ref 8.4–10.5)
CHLORIDE SERPL-SCNC: 108 MMOL/L — SIGNIFICANT CHANGE UP (ref 96–108)
CO2 SERPL-SCNC: 23 MMOL/L — SIGNIFICANT CHANGE UP (ref 22–31)
CREAT SERPL-MCNC: 1.42 MG/DL — HIGH (ref 0.5–1.3)
EGFR: 49 ML/MIN/1.73M2 — LOW
GLUCOSE SERPL-MCNC: 104 MG/DL — HIGH (ref 70–99)
HCT VFR BLD CALC: 28.3 % — LOW (ref 39–50)
HGB BLD-MCNC: 8.5 G/DL — LOW (ref 13–17)
MAGNESIUM SERPL-MCNC: 2 MG/DL — SIGNIFICANT CHANGE UP (ref 1.6–2.6)
MCHC RBC-ENTMCNC: 23.8 PG — LOW (ref 27–34)
MCHC RBC-ENTMCNC: 30 GM/DL — LOW (ref 32–36)
MCV RBC AUTO: 79.3 FL — LOW (ref 80–100)
NRBC # BLD: 0 /100 WBCS — SIGNIFICANT CHANGE UP (ref 0–0)
PHOSPHATE SERPL-MCNC: 3.4 MG/DL — SIGNIFICANT CHANGE UP (ref 2.5–4.5)
PLATELET # BLD AUTO: 204 K/UL — SIGNIFICANT CHANGE UP (ref 150–400)
POTASSIUM SERPL-MCNC: 4 MMOL/L — SIGNIFICANT CHANGE UP (ref 3.5–5.3)
POTASSIUM SERPL-SCNC: 4 MMOL/L — SIGNIFICANT CHANGE UP (ref 3.5–5.3)
RBC # BLD: 3.57 M/UL — LOW (ref 4.2–5.8)
RBC # FLD: 19.1 % — HIGH (ref 10.3–14.5)
SODIUM SERPL-SCNC: 140 MMOL/L — SIGNIFICANT CHANGE UP (ref 135–145)
WBC # BLD: 5.52 K/UL — SIGNIFICANT CHANGE UP (ref 3.8–10.5)
WBC # FLD AUTO: 5.52 K/UL — SIGNIFICANT CHANGE UP (ref 3.8–10.5)

## 2022-12-01 PROCEDURE — 88305 TISSUE EXAM BY PATHOLOGIST: CPT | Mod: 26

## 2022-12-01 PROCEDURE — 43239 EGD BIOPSY SINGLE/MULTIPLE: CPT

## 2022-12-01 PROCEDURE — 99233 SBSQ HOSP IP/OBS HIGH 50: CPT | Mod: GC

## 2022-12-01 PROCEDURE — 45385 COLONOSCOPY W/LESION REMOVAL: CPT

## 2022-12-01 PROCEDURE — 45381 COLONOSCOPY SUBMUCOUS NJX: CPT

## 2022-12-01 PROCEDURE — 93306 TTE W/DOPPLER COMPLETE: CPT | Mod: 26

## 2022-12-01 RX ADMIN — CARVEDILOL PHOSPHATE 6.25 MILLIGRAM(S): 80 CAPSULE, EXTENDED RELEASE ORAL at 18:05

## 2022-12-01 RX ADMIN — BUPROPION HYDROCHLORIDE 300 MILLIGRAM(S): 150 TABLET, EXTENDED RELEASE ORAL at 12:53

## 2022-12-01 RX ADMIN — Medication 125 MICROGRAM(S): at 07:19

## 2022-12-01 RX ADMIN — SIMVASTATIN 10 MILLIGRAM(S): 20 TABLET, FILM COATED ORAL at 23:07

## 2022-12-01 RX ADMIN — CARVEDILOL PHOSPHATE 6.25 MILLIGRAM(S): 80 CAPSULE, EXTENDED RELEASE ORAL at 07:19

## 2022-12-01 RX ADMIN — Medication 50 MILLIGRAM(S): at 12:53

## 2022-12-01 NOTE — PRE-ANESTHESIA EVALUATION ADULT - NSRADCARDRESULTSFT_GEN_ALL_CORE
Reviewed  TTE 12/1/22: CONCLUSIONS:     1. Basal and mid inferior wall and basal inferoseptal segment are   abnormal.   2. Mildly reduced left ventricular systolic function.   3. Normal right ventricular size and systolic function.   4. Dilated left atrium.   5. Moderate mitral regurgitation.   6. Pulmonary artery systolic pressure is 43 mmHg.   7. No pericardial effusion.   8. Compared to the previous TTE performed on 10/21/2021, there have been   no significant interval changes.

## 2022-12-01 NOTE — PRE-ANESTHESIA EVALUATION ADULT - NSANTHPMHFT_GEN_ALL_CORE
83 y/o male with a PMHx of A-Fib on Eliquis, HLD, and HTN presenting with symptomatic anemia. Otherwise, no CP, no SOB,  no N/V/GERD. METS < 4.

## 2022-12-01 NOTE — PROGRESS NOTE ADULT - PROBLEM SELECTOR PLAN 5
Chronic issue.  - Holding AC in the setting of bleed.   - c/w digoxin 125 mcg qd  - Obtain additional collateral. Chronic issue.  - Holding AC - resume eliquis 12/3  - c/w digoxin 125 mcg qd  - Obtain additional collateral.

## 2022-12-01 NOTE — PROGRESS NOTE ADULT - PROBLEM SELECTOR PLAN 7
Patient with chronic gout with previous admissions for flares. Home medication: allopurinol 50mg qd.    - Continue with home medication.
Patient with chronic gout with previous admissions for flares. Home medication: allopurinol 50mg qd.    - Continue with home medication.

## 2022-12-01 NOTE — PROGRESS NOTE ADULT - PROBLEM SELECTOR PLAN 3
Pt home medication: Simvastatin 10mg qd    c/w home medication
Pt home medication: Simvastatin 10mg qd    c/w home medication

## 2022-12-01 NOTE — PROGRESS NOTE ADULT - ASSESSMENT
81 y/o Male, poor historian, with a PMHx of A-Fib on Eliquis, and HTN presenting with CC of lightheadness found to be symptomatically anemic with melena, admitted for work-up pending EGD/colonoscopy.    81 y/o Male, poor historian, with a PMHx of A-Fib on Eliquis, and HTN presenting with CC of lightheadness found to be symptomatically anemic with melena, admitted for work-up s/p EGD/colonoscopy with no active bleeding found, pending biopsy for H.pylori testing

## 2022-12-01 NOTE — PROGRESS NOTE ADULT - PROBLEM SELECTOR PLAN 8
Per review of records, patient did struggle with depression in the past. Home medcicatoin: Buproprion 300mg qd    - c/w home medication.
Per review of records, patient did struggle with depression in the past. Home medcicatoin: Buproprion 300mg qd    - c/w home medication.

## 2022-12-01 NOTE — PROGRESS NOTE ADULT - PROBLEM SELECTOR PLAN 9
F: pRBC.   E: Replete as necessary K>4 Mg>2  N: NPO after midnight.     DVT Prophylaxis: Holding d/t GI Bleed.   GI prophylaxis: Protonix IV   CODE STATUS: FULL  DISPO: F F: pRBC.   E: Replete as necessary K>4 Mg>2  N: NPO after midnight.     DVT Prophylaxis: Holding s/p scope   GI prophylaxis: Protonix IV   CODE STATUS: FULL  DISPO: RMF

## 2022-12-01 NOTE — PROGRESS NOTE ADULT - PROBLEM SELECTOR PLAN 4
Per patient, valve replacement occurred in 1990s, unknown. Was previously on plavix but switched to Eliquis an unknown amount of time ago.    - Hold for EGD  - f/u Cardiac clearance - ECHO 11/30 prior to scope Per patient, valve replacement occurred in 1990s, unknown. Was previously on plavix but switched to Eliquis an unknown amount of time ago.    -Can continue eliquis 12/3

## 2022-12-01 NOTE — PROGRESS NOTE ADULT - SUBJECTIVE AND OBJECTIVE BOX
INTERVAL HPI/OVERNIGHT EVENTS:   No overnight events     Subjective: Patient seen and examined at bedside. Pt. reports feeling "ok". Denies pain or lightheadedness currently but endorses weakness in his legs. Has not eaten today but reports good appetite prior. Pt. has been out of bed to the bathroom with a walker without issue. Pt. has had bowel movements. Pt. reports some straining to urinate that has been present for years. Pt. endorses R shoulder and R flank pain 2-3/10, worse with movement. Pt. denies black bowel movement or blood in stool. Pt. denies fever, chills, chest pain, GI pain, rashes, headache, and dizziness.     ICU Vital Signs Last 24 Hrs  T(C): 36.4 (30 Nov 2022 05:20), Max: 36.8 (29 Nov 2022 16:25)  T(F): 97.5 (30 Nov 2022 05:20), Max: 98.3 (29 Nov 2022 16:25)  HR: 80 (30 Nov 2022 05:20) (64 - 80)  BP: 149/84 (30 Nov 2022 05:20) (135/80 - 157/78)  BP(mean): --  ABP: --  ABP(mean): --  RR: 17 (30 Nov 2022 05:20) (16 - 18)  SpO2: 98% (30 Nov 2022 05:20) (98% - 100%)    O2 Parameters below as of 30 Nov 2022 05:20  Patient On (Oxygen Delivery Method): room air          I&O's Summary        PHYSICAL EXAM:  GENERAL: No acute distress, tired   HEAD:  Atraumatic, Normocephalic  EYES: EOMI, PERRLA, anicteric sclera  ENMT: No tonsillar erythema, exudates, or enlargement; Moist mucous membranes  NECK: Supple, no stiffness, No JVD  HEART: Regular rate and rhythm; No murmurs, rubs, or gallops  RESPIRATORY: CTA B/L, No W/R/R  ABDOMEN: Soft, Nontender, distended; Normoactive bowel sounds present  NEUROLOGY: A&Ox3, CN II-XII intact, 5/5 strength in LUE, RLE, and LLE; RUE biceps brachialis muscle not assessed due to pain, otherwise 5/5  EXTREMITIES:  2+ Peripheral Pulses, 1+ pitting edema up to the thigh bilaterally  MSK: No joint pain, mild tenderness of R shoulder worse with raising beyond 90 degrees. Full range of passive and active motion  SKIN: warm, dry, normal color, ecchymosis on R flank/buttock with mild tenderness    LABS:                        8.3    3.91  )-----------( 164      ( 30 Nov 2022 05:30 )             27.7     11-30    141  |  108  |  18  ----------------------------<  93  4.5   |  23  |  1.35<H>    Ca    9.2      30 Nov 2022 05:30  Phos  3.5     11-30  Mg     1.9     11-30    TPro  7.1  /  Alb  3.7  /  TBili  1.0  /  DBili  x   /  AST  18  /  ALT  9<L>  /  AlkPhos  51  11-30    PT/INR - ( 29 Nov 2022 14:55 )   PT: 17.5 sec;   INR: 1.46          PTT - ( 29 Nov 2022 14:55 )  PTT:37.0 sec    CAPILLARY BLOOD GLUCOSE            Consultant(s) Notes Reviewed:  [x ] YES  [ ] NO    MEDICATIONS  (STANDING):  allopurinol 50 milliGRAM(s) Oral daily  buPROPion XL (24-Hour) . 300 milliGRAM(s) Oral daily  carvedilol 6.25 milliGRAM(s) Oral every 12 hours  digoxin     Tablet 125 MICROGram(s) Oral daily  influenza  Vaccine (HIGH DOSE) 0.7 milliLiter(s) IntraMuscular once  pantoprazole Infusion 8 mG/Hr (10 mL/Hr) IV Continuous <Continuous>  simvastatin 10 milliGRAM(s) Oral at bedtime    MEDICATIONS  (PRN):      Care Discussed with Consultants/Other Providers [ x] YES  [ ] NO    RADIOLOGY & ADDITIONAL TESTS: INTERVAL HPI/OVERNIGHT EVENTS:   No overnight events     Subjective: Patient seen and examined at bedside. Pt denies any complaints currently, anticipating colonoscopy today and has had several BM s/p bowel prep. Pt. denies black bowel movement or blood in stool. Pt. denies fever, chills, chest pain, GI pain, rashes, headache, and dizziness.     ICU Vital Signs Last 24 Hrs  T(C): 36.4 (30 Nov 2022 05:20), Max: 36.8 (29 Nov 2022 16:25)  T(F): 97.5 (30 Nov 2022 05:20), Max: 98.3 (29 Nov 2022 16:25)  HR: 80 (30 Nov 2022 05:20) (64 - 80)  BP: 149/84 (30 Nov 2022 05:20) (135/80 - 157/78)  BP(mean): --  ABP: --  ABP(mean): --  RR: 17 (30 Nov 2022 05:20) (16 - 18)  SpO2: 98% (30 Nov 2022 05:20) (98% - 100%)    O2 Parameters below as of 30 Nov 2022 05:20  Patient On (Oxygen Delivery Method): room air          I&O's Summary        PHYSICAL EXAM:  GENERAL: No acute distress, tired   HEAD:  Atraumatic, Normocephalic  EYES: EOMI, PERRLA, anicteric sclera  ENMT: No tonsillar erythema, exudates, or enlargement; Moist mucous membranes  NECK: Supple, no stiffness, No JVD  HEART: Regular rate and rhythm; No murmurs, rubs, or gallops  RESPIRATORY: CTA B/L, No W/R/R  ABDOMEN: Soft, Nontender, distended; Normoactive bowel sounds present  NEUROLOGY: A&Ox3, CN II-XII intact, 5/5 strength in LUE, RLE, and LLE; RUE biceps brachialis muscle not assessed due to pain, otherwise 5/5  EXTREMITIES:  2+ Peripheral Pulses, 1+ pitting edema up to the thigh bilaterally  MSK: No joint pain, mild tenderness of R shoulder worse with raising beyond 90 degrees. Full range of passive and active motion  SKIN: warm, dry, normal color, ecchymosis on R flank/buttock with mild tenderness    LABS:                        8.3    3.91  )-----------( 164      ( 30 Nov 2022 05:30 )             27.7     11-30    141  |  108  |  18  ----------------------------<  93  4.5   |  23  |  1.35<H>    Ca    9.2      30 Nov 2022 05:30  Phos  3.5     11-30  Mg     1.9     11-30    TPro  7.1  /  Alb  3.7  /  TBili  1.0  /  DBili  x   /  AST  18  /  ALT  9<L>  /  AlkPhos  51  11-30    PT/INR - ( 29 Nov 2022 14:55 )   PT: 17.5 sec;   INR: 1.46          PTT - ( 29 Nov 2022 14:55 )  PTT:37.0 sec    CAPILLARY BLOOD GLUCOSE            Consultant(s) Notes Reviewed:  [x ] YES  [ ] NO    MEDICATIONS  (STANDING):  allopurinol 50 milliGRAM(s) Oral daily  buPROPion XL (24-Hour) . 300 milliGRAM(s) Oral daily  carvedilol 6.25 milliGRAM(s) Oral every 12 hours  digoxin     Tablet 125 MICROGram(s) Oral daily  influenza  Vaccine (HIGH DOSE) 0.7 milliLiter(s) IntraMuscular once  pantoprazole Infusion 8 mG/Hr (10 mL/Hr) IV Continuous <Continuous>  simvastatin 10 milliGRAM(s) Oral at bedtime    MEDICATIONS  (PRN):      Care Discussed with Consultants/Other Providers [ x] YES  [ ] NO    RADIOLOGY & ADDITIONAL TESTS:

## 2022-12-01 NOTE — CHART NOTE - NSCHARTNOTEFT_GEN_A_CORE
S/p EGD/ colonoscopy for GIB with myself and Dr. Hui    EGD: grossly unremarkable. biopsy to r/o h.pylori    Colonoscopy: few large polyps. Larges being 1.2cm located in cecum s/p saline lift and hot snare polypectomy.    No obvious source of bleeding found on exam.    Recommendations:   Advance diet as tolerated    Await pathology results  Resume Eliquis in 48 hours     Colonoscopy in 6 months for further polypectomy
Echo on 10/21/21 w/ mildly reduced LVEF, mild-mod MR. No need for further TTE repeat prior to colonoscopy.

## 2022-12-01 NOTE — PROGRESS NOTE ADULT - PROBLEM SELECTOR PLAN 2
Patient home medication: Carvedilol 6.25 BID  - c/w home medication
Patient home medication: Carvedilol 6.25 BID  - c/w home medication
[facility]

## 2022-12-01 NOTE — PROGRESS NOTE ADULT - PROBLEM SELECTOR PLAN 1
Pt admitted with Hb of 6.8, reported melena, likely 2/2 GIB and possible outpatient visit for bright red blood in rectum. s/p 1u pRBC  - GI Consulted, plan for EGD/Colonoscopy (delayed until 12/1)   - Per cardiology, obtain repeat echo.   - PPI ggt  - CLD, NPO after midnight  -238 g Miralax, Dulcolax 8p tonight for prep (pt already prepped with [facility] previously since anticipated procedure was 11/30)   - Post transfusion cbc  - 2 Large bore IVs  - Transfuse if Hb <7 Pt admitted with Hb of 6.8, reported melena, likely 2/2 GIB and possible outpatient visit for bright red blood in rectum. s/p 1u pRBC. EGD/colonoscopy revealed no active bleeding, polyps only   - Appreciate GI recs  -  - Per cardiology, obtain repeat echo.   - PPI ggt  - CLD, NPO after midnight  -238 g Miralax, Dulcolax 8p tonight for prep (pt already prepped with Golytey previously since anticipated procedure was 11/30)   - Post transfusion cbc  - 2 Large bore IVs  - Transfuse if Hb <7 Pt admitted with Hb of 6.8, reported melena, likely 2/2 GIB and possible outpatient visit for bright red blood in rectum. s/p 1u pRBC. EGD/colonoscopy revealed no active bleeding, polyps only   - Appreciate GI recs  -Maintain T&S  - 2 Large bore IVs  - Transfuse if Hb <7  -Continue eliquis 48 hrs s/p scope (12/3)

## 2022-12-02 ENCOUNTER — TRANSCRIPTION ENCOUNTER (OUTPATIENT)
Age: 82
End: 2022-12-02

## 2022-12-02 VITALS
SYSTOLIC BLOOD PRESSURE: 132 MMHG | TEMPERATURE: 98 F | OXYGEN SATURATION: 98 % | HEART RATE: 78 BPM | RESPIRATION RATE: 19 BRPM | DIASTOLIC BLOOD PRESSURE: 78 MMHG

## 2022-12-02 PROBLEM — Z00.00 ENCOUNTER FOR PREVENTIVE HEALTH EXAMINATION: Status: ACTIVE | Noted: 2022-12-02

## 2022-12-02 LAB
ANION GAP SERPL CALC-SCNC: 10 MMOL/L — SIGNIFICANT CHANGE UP (ref 5–17)
BLD GP AB SCN SERPL QL: NEGATIVE — SIGNIFICANT CHANGE UP
BUN SERPL-MCNC: 13 MG/DL — SIGNIFICANT CHANGE UP (ref 7–23)
CALCIUM SERPL-MCNC: 9 MG/DL — SIGNIFICANT CHANGE UP (ref 8.4–10.5)
CHLORIDE SERPL-SCNC: 103 MMOL/L — SIGNIFICANT CHANGE UP (ref 96–108)
CO2 SERPL-SCNC: 23 MMOL/L — SIGNIFICANT CHANGE UP (ref 22–31)
CREAT SERPL-MCNC: 1.37 MG/DL — HIGH (ref 0.5–1.3)
EGFR: 51 ML/MIN/1.73M2 — LOW
FERRITIN SERPL-MCNC: 29 NG/ML — LOW (ref 30–400)
GLUCOSE SERPL-MCNC: 110 MG/DL — HIGH (ref 70–99)
HCT VFR BLD CALC: 27.1 % — LOW (ref 39–50)
HGB BLD-MCNC: 8.4 G/DL — LOW (ref 13–17)
IRON SATN MFR SERPL: 10 % — LOW (ref 16–55)
IRON SATN MFR SERPL: 44 UG/DL — LOW (ref 45–165)
MAGNESIUM SERPL-MCNC: 1.8 MG/DL — SIGNIFICANT CHANGE UP (ref 1.6–2.6)
MCHC RBC-ENTMCNC: 24.3 PG — LOW (ref 27–34)
MCHC RBC-ENTMCNC: 31 GM/DL — LOW (ref 32–36)
MCV RBC AUTO: 78.3 FL — LOW (ref 80–100)
NRBC # BLD: 0 /100 WBCS — SIGNIFICANT CHANGE UP (ref 0–0)
PHOSPHATE SERPL-MCNC: 2.8 MG/DL — SIGNIFICANT CHANGE UP (ref 2.5–4.5)
PLATELET # BLD AUTO: 187 K/UL — SIGNIFICANT CHANGE UP (ref 150–400)
POTASSIUM SERPL-MCNC: 3.7 MMOL/L — SIGNIFICANT CHANGE UP (ref 3.5–5.3)
POTASSIUM SERPL-SCNC: 3.7 MMOL/L — SIGNIFICANT CHANGE UP (ref 3.5–5.3)
RBC # BLD: 3.46 M/UL — LOW (ref 4.2–5.8)
RBC # FLD: 19.7 % — HIGH (ref 10.3–14.5)
RH IG SCN BLD-IMP: POSITIVE — SIGNIFICANT CHANGE UP
SODIUM SERPL-SCNC: 136 MMOL/L — SIGNIFICANT CHANGE UP (ref 135–145)
TIBC SERPL-MCNC: 451 UG/DL — HIGH (ref 220–430)
TRANSFERRIN SERPL-MCNC: 358 MG/DL — SIGNIFICANT CHANGE UP (ref 200–360)
UIBC SERPL-MCNC: 407 UG/DL — HIGH (ref 110–370)
WBC # BLD: 5.77 K/UL — SIGNIFICANT CHANGE UP (ref 3.8–10.5)
WBC # FLD AUTO: 5.77 K/UL — SIGNIFICANT CHANGE UP (ref 3.8–10.5)

## 2022-12-02 PROCEDURE — 99239 HOSP IP/OBS DSCHRG MGMT >30: CPT

## 2022-12-02 PROCEDURE — 99233 SBSQ HOSP IP/OBS HIGH 50: CPT

## 2022-12-02 PROCEDURE — 85025 COMPLETE CBC W/AUTO DIFF WBC: CPT

## 2022-12-02 PROCEDURE — 93306 TTE W/DOPPLER COMPLETE: CPT

## 2022-12-02 PROCEDURE — 80162 ASSAY OF DIGOXIN TOTAL: CPT

## 2022-12-02 PROCEDURE — 80048 BASIC METABOLIC PNL TOTAL CA: CPT

## 2022-12-02 PROCEDURE — 85730 THROMBOPLASTIN TIME PARTIAL: CPT

## 2022-12-02 PROCEDURE — 99232 SBSQ HOSP IP/OBS MODERATE 35: CPT

## 2022-12-02 PROCEDURE — 83550 IRON BINDING TEST: CPT

## 2022-12-02 PROCEDURE — 82728 ASSAY OF FERRITIN: CPT

## 2022-12-02 PROCEDURE — 87635 SARS-COV-2 COVID-19 AMP PRB: CPT

## 2022-12-02 PROCEDURE — P9016: CPT

## 2022-12-02 PROCEDURE — 85027 COMPLETE CBC AUTOMATED: CPT

## 2022-12-02 PROCEDURE — 80053 COMPREHEN METABOLIC PANEL: CPT

## 2022-12-02 PROCEDURE — 36430 TRANSFUSION BLD/BLD COMPNT: CPT

## 2022-12-02 PROCEDURE — 99285 EMERGENCY DEPT VISIT HI MDM: CPT | Mod: 25

## 2022-12-02 PROCEDURE — U0003: CPT

## 2022-12-02 PROCEDURE — 83735 ASSAY OF MAGNESIUM: CPT

## 2022-12-02 PROCEDURE — 85610 PROTHROMBIN TIME: CPT

## 2022-12-02 PROCEDURE — 86923 COMPATIBILITY TEST ELECTRIC: CPT

## 2022-12-02 PROCEDURE — 96374 THER/PROPH/DIAG INJ IV PUSH: CPT

## 2022-12-02 PROCEDURE — 83540 ASSAY OF IRON: CPT

## 2022-12-02 PROCEDURE — 86850 RBC ANTIBODY SCREEN: CPT

## 2022-12-02 PROCEDURE — 88305 TISSUE EXAM BY PATHOLOGIST: CPT

## 2022-12-02 PROCEDURE — U0005: CPT

## 2022-12-02 PROCEDURE — 84100 ASSAY OF PHOSPHORUS: CPT

## 2022-12-02 PROCEDURE — 86901 BLOOD TYPING SEROLOGIC RH(D): CPT

## 2022-12-02 PROCEDURE — 36415 COLL VENOUS BLD VENIPUNCTURE: CPT

## 2022-12-02 PROCEDURE — 84466 ASSAY OF TRANSFERRIN: CPT

## 2022-12-02 PROCEDURE — 86900 BLOOD TYPING SEROLOGIC ABO: CPT

## 2022-12-02 RX ORDER — FERROUS SULFATE 325(65) MG
1 TABLET ORAL
Qty: 15 | Refills: 0
Start: 2022-12-02 | End: 2022-12-31

## 2022-12-02 RX ORDER — IRON SUCROSE 20 MG/ML
200 INJECTION, SOLUTION INTRAVENOUS ONCE
Refills: 0 | Status: COMPLETED | OUTPATIENT
Start: 2022-12-02 | End: 2022-12-02

## 2022-12-02 RX ORDER — POTASSIUM CHLORIDE 20 MEQ
20 PACKET (EA) ORAL
Refills: 0 | Status: COMPLETED | OUTPATIENT
Start: 2022-12-02 | End: 2022-12-02

## 2022-12-02 RX ORDER — FERROUS GLUCONATE 100 %
1 POWDER (GRAM) MISCELLANEOUS
Qty: 28 | Refills: 0
Start: 2022-12-02

## 2022-12-02 RX ADMIN — Medication 20 MILLIEQUIVALENT(S): at 13:48

## 2022-12-02 RX ADMIN — Medication 50 MILLIGRAM(S): at 11:56

## 2022-12-02 RX ADMIN — CARVEDILOL PHOSPHATE 6.25 MILLIGRAM(S): 80 CAPSULE, EXTENDED RELEASE ORAL at 06:36

## 2022-12-02 RX ADMIN — Medication 125 MICROGRAM(S): at 06:35

## 2022-12-02 RX ADMIN — Medication 20 MILLIEQUIVALENT(S): at 11:56

## 2022-12-02 RX ADMIN — BUPROPION HYDROCHLORIDE 300 MILLIGRAM(S): 150 TABLET, EXTENDED RELEASE ORAL at 11:56

## 2022-12-02 RX ADMIN — PANTOPRAZOLE SODIUM 10 MG/HR: 20 TABLET, DELAYED RELEASE ORAL at 11:55

## 2022-12-02 RX ADMIN — IRON SUCROSE 110 MILLIGRAM(S): 20 INJECTION, SOLUTION INTRAVENOUS at 13:58

## 2022-12-02 NOTE — DISCHARGE NOTE PROVIDER - CARE PROVIDER_API CALL
Kenneth Alcaraz)  Internal Medicine  [address] [place] [address]  Phone: [phone]  Fax: [phone]  Scheduled Appointment: 12/07/2022 01:45 PM   Kenneth Alcaraz)  Internal Medicine  [address], [address] [place] [address]  Phone: [phone]  Fax: [phone]  Scheduled Appointment: 12/07/2022 01:45 PM    Ulices Hui)  Internal Medicine  [address] [place] [address]  Phone: [phone]  Fax: [phone]  Scheduled Appointment: 12/15/2022 02:30 PM

## 2022-12-02 NOTE — DISCHARGE NOTE NURSING/CASE MANAGEMENT/SOCIAL WORK - PATIENT PORTAL LINK FT
You can access the FollowMyHealth Patient Portal offered by [facility] by registering at the following website: [email or web address]. By joining [employer]’s FollowMyHealth portal, you will also be able to view your health information using other applications (apps) compatible with our system.

## 2022-12-02 NOTE — PROGRESS NOTE ADULT - SUBJECTIVE AND OBJECTIVE BOX
GASTROENTEROLOGY PROGRESS NOTE  Patient seen and examined at bedside.  NAEON. Hgb stable. No further bleeding.   s/p EGD/ colo with no identifiable source of bleeding. However, underwent large polypectomy w saline lift and hot snare.     PERTINENT REVIEW OF SYSTEMS:  CONSTITUTIONAL: No weakness, fevers or chills  HEENT: No visual changes; No vertigo or throat pain   GASTROINTESTINAL: As above.  NEUROLOGICAL: No numbness or weakness  SKIN: No itching, burning, rashes, or lesions     Allergies    No Known Allergies    Intolerances      MEDICATIONS:  MEDICATIONS  (STANDING):  allopurinol 50 milliGRAM(s) Oral daily  buPROPion XL (24-Hour) . 300 milliGRAM(s) Oral daily  carvedilol 6.25 milliGRAM(s) Oral every 12 hours  digoxin     Tablet 125 MICROGram(s) Oral daily  influenza  Vaccine (HIGH DOSE) 0.7 milliLiter(s) IntraMuscular once  iron sucrose IVPB 200 milliGRAM(s) IV Intermittent once  pantoprazole Infusion 8 mG/Hr (10 mL/Hr) IV Continuous <Continuous>  potassium chloride    Tablet ER 20 milliEquivalent(s) Oral every 2 hours  simvastatin 10 milliGRAM(s) Oral at bedtime    MEDICATIONS  (PRN):    Vital Signs Last 24 Hrs  T(C): 36.4 (02 Dec 2022 05:45), Max: 36.7 (01 Dec 2022 12:40)  T(F): 97.5 (02 Dec 2022 05:45), Max: 98.1 (01 Dec 2022 20:15)  HR: 69 (02 Dec 2022 05:45) (63 - 88)  BP: 136/75 (02 Dec 2022 05:45) (110/60 - 151/83)  BP(mean): --  RR: 19 (02 Dec 2022 05:45) (18 - 19)  SpO2: 99% (02 Dec 2022 05:45) (96% - 99%)    Parameters below as of 02 Dec 2022 05:45  Patient On (Oxygen Delivery Method): room air        PHYSICAL EXAM:    General: in no acute distress  HEENT: MMM, conjunctiva and sclera clear  Gastrointestinal: Soft non-tender non-distended; No rebound or guarding  Skin: Warm and dry. No obvious rash    LABS:                        8.4    5.77  )-----------( 187      ( 02 Dec 2022 08:48 )             27.1     12-02    136  |  103  |  13  ----------------------------<  110<H>  3.7   |  23  |  1.37<H>    Ca    9.0      02 Dec 2022 08:48  Phos  2.8     12-02  Mg     1.8     12-02                        RADIOLOGY & ADDITIONAL STUDIES:  Reviewed

## 2022-12-02 NOTE — DISCHARGE NOTE PROVIDER - NSDCMRMEDTOKEN_GEN_ALL_CORE_FT
allopurinol 100 mg oral tablet: 0.5 tab(s) orally once a day   apixaban 5 mg oral tablet: 1 tab(s) orally every 12 hours  buPROPion 300 mg/24 hours (XL) oral tablet, extended release: 1 tab(s) orally every 24 hours  carvedilol 6.25 mg oral tablet: orally every 12 hours  digoxin 125 mcg (0.125 mg) oral tablet: 1 tab(s) orally once a day  fenofibrate 145 mg oral tablet: 1 tab(s) orally once a day  omeprazole 20 mg oral delayed release capsule: orally once a day, As Needed  Senna 8.6 mg oral tablet: 2 tab(s) orally 2 times a day  simvastatin 10 mg oral tablet: 1 tab(s) orally once a day (at bedtime) W &amp; F  tamsulosin 0.4 mg oral capsule: 1 cap(s) orally once a day  Vitamin D3 1250 mcg (50,000 intl units) oral capsule: 1 cap(s) orally once a week   allopurinol 100 mg oral tablet: 0.5 tab(s) orally once a day   apixaban 5 mg oral tablet: 1 tab(s) orally every 12 hours  buPROPion 300 mg/24 hours (XL) oral tablet, extended release: 1 tab(s) orally every 24 hours  carvedilol 6.25 mg oral tablet: orally every 12 hours  digoxin 125 mcg (0.125 mg) oral tablet: 1 tab(s) orally once a day  fenofibrate 145 mg oral tablet: 1 tab(s) orally once a day  [place]ousal 325 mg oral tablet: 1 tab(s) orally every other day (at bedtime)   omeprazole 20 mg oral delayed release capsule: orally once a day, As Needed  Senna 8.6 mg oral tablet: 2 tab(s) orally 2 times a day  simvastatin 10 mg oral tablet: 1 tab(s) orally once a day (at bedtime) W &amp; F  tamsulosin 0.4 mg oral capsule: 1 cap(s) orally once a day  Vitamin D3 1250 mcg (50,000 intl units) oral capsule: 1 cap(s) orally once a week

## 2022-12-02 NOTE — DISCHARGE NOTE PROVIDER - HOSPITAL COURSE
#Discharge: do not delete    81 y/o Male, poor historian, with a PMHx of A-Fib on Eliquis, and HTN presenting with CC of lightheadness found to be symptomatically anemic with melena, admitted for work-up s/p EGD/colonoscopy with no active bleeding found, pending biopsy for H.pylori testing     Inpatient treatment course:     #Symptomatic anemia.   Pt admitted with Hb of 6.8, reported melena, likely 2/2 GIB and possible outpatient visit for bright red blood in rectum. s/p 1u pRBC. EGD/colonoscopy revealed no active bleeding, polyps only   - Outpatient follow up with PCP for CBC   -Start oral iron   -Continue eliquis 48 hrs s/p scope (12/3)    # Hypertension.    Patient home medication: Carvedilol 6.25 BID  - c/w home medication.    #Hyperlipidemia.    Pt home medication: Simvastatin 10mg qd  -c/w home medication.    #Status post heart valve replacement.   Per patient, valve replacement occurred in 1990s, unknown. Was previously on plavix but switched to Eliquis an unknown amount of time ago.  -Can continue eliquis 12/3.    #Chronic atrial fibrillation.   Chronic issue.  - Holding AC s/p EGD/scope - resume eliquis 12/3  - c/w digoxin 125 mcg qd    #Gout.   Patient with chronic gout with previous admissions for flares. Home medication: allopurinol 50mg qd.  - Continue with home medication.    #Depression, major  Per review of records, patient did struggle with depression in the past. Home medication: Buproprion 300mg qd  - c/w home medication.      Problem List/Main Diagnoses:     New medications/therapies: Iron 325 mg every other day   New lines/hardware: none   Labs to be followed outpatient: CBC in one week   Exam to be followed outpatient: none     Discharge plan: discharge to assisted living facility     Physical exam on d/c:     GENERAL: No acute distress, tired   HEAD:  Atraumatic, Normocephalic  EYES: EOMI, PERRLA, anicteric sclera  ENMT: No tonsillar erythema, exudates, or enlargement; Moist mucous membranes  NECK: Supple, no stiffness, No JVD  HEART: Regular rate and rhythm; No murmurs, rubs, or gallops  RESPIRATORY: CTA B/L, No W/R/R  ABDOMEN: Soft, Nontender, distended; Normoactive bowel sounds present  NEUROLOGY: A&Ox3, CN II-XII intact, 5/5 strength in LUE, RLE, and LLE; RUE biceps brachialis muscle not assessed due to pain, otherwise 5/5  EXTREMITIES:  2+ Peripheral Pulses, 1+ pitting edema up to the thigh bilaterally  MSK: No joint pain, mild tenderness of R shoulder worse with raising beyond 90 degrees. Full range of passive and active motion  SKIN: warm, dry, normal color, ecchymosis on R flank/buttock with mild tenderness   83 y/o Male, poor historian, with a PMHx of A-Fib on Eliquis, and HTN presenting with CC of lightheadness found to be symptomatically anemic with melena, admitted for work-up s/p EGD/colonoscopy with no active bleeding found, pending biopsy for H.pylori testing     Inpatient treatment course:     #Symptomatic anemia.   Pt admitted with Hb of 6.8, reported melena, likely 2/2 GIB and possible outpatient visit for bright red blood in rectum. s/p 1u pRBC. EGD/colonoscopy revealed no active bleeding, polyps only; biopsy obtained.   - Outpatient follow up with PCP for CBC   -Start oral iron 325 mg every other day   -Continue eliquis 48 hrs s/p scope (12/3)  -F/u colonoscopy in 6 months   -F/u pathology results s/p coloscopy     # Hypertension.    Patient home medication: Carvedilol 6.25 BID  - c/w home medication.    #Hyperlipidemia.    Pt home medication: Simvastatin 10mg qd  -c/w home medication.    #Status post heart valve replacement.   Per patient, valve replacement occurred in 1990s, unknown. Was previously on plavix but switched to Eliquis an unknown amount of time ago.  -Can continue eliquis 12/3.    #Chronic atrial fibrillation.   Chronic issue.  - Holding AC s/p EGD/scope - resume eliquis 12/3  - c/w digoxin 125 mcg qd    #Gout.   Patient with chronic gout with previous admissions for flares. Home medication: allopurinol 50mg qd.  - Continue with home medication.    #Depression, major  Per review of records, patient did struggle with depression in the past. Home medication: Buproprion 300mg qd  - c/w home medication.      Problem List/Main Diagnoses:     New medications/therapies: Iron 325 mg every other day   New lines/hardware: none   Labs to be followed outpatient: GI appt, PCP appt   Exam to be followed outpatient: colonoscopy in 6 months     Discharge plan: discharge to assisted living facility     Physical exam on d/c:     GENERAL: No acute distress, tired   HEAD:  Atraumatic, Normocephalic  EYES: EOMI, PERRLA, anicteric sclera  ENMT: No tonsillar erythema, exudates, or enlargement; Moist mucous membranes  NECK: Supple, no stiffness, No JVD  HEART: Regular rate and rhythm; No murmurs, rubs, or gallops  RESPIRATORY: CTA B/L, No W/R/R  ABDOMEN: Soft, Nontender, distended; Normoactive bowel sounds present  NEUROLOGY: A&Ox3, CN II-XII intact, 5/5 strength in LUE, RLE, and LLE; RUE biceps brachialis muscle not assessed due to pain, otherwise 5/5  EXTREMITIES:  2+ Peripheral Pulses, 1+ pitting edema up to the thigh bilaterally  MSK: No joint pain, mild tenderness of R shoulder worse with raising beyond 90 degrees. Full range of passive and active motion  SKIN: warm, dry, normal color, ecchymosis on R flank/buttock with mild tenderness

## 2022-12-02 NOTE — PROGRESS NOTE ADULT - ASSESSMENT
82M w/ paroxysmal A-Fib (on Eliquis), HTN, HLD, chronic gout, depression, kidney cancer s/p L nephrectomy, MVR, presenting to the ED c/o a couple of episodes of lightheadedness in the anemia. Cardiology consulted for perioperative risk stratification for EGD/Colonoscopy.      Review of Studies    ECG: Rate controlled atrial fibrillation and LBBB, with poor R wave progression    TTE 10/21/21: Grossly, left ventricular function appears mildly reduced. Biatrial enlargement. The posterior mitral valve is mildly thickened and calcified. There is mild to moderate eccentric MR. Normal RV size and systolic function. Moderate TR. PASP 56 mmHg.    # s/p EGD/colonoscopy  - tolerated procedure well  - warm, well perfused    #Atrial Fibrillation  ECG on admission with rate controlled atrial fibrillation.   CHADsVASC - 3 (HTN, Age). On Digoxin 0.125 and Coreg 6.125mg BID at home. s/p MVR  - Agree w/ resuming Eliquis in 48 hrs post EGD  - Dig level: 0.6. Ok to continue home dose digoxin  - c/w coreg 6.125mg BID (Hold HR <60, SBP <100)    #Cardiomyopathy s/p MVR  TTE in 2021 notable for bi-atrial enlargement with mildly reduced EF, mild to mod MR and PASP 56. Currently hemodynamically stable and appears grossly euvolemic on physical exam. ECG with LBBB (). On Lasix 20mg, Coreg 6.125mg at home.  - Resume home lasix on discharge    - Follows up at [facility] with Dr Torre at [facility]

## 2022-12-02 NOTE — DISCHARGE NOTE NURSING/CASE MANAGEMENT/SOCIAL WORK - NSDCVIVACCINE_GEN_ALL_CORE_FT
influenza, high-dose, quadrivalent; 22-Oct-2021 18:30; Dariela Rae (RN); [employer]; [id number] (Exp. Date: 30-Jun-2022); IntraMuscular; Deltoid Left.; 0.7 milliLiter(s); VIS (VIS Published: 06-Aug-2021, VIS Presented: 22-Oct-2021);

## 2022-12-02 NOTE — PROGRESS NOTE ADULT - ASSESSMENT
82 y.o. male s/p MVR, HTN, afib on Eliquis, RCC s/p nephrectomy admitted for presyncope in setting of acute worsening of chronic PA and melena in ED though BRBPR on outpt notes, now s/p EGD/ colonoscopy with no identifiable source of bleeding.    #Presyncope  #Iron Deficiency Anemia  #Melena/BRBPR per report    Given chronicity of unexplained anemia and unclear BRBPR per outpt and melena while in ED, we will plan for EGD/[place] for comprehensive evaluation of sx.  During admission Hgb remained stable with no further rectal bleeding noted    12/1/22:  EGD: grossly unremarkable. biopsy to r/o h.pylori  Colonoscopy: few large polyps. Larges being 1.2cm located in cecum s/p saline lift and hot snare polypectomy.  No obvious source of bleeding found on exam.    Recommendations:  Await pathology results  Resume Eliquis in 48 hours (Sunday)     Colonoscopy in 6 months for further polypectomy.  Please schedule patient for GI follow-up with [facility] or Dr. Hui at the office located on the fourth floor of [address] [place] [address] by calling the office at [phone]     Thank you for allowing us to participate in the care of this patient.  GI will sign off. Please call back with any questions or concerns.     Josefa Raphael MD  Gastroenterology Fellow, PGY -5   Weekday Pager [phone] 82 y.o. male s/p MVR, HTN, afib on Eliquis, RCC s/p nephrectomy admitted for presyncope in setting of acute worsening of chronic PA and melena in ED though BRBPR on outpt notes, now s/p EGD/ colonoscopy with no identifiable source of bleeding.    #Presyncope  #Iron Deficiency Anemia  #Melena/BRBPR per report    Given chronicity of unexplained anemia and unclear BRBPR per outpt and melena while in ED, we will plan for EGD/[place] for comprehensive evaluation of sx.  During admission Hgb remained stable with no further rectal bleeding noted    12/1/22:  EGD: grossly unremarkable. biopsy to r/o h.pylori  Colonoscopy: few large polyps. Larges being 1.2cm located in cecum s/p saline lift and hot snare polypectomy.  No obvious source of bleeding found on exam.    Recommendations:  Await pathology results  Resume Eliquis in 48 hours (Sunday)     If patient develops rectal bleeding, consider post-polypectomy bleed. Pt instructed to return to ED/ call GI office  Colonoscopy in 6 months for further polypectomy.  Please schedule patient for GI follow-up with [facility] or Dr. Hui at the office located on the fourth floor of [address] [place] [address] by calling the office at [phone]     Thank you for allowing us to participate in the care of this patient.  GI will sign off. Please call back with any questions or concerns.     Josefa Raphael MD  Gastroenterology Fellow, PGY -5   Weekday Pager [phone]

## 2022-12-02 NOTE — DISCHARGE NOTE PROVIDER - CARE PROVIDERS DIRECT ADDRESSES
[email or web address]

## 2022-12-02 NOTE — PROGRESS NOTE ADULT - SUBJECTIVE AND OBJECTIVE BOX
INTERVAL EVENTS:    PAST MEDICAL & SURGICAL HISTORY:  Depression    Hypertension    HLD (hyperlipidemia)    Afib    Cancer of kidney    Gout    S/P MVR (mitral valve repair)    H/O left nephrectomy        MEDICATIONS  (STANDING):  allopurinol 50 milliGRAM(s) Oral daily  buPROPion XL (24-Hour) . 300 milliGRAM(s) Oral daily  carvedilol 6.25 milliGRAM(s) Oral every 12 hours  digoxin     Tablet 125 MICROGram(s) Oral daily  influenza  Vaccine (HIGH DOSE) 0.7 milliLiter(s) IntraMuscular once  iron sucrose IVPB 200 milliGRAM(s) IV Intermittent once  pantoprazole Infusion 8 mG/Hr (10 mL/Hr) IV Continuous <Continuous>  potassium chloride    Tablet ER 20 milliEquivalent(s) Oral every 2 hours  simvastatin 10 milliGRAM(s) Oral at bedtime    MEDICATIONS  (PRN):      Vital Signs Last 24 Hrs  T(C): 36.4 (02 Dec 2022 05:45), Max: 36.7 (01 Dec 2022 12:40)  T(F): 97.5 (02 Dec 2022 05:45), Max: 98.1 (01 Dec 2022 20:15)  HR: 69 (02 Dec 2022 05:45) (69 - 88)  BP: 136/75 (02 Dec 2022 05:45) (110/60 - 151/83)  BP(mean): --  RR: 19 (02 Dec 2022 05:45) (18 - 19)  SpO2: 99% (02 Dec 2022 05:45) (96% - 99%)    Parameters below as of 02 Dec 2022 05:45  Patient On (Oxygen Delivery Method): room air         PHYSICAL EXAM:  GEN: Awake, alert. NAD.   HEENT: NCAT, PERRL, EOMI. Mucosa moist. No JVD.  RESP: CTA b/l  CV: RRR. Normal S1/S2. No m/r/g.  ABD: Soft. NT/ND. BS+  EXT: Warm. No edema, clubbing, or cyanosis.   NEURO: AAOx3. No focal deficits.     LABS:                        8.4    5.77  )-----------( 187      ( 02 Dec 2022 08:48 )             27.1     12-02    136  |  103  |  13  ----------------------------<  110<H>  3.7   |  23  |  1.37<H>    Ca    9.0      02 Dec 2022 08:48  Phos  2.8     12-02  Mg     1.8     12-02              I&O's Summary    BNP  RADIOLOGY & ADDITIONAL STUDIES:    TELEMETRY:    EKG:         INTERVAL EVENTS:    PAST MEDICAL & SURGICAL HISTORY:  Depression  Hypertension  HLD (hyperlipidemia)  Afib  Cancer of kidney  Gout  S/P MVR (mitral valve repair)  H/O left nephrectomy      MEDICATIONS  (STANDING):  allopurinol 50 milliGRAM(s) Oral daily  buPROPion XL (24-Hour) . 300 milliGRAM(s) Oral daily  carvedilol 6.25 milliGRAM(s) Oral every 12 hours  digoxin     Tablet 125 MICROGram(s) Oral daily  influenza  Vaccine (HIGH DOSE) 0.7 milliLiter(s) IntraMuscular once  iron sucrose IVPB 200 milliGRAM(s) IV Intermittent once  pantoprazole Infusion 8 mG/Hr (10 mL/Hr) IV Continuous <Continuous>  potassium chloride    Tablet ER 20 milliEquivalent(s) Oral every 2 hours  simvastatin 10 milliGRAM(s) Oral at bedtime    MEDICATIONS  (PRN):    Vital Signs Last 24 Hrs  T(C): 36.4 (02 Dec 2022 05:45), Max: 36.7 (01 Dec 2022 12:40)  T(F): 97.5 (02 Dec 2022 05:45), Max: 98.1 (01 Dec 2022 20:15)  HR: 69 (02 Dec 2022 05:45) (69 - 88)  BP: 136/75 (02 Dec 2022 05:45) (110/60 - 151/83)  BP(mean): --  RR: 19 (02 Dec 2022 05:45) (18 - 19)  SpO2: 99% (02 Dec 2022 05:45) (96% - 99%)    Parameters below as of 02 Dec 2022 05:45  Patient On (Oxygen Delivery Method): room air      PHYSICAL EXAM:  GEN: Awake, alert. NAD.   HEENT: NCAT, EOMI. Mucosa moist. No JVD.  RESP: CTA b/l  CV: RRR. Normal S1/S2. No m/r/g.  ABD: Soft. NT/ND. BS+  EXT: Warm. chronic venous stasis.   NEURO: AAOx3. No focal deficits.     LABS:                        8.4    5.77  )-----------( 187      ( 02 Dec 2022 08:48 )             27.1     12-02    136  |  103  |  13  ----------------------------<  110<H>  3.7   |  23  |  1.37<H>    Ca    9.0      02 Dec 2022 08:48  Phos  2.8     12-02  Mg     1.8     12-02

## 2022-12-02 NOTE — DISCHARGE NOTE PROVIDER - NSDCCPCAREPLAN_GEN_ALL_CORE_FT
PRINCIPAL DISCHARGE DIAGNOSIS  Diagnosis: Symptomatic anemia  Assessment and Plan of Treatment: Anemia is a low number of red blood cells or a low amount of hemoglobin in your red blood cells. Hemoglobin is a protein that helps carry oxygen throughout your body. Red blood cells use iron to create hemoglobin. Anemia may develop if your body does not have enough iron. It may also develop if your body does not make enough red blood cells or they die faster than your body can make them.  Your colonoscopy showed there was no active bleeding, and your labs show you are iron deficient. Please take your oral iron supplement Ferrousal every other day.  --  It is important to get a colonoscopy in 6 months. At your GI appointment (see below), they will assist setting this up   Since you had your procedure, you are to hold your blood thinner, Eliquis until 12/3, so you may restart it tomorrow.   ---  Please follow up with your outpatient appointment to follow up on your blood counts (CBC) on 12/7 at 1:45pm with Dr. Greer at [facility]  --  Please follow-up with the stomach doctor (GI) to follow up on your biopsy results on 12/15 at 2:30pm with Dr Hui

## 2022-12-02 NOTE — PROGRESS NOTE ADULT - ATTENDING COMMENTS
Initial attending contact date   11/30/22   . See fellow note written above for details. I reviewed the fellow documentation. I have personally seen and examined this patient. I reviewed vitals, labs, medications, cardiac studies, and additional imaging. I agree with the above fellow's findings and plans as written above with the following additions/statements.    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 Cardiology consulted for perioperative risk stratification for EGD/Colonoscopy.    -pt able to perform ~ 4 METS without anginal equivalents  -EKG rate controlled A fib, LBBB  -ECHO last yr -mildly reduced EF, mild- mod MR  -euvolemic on exam  -No need for cardiac work up at this time  -Pt considered int risk for low risk procedure   -meds as noted above
Patient seen and examined with Dr. Raphael.  No further GI bleeding and patient reports no new complaint. Followup pathology and repeat colonoscopy in 6-12 months
Initial attending contact date   11/30/22   . See fellow note written above for details. I reviewed the fellow documentation. I have personally seen and examined this patient. I reviewed vitals, labs, medications, cardiac studies, and additional imaging. I agree with the above fellow's findings and plans as written above with the following additions/statements.    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 Cardiology consulted for perioperative risk stratification for EGD/Colonoscopy.    -pt able to perform ~ 4 METS without anginal equivalents  -EKG rate controlled A fib, LBBB  -ECHO last yr -mildly reduced EF, mild- mod MR  -euvolemic on exam  -now s/p egd/colo - no bleeding noted  -Resume home eliquis when able  -Pls reconsult as needed
Patient seen and examined with fellow.  Patient with incomplete bowel prep. Stable hgb and no evidence of overt GI bleeding. Will plan for EGD/colonoscopy tomorrow following completing bowel prep. Clear liquid diet today, NPO at MN.
82M w MVR, HTN, AF on Eliquis, RCC s/p NPHx p/w progressive weakness, missed EGD/COLO outpatient at [facility], found to have symptomatic anemia in setting of recent dark stools s/p 1u 11/29 - pending colo/egd    Pt ambulated to bathroom. Denies nausea, abd pain, LH/Dizziness.   Abd soft, NABS, non-tender    #Microcytic anemia - responded 8.3 from 6.8 s/p 1u  #CKD3 - baseline 1.2-1.5. Currently 1.35  #Gout – allopurinol 50 - not symptomatic  #Depression – Wellbutrin 300  #AF – coreg 6.25 bid, dig 125   - Dig level below target  #PPI – gtt  #HLD – simva 10    Plan  Overall stable - pending EGD/[place] to evaluate for GI bleeding - continues on IV PPI  Noted cardiology recs - TTE today; intermediate risk   Will need to f/u EGD/[place] results regarding timing to restart AC for AF  Mobilize patient as tolerated    outpatient - Dr. Keyon Alonso [facility]  DISPO: Home
Pt seen after TTE - hungry for food. Understands need to get EGD/[place] completed. Ambulating in room wo LH/dizziness, chest pain, dyspnea.  TTE does show slight decrease in EF 45-50%.  EGD/COLO: cecum polyp biopsied, also testing for H pylori. resume eliquis in 48h - 12/3 PM  Hgb stable 8.5 from 8.3: Iron panel w ferritin in AM  Anticipate DC to skilled nursing in AM

## 2022-12-02 NOTE — DISCHARGE NOTE NURSING/CASE MANAGEMENT/SOCIAL WORK - NSDCPEFALRISK_GEN_ALL_CORE
For information on Fall & Injury Prevention, visit: [email or web address] OR  [email or web address] OR  https://www.cdc.gov/steadi/patient.html

## 2022-12-02 NOTE — DISCHARGE NOTE PROVIDER - PROVIDER TOKENS
PROVIDER:[TOKEN:[[id number]],SCHEDULEDAPPT:[12/07/2022],SCHEDULEDAPPTTIME:[01:45 PM]] PROVIDER:[[id number]],SCHEDULEDAPPT:[12/07/2022],SCHEDULEDAPPTTIME:[01:45 PM]],PROVIDER:[TOKEN:[[id number]],SCHEDULEDAPPT:[12/15/2022],SCHEDULEDAPPTTIME:[02:30 PM]]

## 2022-12-03 RX ORDER — APIXABAN 2.5 MG/1
1 TABLET, FILM COATED ORAL
Qty: 60 | Refills: 0
Start: 2022-12-03 | End: 2023-01-01

## 2022-12-06 ENCOUNTER — NON-APPOINTMENT (OUTPATIENT)
Age: 82
End: 2022-12-06

## 2022-12-06 LAB — SURGICAL PATHOLOGY STUDY: SIGNIFICANT CHANGE UP

## 2022-12-07 ENCOUNTER — APPOINTMENT (OUTPATIENT)
Dept: INTERNAL MEDICINE | Facility: CLINIC | Age: 82
End: 2022-12-07

## 2022-12-07 ENCOUNTER — NON-APPOINTMENT (OUTPATIENT)
Age: 82
End: 2022-12-07

## 2022-12-07 DIAGNOSIS — K92.1 MELENA: ICD-10-CM

## 2022-12-07 DIAGNOSIS — D12.0 BENIGN NEOPLASM OF CECUM: ICD-10-CM

## 2022-12-07 DIAGNOSIS — D64.9 ANEMIA, UNSPECIFIED: ICD-10-CM

## 2022-12-07 DIAGNOSIS — Z85.528 PERSONAL HISTORY OF OTHER MALIGNANT NEOPLASM OF KIDNEY: ICD-10-CM

## 2022-12-07 DIAGNOSIS — F32.9 MAJOR DEPRESSIVE DISORDER, SINGLE EPISODE, UNSPECIFIED: ICD-10-CM

## 2022-12-07 DIAGNOSIS — I44.7 LEFT BUNDLE-BRANCH BLOCK, UNSPECIFIED: ICD-10-CM

## 2022-12-07 DIAGNOSIS — M1A.9XX0 CHRONIC GOUT, UNSPECIFIED, WITHOUT TOPHUS (TOPHI): ICD-10-CM

## 2022-12-07 DIAGNOSIS — Z79.01 LONG TERM (CURRENT) USE OF ANTICOAGULANTS: ICD-10-CM

## 2022-12-07 DIAGNOSIS — Z90.5 ACQUIRED ABSENCE OF KIDNEY: ICD-10-CM

## 2022-12-07 DIAGNOSIS — I12.9 HYPERTENSIVE CHRONIC KIDNEY DISEASE WITH STAGE 1 THROUGH STAGE 4 CHRONIC KIDNEY DISEASE, OR UNSPECIFIED CHRONIC KIDNEY DISEASE: ICD-10-CM

## 2022-12-07 DIAGNOSIS — D50.9 IRON DEFICIENCY ANEMIA, UNSPECIFIED: ICD-10-CM

## 2022-12-07 DIAGNOSIS — Z95.2 PRESENCE OF PROSTHETIC HEART VALVE: ICD-10-CM

## 2022-12-07 DIAGNOSIS — E78.5 HYPERLIPIDEMIA, UNSPECIFIED: ICD-10-CM

## 2022-12-07 DIAGNOSIS — I48.20 CHRONIC ATRIAL FIBRILLATION, UNSPECIFIED: ICD-10-CM

## 2022-12-07 DIAGNOSIS — N18.30 CHRONIC KIDNEY DISEASE, STAGE 3 UNSPECIFIED: ICD-10-CM

## 2022-12-07 DIAGNOSIS — I42.9 CARDIOMYOPATHY, UNSPECIFIED: ICD-10-CM

## 2022-12-07 DIAGNOSIS — Z20.822 CONTACT WITH AND (SUSPECTED) EXPOSURE TO COVID-19: ICD-10-CM

## 2022-12-15 ENCOUNTER — APPOINTMENT (OUTPATIENT)
Dept: GASTROENTEROLOGY | Facility: CLINIC | Age: 82
End: 2022-12-15

## 2023-03-28 ENCOUNTER — INPATIENT (INPATIENT)
Facility: HOSPITAL | Age: 83
LOS: 2 days | Discharge: HOME HEALTH | DRG: 918 | End: 2023-03-31
Attending: STUDENT IN AN ORGANIZED HEALTH CARE EDUCATION/TRAINING PROGRAM | Admitting: STUDENT IN AN ORGANIZED HEALTH CARE EDUCATION/TRAINING PROGRAM
Payer: MEDICARE

## 2023-03-28 VITALS
SYSTOLIC BLOOD PRESSURE: 126 MMHG | DIASTOLIC BLOOD PRESSURE: 68 MMHG | OXYGEN SATURATION: 99 % | HEART RATE: 86 BPM | WEIGHT: 179.9 LBS | HEIGHT: 68 IN | TEMPERATURE: 99 F | RESPIRATION RATE: 18 BRPM

## 2023-03-28 DIAGNOSIS — K59.09 OTHER CONSTIPATION: ICD-10-CM

## 2023-03-28 DIAGNOSIS — Z90.5 ACQUIRED ABSENCE OF KIDNEY: Chronic | ICD-10-CM

## 2023-03-28 DIAGNOSIS — K21.9 GASTRO-ESOPHAGEAL REFLUX DISEASE WITHOUT ESOPHAGITIS: ICD-10-CM

## 2023-03-28 DIAGNOSIS — R31.9 HEMATURIA, UNSPECIFIED: ICD-10-CM

## 2023-03-28 DIAGNOSIS — I48.20 CHRONIC ATRIAL FIBRILLATION, UNSPECIFIED: ICD-10-CM

## 2023-03-28 DIAGNOSIS — F41.8 OTHER SPECIFIED ANXIETY DISORDERS: ICD-10-CM

## 2023-03-28 DIAGNOSIS — Z98.890 OTHER SPECIFIED POSTPROCEDURAL STATES: Chronic | ICD-10-CM

## 2023-03-28 LAB
ALBUMIN SERPL ELPH-MCNC: 3.8 G/DL — SIGNIFICANT CHANGE UP (ref 3.3–5)
ALP SERPL-CCNC: 59 U/L — SIGNIFICANT CHANGE UP (ref 40–120)
ALT FLD-CCNC: 11 U/L — SIGNIFICANT CHANGE UP (ref 10–45)
ANION GAP SERPL CALC-SCNC: 8 MMOL/L — SIGNIFICANT CHANGE UP (ref 5–17)
APPEARANCE UR: ABNORMAL
AST SERPL-CCNC: 21 U/L — SIGNIFICANT CHANGE UP (ref 10–40)
BACTERIA # UR AUTO: PRESENT /HPF
BASOPHILS # BLD AUTO: 0.03 K/UL — SIGNIFICANT CHANGE UP (ref 0–0.2)
BASOPHILS # BLD AUTO: 0.05 K/UL — SIGNIFICANT CHANGE UP (ref 0–0.2)
BASOPHILS NFR BLD AUTO: 0.6 % — SIGNIFICANT CHANGE UP (ref 0–2)
BASOPHILS NFR BLD AUTO: 1 % — SIGNIFICANT CHANGE UP (ref 0–2)
BILIRUB SERPL-MCNC: 0.5 MG/DL — SIGNIFICANT CHANGE UP (ref 0.2–1.2)
BILIRUB UR-MCNC: ABNORMAL
BUN SERPL-MCNC: 25 MG/DL — HIGH (ref 7–23)
CALCIUM SERPL-MCNC: 9.2 MG/DL — SIGNIFICANT CHANGE UP (ref 8.4–10.5)
CHLORIDE SERPL-SCNC: 108 MMOL/L — SIGNIFICANT CHANGE UP (ref 96–108)
CO2 SERPL-SCNC: 27 MMOL/L — SIGNIFICANT CHANGE UP (ref 22–31)
COLOR SPEC: ABNORMAL
CREAT SERPL-MCNC: 1.55 MG/DL — HIGH (ref 0.5–1.3)
DIFF PNL FLD: ABNORMAL
EGFR: 44 ML/MIN/1.73M2 — LOW
EOSINOPHIL # BLD AUTO: 0.29 K/UL — SIGNIFICANT CHANGE UP (ref 0–0.5)
EOSINOPHIL # BLD AUTO: 0.35 K/UL — SIGNIFICANT CHANGE UP (ref 0–0.5)
EOSINOPHIL NFR BLD AUTO: 5.8 % — SIGNIFICANT CHANGE UP (ref 0–6)
EOSINOPHIL NFR BLD AUTO: 6.7 % — HIGH (ref 0–6)
GLUCOSE SERPL-MCNC: 115 MG/DL — HIGH (ref 70–99)
GLUCOSE UR QL: 100
HCT VFR BLD CALC: 28.7 % — LOW (ref 39–50)
HCT VFR BLD CALC: 29.5 % — LOW (ref 39–50)
HGB BLD-MCNC: 9 G/DL — LOW (ref 13–17)
HGB BLD-MCNC: 9.1 G/DL — LOW (ref 13–17)
IMM GRANULOCYTES NFR BLD AUTO: 0.4 % — SIGNIFICANT CHANGE UP (ref 0–0.9)
IMM GRANULOCYTES NFR BLD AUTO: 0.4 % — SIGNIFICANT CHANGE UP (ref 0–0.9)
KETONES UR-MCNC: NEGATIVE — SIGNIFICANT CHANGE UP
LEUKOCYTE ESTERASE UR-ACNC: NEGATIVE — SIGNIFICANT CHANGE UP
LYMPHOCYTES # BLD AUTO: 0.65 K/UL — LOW (ref 1–3.3)
LYMPHOCYTES # BLD AUTO: 0.8 K/UL — LOW (ref 1–3.3)
LYMPHOCYTES # BLD AUTO: 13.1 % — SIGNIFICANT CHANGE UP (ref 13–44)
LYMPHOCYTES # BLD AUTO: 15.2 % — SIGNIFICANT CHANGE UP (ref 13–44)
MCHC RBC-ENTMCNC: 25.2 PG — LOW (ref 27–34)
MCHC RBC-ENTMCNC: 25.5 PG — LOW (ref 27–34)
MCHC RBC-ENTMCNC: 30.8 GM/DL — LOW (ref 32–36)
MCHC RBC-ENTMCNC: 31.4 GM/DL — LOW (ref 32–36)
MCV RBC AUTO: 81.3 FL — SIGNIFICANT CHANGE UP (ref 80–100)
MCV RBC AUTO: 81.7 FL — SIGNIFICANT CHANGE UP (ref 80–100)
MONOCYTES # BLD AUTO: 0.41 K/UL — SIGNIFICANT CHANGE UP (ref 0–0.9)
MONOCYTES # BLD AUTO: 0.42 K/UL — SIGNIFICANT CHANGE UP (ref 0–0.9)
MONOCYTES NFR BLD AUTO: 7.8 % — SIGNIFICANT CHANGE UP (ref 2–14)
MONOCYTES NFR BLD AUTO: 8.5 % — SIGNIFICANT CHANGE UP (ref 2–14)
NEUTROPHILS # BLD AUTO: 3.56 K/UL — SIGNIFICANT CHANGE UP (ref 1.8–7.4)
NEUTROPHILS # BLD AUTO: 3.63 K/UL — SIGNIFICANT CHANGE UP (ref 1.8–7.4)
NEUTROPHILS NFR BLD AUTO: 68.9 % — SIGNIFICANT CHANGE UP (ref 43–77)
NEUTROPHILS NFR BLD AUTO: 71.6 % — SIGNIFICANT CHANGE UP (ref 43–77)
NITRITE UR-MCNC: POSITIVE
NRBC # BLD: 0 /100 WBCS — SIGNIFICANT CHANGE UP (ref 0–0)
NRBC # BLD: 0 /100 WBCS — SIGNIFICANT CHANGE UP (ref 0–0)
PH UR: 7 — SIGNIFICANT CHANGE UP (ref 5–8)
PLATELET # BLD AUTO: 174 K/UL — SIGNIFICANT CHANGE UP (ref 150–400)
PLATELET # BLD AUTO: 178 K/UL — SIGNIFICANT CHANGE UP (ref 150–400)
POTASSIUM SERPL-MCNC: 4.8 MMOL/L — SIGNIFICANT CHANGE UP (ref 3.5–5.3)
POTASSIUM SERPL-SCNC: 4.8 MMOL/L — SIGNIFICANT CHANGE UP (ref 3.5–5.3)
PROT SERPL-MCNC: 7.1 G/DL — SIGNIFICANT CHANGE UP (ref 6–8.3)
PROT UR-MCNC: >=300 MG/DL
RBC # BLD: 3.53 M/UL — LOW (ref 4.2–5.8)
RBC # BLD: 3.61 M/UL — LOW (ref 4.2–5.8)
RBC # FLD: 17.9 % — HIGH (ref 10.3–14.5)
RBC # FLD: 18 % — HIGH (ref 10.3–14.5)
RBC CASTS # UR COMP ASSIST: ABNORMAL /HPF
SARS-COV-2 RNA SPEC QL NAA+PROBE: SIGNIFICANT CHANGE UP
SODIUM SERPL-SCNC: 143 MMOL/L — SIGNIFICANT CHANGE UP (ref 135–145)
SP GR SPEC: 1.02 — SIGNIFICANT CHANGE UP (ref 1–1.03)
UROBILINOGEN FLD QL: 0.2 E.U./DL — SIGNIFICANT CHANGE UP
WBC # BLD: 4.97 K/UL — SIGNIFICANT CHANGE UP (ref 3.8–10.5)
WBC # BLD: 5.26 K/UL — SIGNIFICANT CHANGE UP (ref 3.8–10.5)
WBC # FLD AUTO: 4.97 K/UL — SIGNIFICANT CHANGE UP (ref 3.8–10.5)
WBC # FLD AUTO: 5.26 K/UL — SIGNIFICANT CHANGE UP (ref 3.8–10.5)
WBC UR QL: < 5 /HPF — SIGNIFICANT CHANGE UP

## 2023-03-28 PROCEDURE — 99285 EMERGENCY DEPT VISIT HI MDM: CPT | Mod: FS

## 2023-03-28 PROCEDURE — 99223 1ST HOSP IP/OBS HIGH 75: CPT

## 2023-03-28 PROCEDURE — 74176 CT ABD & PELVIS W/O CONTRAST: CPT | Mod: 26,MA

## 2023-03-28 RX ORDER — CEFTRIAXONE 500 MG/1
1000 INJECTION, POWDER, FOR SOLUTION INTRAMUSCULAR; INTRAVENOUS ONCE
Refills: 0 | Status: COMPLETED | OUTPATIENT
Start: 2023-03-28 | End: 2023-03-28

## 2023-03-28 RX ORDER — BUPROPION HYDROCHLORIDE 150 MG/1
300 TABLET, EXTENDED RELEASE ORAL DAILY
Refills: 0 | Status: DISCONTINUED | OUTPATIENT
Start: 2023-03-29 | End: 2023-03-31

## 2023-03-28 RX ORDER — SENNA PLUS 8.6 MG/1
2 TABLET ORAL AT BEDTIME
Refills: 0 | Status: DISCONTINUED | OUTPATIENT
Start: 2023-03-28 | End: 2023-03-31

## 2023-03-28 RX ORDER — ONDANSETRON 8 MG/1
4 TABLET, FILM COATED ORAL EVERY 6 HOURS
Refills: 0 | Status: DISCONTINUED | OUTPATIENT
Start: 2023-03-28 | End: 2023-03-31

## 2023-03-28 RX ORDER — CEFTRIAXONE 500 MG/1
1000 INJECTION, POWDER, FOR SOLUTION INTRAMUSCULAR; INTRAVENOUS EVERY 24 HOURS
Refills: 0 | Status: DISCONTINUED | OUTPATIENT
Start: 2023-03-29 | End: 2023-03-31

## 2023-03-28 RX ORDER — SODIUM CHLORIDE 9 MG/ML
1000 INJECTION INTRAMUSCULAR; INTRAVENOUS; SUBCUTANEOUS
Refills: 0 | Status: DISCONTINUED | OUTPATIENT
Start: 2023-03-28 | End: 2023-03-29

## 2023-03-28 RX ORDER — SODIUM CHLORIDE 9 MG/ML
500 INJECTION INTRAMUSCULAR; INTRAVENOUS; SUBCUTANEOUS ONCE
Refills: 0 | Status: COMPLETED | OUTPATIENT
Start: 2023-03-28 | End: 2023-03-28

## 2023-03-28 RX ORDER — TAMSULOSIN HYDROCHLORIDE 0.4 MG/1
0.4 CAPSULE ORAL AT BEDTIME
Refills: 0 | Status: DISCONTINUED | OUTPATIENT
Start: 2023-03-28 | End: 2023-03-31

## 2023-03-28 RX ORDER — SIMVASTATIN 20 MG/1
10 TABLET, FILM COATED ORAL AT BEDTIME
Refills: 0 | Status: DISCONTINUED | OUTPATIENT
Start: 2023-03-28 | End: 2023-03-31

## 2023-03-28 RX ORDER — POLYETHYLENE GLYCOL 3350 17 G/17G
17 POWDER, FOR SOLUTION ORAL DAILY
Refills: 0 | Status: COMPLETED | OUTPATIENT
Start: 2023-03-28 | End: 2023-03-31

## 2023-03-28 RX ORDER — CARVEDILOL PHOSPHATE 80 MG/1
6.25 CAPSULE, EXTENDED RELEASE ORAL EVERY 12 HOURS
Refills: 0 | Status: DISCONTINUED | OUTPATIENT
Start: 2023-03-28 | End: 2023-03-29

## 2023-03-28 RX ORDER — ALLOPURINOL 300 MG
100 TABLET ORAL DAILY
Refills: 0 | Status: DISCONTINUED | OUTPATIENT
Start: 2023-03-29 | End: 2023-03-31

## 2023-03-28 RX ORDER — ACETAMINOPHEN 500 MG
650 TABLET ORAL EVERY 6 HOURS
Refills: 0 | Status: DISCONTINUED | OUTPATIENT
Start: 2023-03-28 | End: 2023-03-31

## 2023-03-28 RX ORDER — DIGOXIN 250 MCG
125 TABLET ORAL DAILY
Refills: 0 | Status: DISCONTINUED | OUTPATIENT
Start: 2023-03-29 | End: 2023-03-29

## 2023-03-28 RX ADMIN — CEFTRIAXONE 100 MILLIGRAM(S): 500 INJECTION, POWDER, FOR SOLUTION INTRAMUSCULAR; INTRAVENOUS at 17:26

## 2023-03-28 RX ADMIN — TAMSULOSIN HYDROCHLORIDE 0.4 MILLIGRAM(S): 0.4 CAPSULE ORAL at 22:24

## 2023-03-28 RX ADMIN — SENNA PLUS 2 TABLET(S): 8.6 TABLET ORAL at 22:24

## 2023-03-28 RX ADMIN — SIMVASTATIN 10 MILLIGRAM(S): 20 TABLET, FILM COATED ORAL at 22:24

## 2023-03-28 RX ADMIN — SODIUM CHLORIDE 500 MILLILITER(S): 9 INJECTION INTRAMUSCULAR; INTRAVENOUS; SUBCUTANEOUS at 21:54

## 2023-03-28 RX ADMIN — SODIUM CHLORIDE 500 MILLILITER(S): 9 INJECTION INTRAMUSCULAR; INTRAVENOUS; SUBCUTANEOUS at 20:57

## 2023-03-28 RX ADMIN — CARVEDILOL PHOSPHATE 6.25 MILLIGRAM(S): 80 CAPSULE, EXTENDED RELEASE ORAL at 22:24

## 2023-03-28 RX ADMIN — CEFTRIAXONE 1000 MILLIGRAM(S): 500 INJECTION, POWDER, FOR SOLUTION INTRAMUSCULAR; INTRAVENOUS at 19:42

## 2023-03-28 NOTE — ED PROVIDER NOTE - OBJECTIVE STATEMENT
81 y/o male w/ hx A-Fib on Eliquis, HTN, hyperlipidemia, chronic gout, depression, kidney cancer s/p L nephrectomy p/w 2 episodes of gross hematuria since this afternoon.  Notes felt like he had to try harder to urinate today, otherwise feels well.  Denies dizziness, f/c, cp, sob, abd pain, n/v/d, dysuria, flank/ back pain.

## 2023-03-28 NOTE — ED PROVIDER NOTE - NS ED ROS FT
CONSTITUTIONAL: Denies fever and chills    RESPIRATORY: Denies SOB    CARDIOVASCULAR: Denies chest pain.    GASTROINTESTINAL: Denies abdominal pain, nausea, vomiting and diarrhea    GENITOURINARY: +hematuria.  Denies dysuria    MUSCULOSKELETAL: Denies flank pain

## 2023-03-28 NOTE — CONSULT NOTE ADULT - PROBLEM SELECTOR RECOMMENDATION 6
Home med: omeprazole 20mg PO qd    Recommend:  - Cont therapeutic interchange pantoprazole 40mg PO qd

## 2023-03-28 NOTE — ED PROVIDER NOTE - ATTENDING APP SHARED VISIT CONTRIBUTION OF CARE
82-year-old male with history of A-fib on Eliquis presenting with solitario hematuria x1 day.  No fevers or chills no flank pain.  Patient has solitario blood on urinalysis.  Will obtain labs urine testing and consult urology for consideration of continuous bladder irrigation.

## 2023-03-28 NOTE — H&P ADULT - HISTORY OF PRESENT ILLNESS
81 y/o male w/ hx A-Fib on Eliquis, HTN, hyperlipidemia, chronic gout, depression, kidney cancer s/p L nephrectomy p/w 2 episodes of gross hematuria since this afternoon.  Notes felt like he had to try harder to urinate today, otherwise feels well.  Denies dizziness, f/c, cp, sob, abd pain, n/v/d, dysuria, flank/ back pain.     Note: As discussed above. Patient reports that he believes he took too much Eliquis this morning and believes he accidentally doubled his dose. He reports to onset of hematuria shortly after with no associated dysuria or pain. He reports to no previous bouts of hematuria. His L nephrectomy occurred in [place] in 2008 and he has not followed up with  since. Clots appreciated in urinal bedside. Patient irrigated bedside with 22 6eye catheter with minimal clot expulsion and clearing of urine to pink clear. 18fr coude placed using sterile method for further urine monitoring and PRN irrigation. Tolerated procedure well.  Shortly after irrigation urine became more dark pink/light punch

## 2023-03-28 NOTE — ED PROVIDER NOTE - PHYSICAL EXAMINATION
CONSTITUTIONAL: Awake, alert.  Nontoxic, no acute distress.    HEAD: Normocephalic, atraumatic.    EYES: Conjunctivae clear without exudates or hemorrhage. Sclera is non-icteric.    ENT: Normal appearing external ears, nose, mucous membranes moist.    NECK: supple, trachea midline.    HEART:  Normal rate, regular rhythm.  Heart sounds S1, S2.  No murmurs, rubs or gallops.    LUNGS:  No acute respiratory distress.  Non-tachypneic and non-labored.  Lungs are clear bilaterally with good aeration.  No wheezing, rales, rhonchi.    ABDOMEN: Normal appearing skin without lesions, rashes.  Normal bowel sounds x 4.  Soft, non-distended, non-tender in all four quadrants. No rebound or guarding. No hernias or masses palpable.  No pulsatile abdominal mass.   No CVA tenderness b/l.    GENITAL: Normal appearing genitalia. Uncircumcised. No penile discharge or lesions.    MUSCULOSKELETAL:  Moving all extremities without issue.

## 2023-03-28 NOTE — ED ADULT NURSE NOTE - CHIEF COMPLAINT QUOTE
Patient coming from home with hematuria. Hx of 1 kidney, Afib (on Eliquis) and previous hx of open heart surgery x 1.5 hours ago. Denies dizziness or pain.

## 2023-03-28 NOTE — CONSULT NOTE ADULT - ATTENDING COMMENTS
#hematuria: mngment per urology, c/w irrigation prn, active TS, f/up coags, trend cbc    #Afib: holding elquis for now, f/up coags, EKG, monitor HR. Restart digoxin pending dig level.

## 2023-03-28 NOTE — ED PROVIDER NOTE - PROGRESS NOTE DETAILS
called. Will fill bladder and attempt TOV to determine if patient can go home. Patient otherwise stable.

## 2023-03-28 NOTE — CONSULT NOTE ADULT - PROBLEM SELECTOR RECOMMENDATION 3
Hgb 9 on admission, appears to be baseline from previous admission. Iron studies consistent with PA at that time    Recommend:  - Monitor Hgb   - Maintain active T & S  - Transfuse goal Hgb <7 Hgb 9 on admission, appears to be baseline from previous admission. Iron studies consistent with PA at that time    Recommend:  - F/U Hgb in AM  - Maintain active T & S  - Transfuse goal Hgb <7

## 2023-03-28 NOTE — H&P ADULT - NSHPLABSRESULTS_GEN_ALL_CORE
VITALS:    T(F): 98.4 (23 @ 21:28), Max: 98.6 (23 @ 15:33)  HR: 72 (23 @ 21:28) (61 - 86)  BP: 130/75 (23 @ 21:28) (126/68 - 134/80)  RR: 16 (23 @ 21:28) (16 - 18)  SpO2: 99% (23 @ 21:28) (99% - 99%)  Wt(kg): --      LABS:                        9.1    5.26  )-----------( 178      ( 28 Mar 2023 19:48 )             29.5         143  |  108  |  25<H>  ----------------------------<  115<H>  4.8   |  27  |  1.55<H>    Ca    9.2      28 Mar 2023 16:42    TPro  7.1  /  Alb  3.8  /  TBili  0.5  /  DBili  x   /  AST  21  /  ALT  11  /  AlkPhos  59        Urinalysis Basic - ( 28 Mar 2023 16:42 )    Color: Red / Appearance: Turbid / S.020 / pH: x  Gluc: x / Ketone: NEGATIVE  / Bili: Small / Urobili: 0.2 E.U./dL   Blood: x / Protein: >=300 mg/dL / Nitrite: POSITIVE   Leuk Esterase: NEGATIVE / RBC: Many /HPF / WBC < 5 /HPF   Sq Epi: x / Non Sq Epi: x / Bacteria: Present /HPF    RADIOLOGY & ADDITIONAL TESTS:      ACC: [id number] EXAM:  CT ABDOMEN AND PELVIS   ORDERED BY: BART GONZALES     PROCEDURE DATE:  2023          INTERPRETATION:  CLINICAL INFORMATION: Hematuria    COMPARISON: None.    CONTRAST/COMPLICATIONS:  IV Contrast: None    PROCEDURE:  CT of the Abdomen and Pelvis was performed.  Sagittal and coronal reformats were performed.    FINDINGS:  LOWER CHEST: Within normal limits.    LIVER: Within normal limits.  BILE DUCTS: Normal caliber.  GALLBLADDER: Within normal limits.  SPLEEN: Within normal limits.  PANCREAS: Within normal limits.  ADRENALS: Within normal limits.  KIDNEYS/URETERS: Left nephrectomy. Right kidney without stones or   hydronephrosis. Multiple subcentimeter right renal hypodensities too   small to characterize.    BLADDER: Minimally distended. Doshi catheter.  REPRODUCTIVE ORGANS: Prostate within normal limits.    BOWEL: No bowel obstruction. Appendix is normal.  PERITONEUM: No ascites.  VESSELS: Atherosclerotic changes.  RETROPERITONEUM/LYMPH NODES: No lymphadenopathy.  ABDOMINAL WALL: Atrophy and laxity of the left rectus abdominis.  BONES: Multilevel degenerative changes.    IMPRESSION:  No acute or relevant findings. Incidental comments as above.

## 2023-03-28 NOTE — H&P ADULT - PROBLEM SELECTOR PROBLEM 1
Review of patient chart shows he has Oxy 30 mg tablets from an outside provider, Rx was written for 9/11/19. Patient can fill this if he still has rx Hematuria

## 2023-03-28 NOTE — CONSULT NOTE ADULT - SUBJECTIVE AND OBJECTIVE BOX
Patient is a 82y old  Male who presents with a chief complaint of hematuria    HPI: 83 y/o male w/ hx A-Fib on Eliquis, HTN, hyperlipidemia, chronic gout, depression, kidney cancer s/p L nephrectomy p/w 2 episodes of gross hematuria since this afternoon.  Notes felt like he had to try harder to urinate today, otherwise feels well.  Denies dizziness, f/c, cp, sob, abd pain, n/v/d, dysuria, flank/ back pain.     Note: As discussed above. Patient reports that he believes he took too much Eliquis this morning and believes he accidentally doubled his dose. He reports to onset of hematuria shortly after with no associated dysuria or pain. He reports to no previous bouts of hematuria. His L nephrectomy occurred in [place] in 2008 and he has not followed up with  since. Clots appreciated in urinal bedside. Patient irrigated bedside with 22 6eye catheter with minimal clot expulsion and clearing of urine to pink clear. 18fr coude placed using sterile method for further urine monitoring and PRN irrigation. Tolerated procedure well.      Vital Signs Last 24 Hrs  T(C): 36.8 (28 Mar 2023 18:27), Max: 37 (28 Mar 2023 15:33)  T(F): 98.3 (28 Mar 2023 18:27), Max: 98.6 (28 Mar 2023 15:33)  HR: 61 (28 Mar 2023 18:27) (61 - 86)  BP: 134/80 (28 Mar 2023 18:27) (126/68 - 134/80)  BP(mean): --  RR: 16 (28 Mar 2023 18:27) (16 - 18)  SpO2: 99% (28 Mar 2023 18:27) (99% - 99%)    Parameters below as of 28 Mar 2023 18:27  Patient On (Oxygen Delivery Method): room air      I&O's Summary      PE:  Gen: NAD  Abd: soft, nt, nd  : 18fr coude in place, urine pink clear, no suprapubic tenderness  REID:    LABS:                        9.0    4.97  )-----------( 174      ( 28 Mar 2023 16:42 )             28.7     03-28    143  |  108  |  25<H>  ----------------------------<  115<H>  4.8   |  27  |  1.55<H>    Ca    9.2      28 Mar 2023 16:42    TPro  7.1  /  Alb  3.8  /  TBili  0.5  /  DBili  x   /  AST  21  /  ALT  11  /  AlkPhos  59  03-28      Cultures      A/P

## 2023-03-28 NOTE — CONSULT NOTE ADULT - PROBLEM SELECTOR RECOMMENDATION 9
-full recs pending CT
Pt presenting following 2 episodes of hematuria, likely 2/2 accidental double dosage of eliquis in the AM. Pt otherwise denies acute symptoms.    Recommend:  - mgmt per urology team

## 2023-03-28 NOTE — ED ADULT NURSE NOTE - OBJECTIVE STATEMENT
pt received into spot 6 A&Ox4 HARD OF HEARING BIBA from home for eval of bright red blood noted in urine today while leaving his building. takes blood thinner for cardiac issues in the past. Pt denies CP SOB abd pain N/V. When asked about lightheadedness dizziness weakness repeatedly pt answers "I don't feel great but this is pretty normal for me" Pt states he has multiple different HHA's one is there for 4 hrs 4 times a week states the only for today never showed up. Unlabored respirations abd nondistended placed in gown for eval pending ED MD. denies hx hematuria

## 2023-03-28 NOTE — ED PROVIDER NOTE - CLINICAL SUMMARY MEDICAL DECISION MAKING FREE TEXT BOX
81 y/o male w/ hx A-Fib on Eliquis, HTN, hyperlipidemia, chronic gout, depression, kidney cancer s/p L nephrectomy p/w 2 episodes of gross hematuria since this afternoon.  Notes felt like he had to try harder to urinate today, otherwise feels well.  Denies dizziness, f/c, cp, sob, abd pain, n/v/d, dysuria, flank/ back pain.  VSS  Exam grossly unrevealing  Will get basic labs, ua, r/o r/o SELENA, anemia, uti  Doubt kidney stone  Re-eval

## 2023-03-28 NOTE — CONSULT NOTE ADULT - ASSESSMENT
81 y/o male w/ hx A-Fib on Eliquis, HTN, hyperlipidemia, chronic gout, depression, kidney cancer s/p L nephrectomy p/w 2 episodes of gross hematuria since this afternoon.
83 y/o male w/ hx A-Fib on Eliquis, HTN, hyperlipidemia, chronic gout, depression, kidney cancer s/p L nephrectomy p/w 2 episodes of gross hematuria likely iatrogenic 2/2 to double dose of AC at home

## 2023-03-28 NOTE — H&P ADULT - ASSESSMENT
81 y/o male w/ hx A-Fib on Eliquis, HTN, hyperlipidemia, chronic gout, depression, kidney cancer s/p L nephrectomy p/w 2 episodes of gross hematuria since this afternoon.

## 2023-03-28 NOTE — CONSULT NOTE ADULT - SUBJECTIVE AND OBJECTIVE BOX
*INCOMPLETE*  CASSANDRA BLISS  82y  Male      Patient is a 82y old  Male who presents with a chief complaint of Hematuria (28 Mar 2023 21:27)        PAST MEDICAL/SURGICAL HISTORY  PAST MEDICAL & SURGICAL HISTORY:  Depression      Hypertension      HLD (hyperlipidemia)      Afib      Cancer of kidney      Gout      S/P MVR (mitral valve repair)      H/O left nephrectomy          REVIEW OF SYSTEMS:  CONSTITUTIONAL: No fever, weight loss, or fatigue  EYES: No eye pain, visual disturbances, or discharge  ENMT:  No difficulty hearing, tinnitus, vertigo; No sinus or throat pain  NECK: No pain or stiffness  BREASTS: No pain, masses, or nipple discharge  RESPIRATORY: No cough, wheezing, chills or hemoptysis; No shortness of breath  CARDIOVASCULAR: No chest pain, palpitations, dizziness, or leg swelling  GASTROINTESTINAL: No abdominal or epigastric pain. No nausea, vomiting, or hematemesis; No diarrhea or constipation. No melena or hematochezia.  GENITOURINARY: No dysuria, frequency, hematuria, or incontinence  NEUROLOGICAL: No headaches, memory loss, loss of strength, numbness, or tremors  SKIN: No itching, burning, rashes, or lesions   LYMPH NODES: No enlarged glands  ENDOCRINE: No heat or cold intolerance; No hair loss  MUSCULOSKELETAL: No joint pain or swelling; No muscle, back, or extremity pain  PSYCHIATRIC: No depression, anxiety, mood swings, or difficulty sleeping  HEME/LYMPH: No easy bruising, or bleeding gums  ALLERY AND IMMUNOLOGIC: No hives or eczema    T(C): 36.9 (03-28-23 @ 21:28), Max: 37 (03-28-23 @ 15:33)  HR: 72 (03-28-23 @ 21:28) (61 - 86)  BP: 130/75 (03-28-23 @ 21:28) (126/68 - 134/80)  RR: 16 (03-28-23 @ 21:28) (16 - 18)  SpO2: 99% (03-28-23 @ 21:28) (99% - 99%)  Wt(kg): --Vital Signs Last 24 Hrs  T(C): 36.9 (28 Mar 2023 21:28), Max: 37 (28 Mar 2023 15:33)  T(F): 98.4 (28 Mar 2023 21:28), Max: 98.6 (28 Mar 2023 15:33)  HR: 72 (28 Mar 2023 21:28) (61 - 86)  BP: 130/75 (28 Mar 2023 21:28) (126/68 - 134/80)  BP(mean): --  RR: 16 (28 Mar 2023 21:28) (16 - 18)  SpO2: 99% (28 Mar 2023 21:28) (99% - 99%)    Parameters below as of 28 Mar 2023 21:28  Patient On (Oxygen Delivery Method): room air        PHYSICAL EXAM:  GENERAL: NAD, well-groomed, well-developed  HEAD:  Atraumatic, Normocephalic  EYES: EOMI, PERRLA, conjunctiva and sclera clear  ENMT: No tonsillar erythema, exudates, or enlargement; Moist mucous membranes, Good dentition, No lesions  NECK: Supple, No JVD, Normal thyroid  NERVOUS SYSTEM:  Alert & Oriented X3, Good concentration; Motor Strength 5/5 B/L upper and lower extremities; DTRs 2+ intact and symmetric  CHEST/LUNG: Clear to percussion bilaterally; No rales, rhonchi, wheezing, or rubs  HEART: Regular rate and rhythm; No murmurs, rubs, or gallops  ABDOMEN: Soft, Nontender, Nondistended; Bowel sounds present  EXTREMITIES:  2+ Peripheral Pulses, No clubbing, cyanosis, or edema  LYMPH: No lymphadenopathy noted  SKIN: No rashes or lesions    Consultant(s) Notes Reviewed:  [x ] YES  [ ] NO  Care Discussed with Consultants/Other Providers [ x] YES  [ ] NO    LABS:  CBC   03-28-23 @ 19:48  Hematcorit 29.5  Hemoglobin 9.1  Mean Cell Hemoglobin 25.2  Platelet Count-Automated 178  RBC Count 3.61  Red Cell Distrib Width 17.9  Wbc Count 5.26  03-28-23 @ 16:42  Hematcorit 28.7  Hemoglobin 9.0  Mean Cell Hemoglobin 25.5  Platelet Count-Automated 174  RBC Count 3.53  Red Cell Distrib Width 18.0  Wbc Count 4.97      BMP  03-28-23 @ 16:42  Anion Gap. Serum 8  Blood Urea Nitrogen,Serm 25  Calcium, Total Serum 9.2  Carbon Dioxide, Serum 27  Chloride, Serum 108  Creatinine, Serum 1.55  eGFR in  --  eGFR in Non Afican American --  Gloucose, serum 115  Potassium, Serum 4.8  Sodium, Serum 143                  CMP  03-28-23 @ 16:42  Marianne Aminotransferase(ALT/SGPT)11  Albumin, Serum 3.8  Alkaline Phosphatase, Serum 59  Anion Gap, Serum 8  Aspartate Aminotransferase (AST/SGOT)21  Bilirubin Total, Serum 0.5  Blood Urea Nitrogen, Serum 25  Calcium,Total Serum 9.2  Carbon Dioxide, Serum 27  Chloride, Serum 108  Creatinine, Serum 1.55  eGFR if  --  eGFR if Non African American --  Glucose, Serum 115  Potassium, Serum 4.8  Protein Total, Serum 7.1  Sodium, Serum 143                          PT/INR      Amylase/Lipase            RADIOLOGY & ADDITIONAL TESTS:    Imaging Personally Reviewed:  [ ] YES  [ ] NO *INCOMPLETE*  CASSANDRA BLISS  82y  Male      Patient is a 82y old  Male who presents with a chief complaint of Hematuria (28 Mar 2023 21:27)    81 y/o male w/ hx A-Fib on Eliquis, HTN, hyperlipidemia, chronic gout, depression, kidney cancer s/p L nephrectomy p/w 2 episodes of gross hematuria since this afternoon.  Notes felt like he had to try harder to urinate today, otherwise feels well. He also reports that he has had intermittent bouts of constipation in which he feels as though he often does not fully pass stool. He has taken high fiber supplementatin intermittently, but is currently not taking any medications to support regularity. He denies melena or hematochezia but does endorse passing mucus in the stool every now and then.  Denies dizziness, f/c, cp, sob, abd pain, n/v/d, dysuria, flank/ back pain.      PAST MEDICAL/SURGICAL HISTORY  PAST MEDICAL & SURGICAL HISTORY:  Depression      Hypertension      HLD (hyperlipidemia)      Afib      Cancer of kidney      Gout      S/P MVR (mitral valve repair)      H/O left nephrectomy          REVIEW OF SYSTEMS:  CONSTITUTIONAL: No fever, weight loss, or fatigue  NECK: No pain or stiffness  RESPIRATORY: No cough, wheezing, chills or hemoptysis; No shortness of breath  CARDIOVASCULAR: No chest pain, palpitations, dizziness, or leg swelling  GASTROINTESTINAL: No abdominal or epigastric pain. No nausea, vomiting, or hematemesis; No diarrhea or constipation. No melena or hematochezia.  GENITOURINARY: + hematuria  NEUROLOGICAL: No headaches, memory loss, loss of strength, numbness, or tremors  SKIN: No itching, burning, rashes, or lesions   LYMPH NODES: No enlarged glands  ENDOCRINE: No heat or cold intolerance; No hair loss  MUSCULOSKELETAL: No joint pain or swelling; No muscle, back, or extremity pain  HEME/LYMPH: No easy bruising, or bleeding gums    T(C): 36.9 (03-28-23 @ 21:28), Max: 37 (03-28-23 @ 15:33)  HR: 72 (03-28-23 @ 21:28) (61 - 86)  BP: 130/75 (03-28-23 @ 21:28) (126/68 - 134/80)  RR: 16 (03-28-23 @ 21:28) (16 - 18)  SpO2: 99% (03-28-23 @ 21:28) (99% - 99%)  Wt(kg): --Vital Signs Last 24 Hrs  T(C): 36.9 (28 Mar 2023 21:28), Max: 37 (28 Mar 2023 15:33)  T(F): 98.4 (28 Mar 2023 21:28), Max: 98.6 (28 Mar 2023 15:33)  HR: 72 (28 Mar 2023 21:28) (61 - 86)  BP: 130/75 (28 Mar 2023 21:28) (126/68 - 134/80)  BP(mean): --  RR: 16 (28 Mar 2023 21:28) (16 - 18)  SpO2: 99% (28 Mar 2023 21:28) (99% - 99%)    Parameters below as of 28 Mar 2023 21:28  Patient On (Oxygen Delivery Method): room air        PHYSICAL EXAM:  GENERAL: NAD, well-groomed, well-developed  HEAD:  Atraumatic, Normocephalic  ENMT: No tonsillar erythema, exudates, or enlargement; Moist mucous membranes  NECK: Supple, No JVD, Normal thyroid  NERVOUS SYSTEM:  Alert & Oriented X3, Good concentration; Motor Strength 5/5 B/L upper and lower extremities; DTRs 2+ intact and symmetric  CHEST/LUNG: CTA bilaterally; No rales, rhonchi, wheezing, or rubs, unlabored respirations  HEART: +S1/S2, No murmurs, rubs, or gallops  ABDOMEN: Soft, Nontender, Nondistended; Bowel sounds present  EXTREMITIES:  2+ Peripheral Pulses, No clubbing, cyanosis, or edema  : alfaro catheter in place, draining bright red urine  LYMPH: No lymphadenopathy noted  SKIN: No rashes or lesions    Consultant(s) Notes Reviewed:  [x ] YES  [ ] NO  Care Discussed with Consultants/Other Providers [ x] YES  [ ] NO    LABS:  CBC   03-28-23 @ 19:48  Hematcorit 29.5  Hemoglobin 9.1  Mean Cell Hemoglobin 25.2  Platelet Count-Automated 178  RBC Count 3.61  Red Cell Distrib Width 17.9  Wbc Count 5.26  03-28-23 @ 16:42  Hematcorit 28.7  Hemoglobin 9.0  Mean Cell Hemoglobin 25.5  Platelet Count-Automated 174  RBC Count 3.53  Red Cell Distrib Width 18.0  Wbc Count 4.97      BMP  03-28-23 @ 16:42  Anion Gap. Serum 8  Blood Urea Nitrogen,Serm 25  Calcium, Total Serum 9.2  Carbon Dioxide, Serum 27  Chloride, Serum 108  Creatinine, Serum 1.55  eGFR in  --  eGFR in Non Afican American --  Gloucose, serum 115  Potassium, Serum 4.8  Sodium, Serum 143                  CMP  03-28-23 @ 16:42  Marianne Aminotransferase(ALT/SGPT)11  Albumin, Serum 3.8  Alkaline Phosphatase, Serum 59  Anion Gap, Serum 8  Aspartate Aminotransferase (AST/SGOT)21  Bilirubin Total, Serum 0.5  Blood Urea Nitrogen, Serum 25  Calcium,Total Serum 9.2  Carbon Dioxide, Serum 27  Chloride, Serum 108  Creatinine, Serum 1.55  eGFR if  --  eGFR if Non African American --  Glucose, Serum 115  Potassium, Serum 4.8  Protein Total, Serum 7.1  Sodium, Serum 143                          PT/INR      Amylase/Lipase            RADIOLOGY & ADDITIONAL TESTS:    Imaging Personally Reviewed:  [ ] YES  [ ] NO CASSANDRA BLISS  82y  Male      Patient is a 82y old  Male who presents with a chief complaint of Hematuria (28 Mar 2023 21:27)    81 y/o male w/ hx A-Fib on Eliquis, HTN, hyperlipidemia, chronic gout, depression, kidney cancer s/p L nephrectomy p/w 2 episodes of gross hematuria since this afternoon.  Notes felt like he had to try harder to urinate today, otherwise feels well. He also reports that he has had intermittent bouts of constipation in which he feels as though he often does not fully pass stool. He has taken high fiber supplementatin intermittently, but is currently not taking any medications to support regularity. He denies melena or hematochezia but does endorse passing mucus in the stool every now and then.  Denies dizziness, f/c, cp, sob, abd pain, n/v/d, dysuria, flank/ back pain.      PAST MEDICAL/SURGICAL HISTORY  PAST MEDICAL & SURGICAL HISTORY:  Depression      Hypertension      HLD (hyperlipidemia)      Afib      Cancer of kidney      Gout      S/P MVR (mitral valve repair)      H/O left nephrectomy          REVIEW OF SYSTEMS:  CONSTITUTIONAL: No fever, weight loss, or fatigue  NECK: No pain or stiffness  RESPIRATORY: No cough, wheezing, chills or hemoptysis; No shortness of breath  CARDIOVASCULAR: No chest pain, palpitations, dizziness, or leg swelling  GASTROINTESTINAL: No abdominal or epigastric pain. No nausea, vomiting, or hematemesis; No diarrhea or constipation. No melena or hematochezia.  GENITOURINARY: + hematuria  NEUROLOGICAL: No headaches, memory loss, loss of strength, numbness, or tremors  SKIN: No itching, burning, rashes, or lesions   LYMPH NODES: No enlarged glands  ENDOCRINE: No heat or cold intolerance; No hair loss  MUSCULOSKELETAL: No joint pain or swelling; No muscle, back, or extremity pain  HEME/LYMPH: No easy bruising, or bleeding gums    T(C): 36.9 (03-28-23 @ 21:28), Max: 37 (03-28-23 @ 15:33)  HR: 72 (03-28-23 @ 21:28) (61 - 86)  BP: 130/75 (03-28-23 @ 21:28) (126/68 - 134/80)  RR: 16 (03-28-23 @ 21:28) (16 - 18)  SpO2: 99% (03-28-23 @ 21:28) (99% - 99%)  Wt(kg): --Vital Signs Last 24 Hrs  T(C): 36.9 (28 Mar 2023 21:28), Max: 37 (28 Mar 2023 15:33)  T(F): 98.4 (28 Mar 2023 21:28), Max: 98.6 (28 Mar 2023 15:33)  HR: 72 (28 Mar 2023 21:28) (61 - 86)  BP: 130/75 (28 Mar 2023 21:28) (126/68 - 134/80)  BP(mean): --  RR: 16 (28 Mar 2023 21:28) (16 - 18)  SpO2: 99% (28 Mar 2023 21:28) (99% - 99%)    Parameters below as of 28 Mar 2023 21:28  Patient On (Oxygen Delivery Method): room air        PHYSICAL EXAM:  GENERAL: NAD, well-groomed, well-developed  HEAD:  Atraumatic, Normocephalic  ENMT: No tonsillar erythema, exudates, or enlargement; Moist mucous membranes  NECK: Supple, No JVD, Normal thyroid  NERVOUS SYSTEM:  Alert & Oriented X3, Good concentration; Motor Strength 5/5 B/L upper and lower extremities; DTRs 2+ intact and symmetric  CHEST/LUNG: CTA bilaterally; No rales, rhonchi, wheezing, or rubs, unlabored respirations  HEART: +S1/S2, No murmurs, rubs, or gallops  ABDOMEN: Soft, Nontender, Nondistended; Bowel sounds present  EXTREMITIES:  2+ Peripheral Pulses, No clubbing, cyanosis, or edema  : alfaro catheter in place, draining bright red urine  LYMPH: No lymphadenopathy noted  SKIN: No rashes or lesions    Consultant(s) Notes Reviewed:  [x ] YES  [ ] NO  Care Discussed with Consultants/Other Providers [ x] YES  [ ] NO    LABS:  CBC   03-28-23 @ 19:48  Hematcorit 29.5  Hemoglobin 9.1  Mean Cell Hemoglobin 25.2  Platelet Count-Automated 178  RBC Count 3.61  Red Cell Distrib Width 17.9  Wbc Count 5.26  03-28-23 @ 16:42  Hematcorit 28.7  Hemoglobin 9.0  Mean Cell Hemoglobin 25.5  Platelet Count-Automated 174  RBC Count 3.53  Red Cell Distrib Width 18.0  Wbc Count 4.97      BMP  03-28-23 @ 16:42  Anion Gap. Serum 8  Blood Urea Nitrogen,Serm 25  Calcium, Total Serum 9.2  Carbon Dioxide, Serum 27  Chloride, Serum 108  Creatinine, Serum 1.55  eGFR in  --  eGFR in Non Afican American --  Gloucose, serum 115  Potassium, Serum 4.8  Sodium, Serum 143                  CMP  03-28-23 @ 16:42  Marianne Aminotransferase(ALT/SGPT)11  Albumin, Serum 3.8  Alkaline Phosphatase, Serum 59  Anion Gap, Serum 8  Aspartate Aminotransferase (AST/SGOT)21  Bilirubin Total, Serum 0.5  Blood Urea Nitrogen, Serum 25  Calcium,Total Serum 9.2  Carbon Dioxide, Serum 27  Chloride, Serum 108  Creatinine, Serum 1.55  eGFR if  --  eGFR if Non African American --  Glucose, Serum 115  Potassium, Serum 4.8  Protein Total, Serum 7.1  Sodium, Serum 143                          PT/INR      Amylase/Lipase            RADIOLOGY & ADDITIONAL TESTS:    Imaging Personally Reviewed:  [ ] YES  [ ] NO

## 2023-03-28 NOTE — CONSULT NOTE ADULT - NS ATTEND AMEND GEN_ALL_CORE FT
Admission assessment complete as noted. Patient oriented to room and unit. Plan of care reviewed and patient verbalizes understanding. Questions encouraged and answered. Patent encouraged to call for needs or concerns. 83 y/o male w/ hx A-Fib on Eliquis, HTN, hyperlipidemia, chronic gout, depression, kidney cancer s/p L nephrectomy with gross hematuria in setting of accidentally doubling eliquis dose. UA +nitrite and +bacteria, urine culture cancelled by lab. Non-con CT within normal limits, no hydro of solitary kidney. Will admit to urology, medicine co-follow. Doshi catheter placed and irrigated, will monitor output closely. Ceftriaxone. Hold eliquis. Home meds per medicine.

## 2023-03-28 NOTE — ED ADULT TRIAGE NOTE - CHIEF COMPLAINT QUOTE
Patient coming from home with hematuria. Hx of 1 kidney, Afib (on Eliquis) and previous hx of open heart surgery x 1.5 hours ago. Denies dizziness or pain. Patient coming from home with hematuria x 1.5 hours ago. Hx of 1 kidney, Afib (on Eliquis) and previous hx of open heart surgery Denies dizziness or pain. 203.9

## 2023-03-28 NOTE — H&P ADULT - PROBLEM SELECTOR PLAN 1
-admit to   -hold a/c a tthis time  -maintain alfaro catheter for strict monitoring of urine status  -irrigate PRN  -monitor I&Os  -bladder spasm regimen  -bowel regimen  -home meds  -medicine consultation  -DVT prophylaxis  -Abx  -Ucx

## 2023-03-28 NOTE — CONSULT NOTE ADULT - PROBLEM SELECTOR PROBLEM 1
Writer closing case.   
Hematuria
Hematuria

## 2023-03-29 DIAGNOSIS — D64.9 ANEMIA, UNSPECIFIED: ICD-10-CM

## 2023-03-29 LAB
ALBUMIN SERPL ELPH-MCNC: 3.7 G/DL — SIGNIFICANT CHANGE UP (ref 3.3–5)
ALP SERPL-CCNC: 50 U/L — SIGNIFICANT CHANGE UP (ref 40–120)
ALT FLD-CCNC: 9 U/L — LOW (ref 10–45)
ANION GAP SERPL CALC-SCNC: 10 MMOL/L — SIGNIFICANT CHANGE UP (ref 5–17)
APTT BLD: 35.3 SEC — SIGNIFICANT CHANGE UP (ref 27.5–35.5)
AST SERPL-CCNC: 16 U/L — SIGNIFICANT CHANGE UP (ref 10–40)
BASOPHILS # BLD AUTO: 0.03 K/UL — SIGNIFICANT CHANGE UP (ref 0–0.2)
BASOPHILS NFR BLD AUTO: 0.6 % — SIGNIFICANT CHANGE UP (ref 0–2)
BILIRUB SERPL-MCNC: 0.4 MG/DL — SIGNIFICANT CHANGE UP (ref 0.2–1.2)
BUN SERPL-MCNC: 24 MG/DL — HIGH (ref 7–23)
CALCIUM SERPL-MCNC: 9 MG/DL — SIGNIFICANT CHANGE UP (ref 8.4–10.5)
CHLORIDE SERPL-SCNC: 106 MMOL/L — SIGNIFICANT CHANGE UP (ref 96–108)
CO2 SERPL-SCNC: 23 MMOL/L — SIGNIFICANT CHANGE UP (ref 22–31)
CREAT SERPL-MCNC: 1.42 MG/DL — HIGH (ref 0.5–1.3)
DIGOXIN SERPL-MCNC: 0.9 NG/ML — SIGNIFICANT CHANGE UP (ref 0.8–2)
EGFR: 49 ML/MIN/1.73M2 — LOW
EOSINOPHIL # BLD AUTO: 0.24 K/UL — SIGNIFICANT CHANGE UP (ref 0–0.5)
EOSINOPHIL NFR BLD AUTO: 4.7 % — SIGNIFICANT CHANGE UP (ref 0–6)
GLUCOSE SERPL-MCNC: 111 MG/DL — HIGH (ref 70–99)
HCT VFR BLD CALC: 26.1 % — LOW (ref 39–50)
HGB BLD-MCNC: 7.9 G/DL — LOW (ref 13–17)
IMM GRANULOCYTES NFR BLD AUTO: 0.4 % — SIGNIFICANT CHANGE UP (ref 0–0.9)
INR BLD: 1.42 — HIGH (ref 0.88–1.16)
LYMPHOCYTES # BLD AUTO: 0.5 K/UL — LOW (ref 1–3.3)
LYMPHOCYTES # BLD AUTO: 9.8 % — LOW (ref 13–44)
MAGNESIUM SERPL-MCNC: 1.6 MG/DL — SIGNIFICANT CHANGE UP (ref 1.6–2.6)
MCHC RBC-ENTMCNC: 24.9 PG — LOW (ref 27–34)
MCHC RBC-ENTMCNC: 30.3 GM/DL — LOW (ref 32–36)
MCV RBC AUTO: 82.3 FL — SIGNIFICANT CHANGE UP (ref 80–100)
MONOCYTES # BLD AUTO: 0.39 K/UL — SIGNIFICANT CHANGE UP (ref 0–0.9)
MONOCYTES NFR BLD AUTO: 7.6 % — SIGNIFICANT CHANGE UP (ref 2–14)
NEUTROPHILS # BLD AUTO: 3.94 K/UL — SIGNIFICANT CHANGE UP (ref 1.8–7.4)
NEUTROPHILS NFR BLD AUTO: 76.9 % — SIGNIFICANT CHANGE UP (ref 43–77)
NRBC # BLD: 0 /100 WBCS — SIGNIFICANT CHANGE UP (ref 0–0)
PHOSPHATE SERPL-MCNC: 3.3 MG/DL — SIGNIFICANT CHANGE UP (ref 2.5–4.5)
PLATELET # BLD AUTO: 146 K/UL — LOW (ref 150–400)
POTASSIUM SERPL-MCNC: 4.1 MMOL/L — SIGNIFICANT CHANGE UP (ref 3.5–5.3)
POTASSIUM SERPL-SCNC: 4.1 MMOL/L — SIGNIFICANT CHANGE UP (ref 3.5–5.3)
PROT SERPL-MCNC: 6.3 G/DL — SIGNIFICANT CHANGE UP (ref 6–8.3)
PROTHROM AB SERPL-ACNC: 16.9 SEC — HIGH (ref 10.5–13.4)
RBC # BLD: 3.17 M/UL — LOW (ref 4.2–5.8)
RBC # FLD: 17.7 % — HIGH (ref 10.3–14.5)
SODIUM SERPL-SCNC: 139 MMOL/L — SIGNIFICANT CHANGE UP (ref 135–145)
WBC # BLD: 5.12 K/UL — SIGNIFICANT CHANGE UP (ref 3.8–10.5)
WBC # FLD AUTO: 5.12 K/UL — SIGNIFICANT CHANGE UP (ref 3.8–10.5)

## 2023-03-29 PROCEDURE — 51703 INSERT BLADDER CATH COMPLEX: CPT

## 2023-03-29 PROCEDURE — 99232 SBSQ HOSP IP/OBS MODERATE 35: CPT | Mod: GC

## 2023-03-29 RX ORDER — INFLUENZA VIRUS VACCINE 15; 15; 15; 15 UG/.5ML; UG/.5ML; UG/.5ML; UG/.5ML
0.7 SUSPENSION INTRAMUSCULAR ONCE
Refills: 0 | Status: DISCONTINUED | OUTPATIENT
Start: 2023-03-29 | End: 2023-03-31

## 2023-03-29 RX ORDER — CARVEDILOL PHOSPHATE 80 MG/1
6.25 CAPSULE, EXTENDED RELEASE ORAL EVERY 12 HOURS
Refills: 0 | Status: DISCONTINUED | OUTPATIENT
Start: 2023-03-29 | End: 2023-03-31

## 2023-03-29 RX ORDER — PANTOPRAZOLE SODIUM 20 MG/1
40 TABLET, DELAYED RELEASE ORAL
Refills: 0 | Status: DISCONTINUED | OUTPATIENT
Start: 2023-03-29 | End: 2023-03-31

## 2023-03-29 RX ORDER — HEPARIN SODIUM 5000 [USP'U]/ML
5000 INJECTION INTRAVENOUS; SUBCUTANEOUS EVERY 12 HOURS
Refills: 0 | Status: DISCONTINUED | OUTPATIENT
Start: 2023-03-29 | End: 2023-03-31

## 2023-03-29 RX ADMIN — CARVEDILOL PHOSPHATE 6.25 MILLIGRAM(S): 80 CAPSULE, EXTENDED RELEASE ORAL at 19:54

## 2023-03-29 RX ADMIN — SIMVASTATIN 10 MILLIGRAM(S): 20 TABLET, FILM COATED ORAL at 23:32

## 2023-03-29 RX ADMIN — Medication 100 MILLIGRAM(S): at 12:07

## 2023-03-29 RX ADMIN — BUPROPION HYDROCHLORIDE 300 MILLIGRAM(S): 150 TABLET, EXTENDED RELEASE ORAL at 12:07

## 2023-03-29 RX ADMIN — TAMSULOSIN HYDROCHLORIDE 0.4 MILLIGRAM(S): 0.4 CAPSULE ORAL at 22:08

## 2023-03-29 RX ADMIN — CEFTRIAXONE 100 MILLIGRAM(S): 500 INJECTION, POWDER, FOR SOLUTION INTRAMUSCULAR; INTRAVENOUS at 18:22

## 2023-03-29 RX ADMIN — SENNA PLUS 2 TABLET(S): 8.6 TABLET ORAL at 22:08

## 2023-03-29 RX ADMIN — HEPARIN SODIUM 5000 UNIT(S): 5000 INJECTION INTRAVENOUS; SUBCUTANEOUS at 18:22

## 2023-03-29 RX ADMIN — PANTOPRAZOLE SODIUM 40 MILLIGRAM(S): 20 TABLET, DELAYED RELEASE ORAL at 06:54

## 2023-03-29 NOTE — PATIENT PROFILE ADULT - FUNCTIONAL ASSESSMENT - BASIC MOBILITY 6.
2-calculated by average/Not able to assess (calculate score using [facility] averaging method)

## 2023-03-29 NOTE — PROGRESS NOTE ADULT - PROBLEM SELECTOR PLAN 1
-admitted to   -hold a/c a this time  -maintain alfaro catheter for strict monitoring of urine status  -irrigate PRN  -monitor I&Os  -bladder spasm regimen  -bowel regimen  -home meds  -medicine consultation  -DVT prophylaxis  -Abx  -Ucx

## 2023-03-29 NOTE — PROGRESS NOTE ADULT - ASSESSMENT
83 y/o male w/ hx A-Fib on Eliquis, HTN, hyperlipidemia, chronic gout, depression, kidney cancer s/p L nephrectomy p/w 2 episodes of gross hematuria likely iatrogenic 2/2 to double dose of AC at home.    #Hematuria  - Pt presenting following 2 episodes of hematuria, likely 2/2 accidental double dosage of eliquis in the AM. Pt otherwise denies acute symptoms  - mgmt per urology team.    #Chronic atrial fibrillation.   - Pt has hx of Afib, home meds include digoxin 125 mcg, Coreg 6.25mg BID and Eliquis 5mg BID  - Obtain digoxin level in AM, cont digoxin 125mcg PO qd if level <1.2  - Hold eliquis ISO hematuria  - Hold carvedilol 6.25mg PO q12hrs for now; restart w/ hold parameters if HD stable and hgb remains stable.

## 2023-03-29 NOTE — PATIENT PROFILE ADULT - FALL HARM RISK - HARM RISK INTERVENTIONS

## 2023-03-29 NOTE — PROCEDURE NOTE - NSICDXPROCEDURE_GEN_ALL_CORE_FT
PROCEDURES:  Complex catheterization of urethra 29-Mar-2023 13:07:51  Saritha Ramirez  Bladder irrigation 29-Mar-2023 13:08:23  Saritha Ramirez

## 2023-03-30 ENCOUNTER — TRANSCRIPTION ENCOUNTER (OUTPATIENT)
Age: 83
End: 2023-03-30

## 2023-03-30 LAB
ANION GAP SERPL CALC-SCNC: 8 MMOL/L — SIGNIFICANT CHANGE UP (ref 5–17)
BLD GP AB SCN SERPL QL: NEGATIVE — SIGNIFICANT CHANGE UP
BUN SERPL-MCNC: 20 MG/DL — SIGNIFICANT CHANGE UP (ref 7–23)
CALCIUM SERPL-MCNC: 8.9 MG/DL — SIGNIFICANT CHANGE UP (ref 8.4–10.5)
CHLORIDE SERPL-SCNC: 105 MMOL/L — SIGNIFICANT CHANGE UP (ref 96–108)
CO2 SERPL-SCNC: 25 MMOL/L — SIGNIFICANT CHANGE UP (ref 22–31)
CREAT SERPL-MCNC: 1.34 MG/DL — HIGH (ref 0.5–1.3)
CULTURE RESULTS: NO GROWTH — SIGNIFICANT CHANGE UP
EGFR: 52 ML/MIN/1.73M2 — LOW
GLUCOSE SERPL-MCNC: 107 MG/DL — HIGH (ref 70–99)
HCT VFR BLD CALC: 25 % — LOW (ref 39–50)
HGB BLD-MCNC: 7.6 G/DL — LOW (ref 13–17)
MAGNESIUM SERPL-MCNC: 1.8 MG/DL — SIGNIFICANT CHANGE UP (ref 1.6–2.6)
MCHC RBC-ENTMCNC: 25.2 PG — LOW (ref 27–34)
MCHC RBC-ENTMCNC: 30.4 GM/DL — LOW (ref 32–36)
MCV RBC AUTO: 82.8 FL — SIGNIFICANT CHANGE UP (ref 80–100)
NRBC # BLD: 0 /100 WBCS — SIGNIFICANT CHANGE UP (ref 0–0)
PHOSPHATE SERPL-MCNC: 3.3 MG/DL — SIGNIFICANT CHANGE UP (ref 2.5–4.5)
PLATELET # BLD AUTO: 142 K/UL — LOW (ref 150–400)
POTASSIUM SERPL-MCNC: 3.8 MMOL/L — SIGNIFICANT CHANGE UP (ref 3.5–5.3)
POTASSIUM SERPL-SCNC: 3.8 MMOL/L — SIGNIFICANT CHANGE UP (ref 3.5–5.3)
RBC # BLD: 3.02 M/UL — LOW (ref 4.2–5.8)
RBC # FLD: 17.9 % — HIGH (ref 10.3–14.5)
RH IG SCN BLD-IMP: POSITIVE — SIGNIFICANT CHANGE UP
SODIUM SERPL-SCNC: 138 MMOL/L — SIGNIFICANT CHANGE UP (ref 135–145)
SPECIMEN SOURCE: SIGNIFICANT CHANGE UP
WBC # BLD: 6.13 K/UL — SIGNIFICANT CHANGE UP (ref 3.8–10.5)
WBC # FLD AUTO: 6.13 K/UL — SIGNIFICANT CHANGE UP (ref 3.8–10.5)

## 2023-03-30 PROCEDURE — 99232 SBSQ HOSP IP/OBS MODERATE 35: CPT | Mod: GC

## 2023-03-30 RX ADMIN — TAMSULOSIN HYDROCHLORIDE 0.4 MILLIGRAM(S): 0.4 CAPSULE ORAL at 22:42

## 2023-03-30 RX ADMIN — Medication 5 MILLIGRAM(S): at 07:28

## 2023-03-30 RX ADMIN — CEFTRIAXONE 100 MILLIGRAM(S): 500 INJECTION, POWDER, FOR SOLUTION INTRAMUSCULAR; INTRAVENOUS at 18:29

## 2023-03-30 RX ADMIN — CARVEDILOL PHOSPHATE 6.25 MILLIGRAM(S): 80 CAPSULE, EXTENDED RELEASE ORAL at 18:29

## 2023-03-30 RX ADMIN — POLYETHYLENE GLYCOL 3350 17 GRAM(S): 17 POWDER, FOR SOLUTION ORAL at 07:28

## 2023-03-30 RX ADMIN — Medication 100 MILLIGRAM(S): at 12:42

## 2023-03-30 RX ADMIN — CARVEDILOL PHOSPHATE 6.25 MILLIGRAM(S): 80 CAPSULE, EXTENDED RELEASE ORAL at 05:46

## 2023-03-30 RX ADMIN — HEPARIN SODIUM 5000 UNIT(S): 5000 INJECTION INTRAVENOUS; SUBCUTANEOUS at 05:46

## 2023-03-30 RX ADMIN — PANTOPRAZOLE SODIUM 40 MILLIGRAM(S): 20 TABLET, DELAYED RELEASE ORAL at 06:22

## 2023-03-30 RX ADMIN — SIMVASTATIN 10 MILLIGRAM(S): 20 TABLET, FILM COATED ORAL at 22:42

## 2023-03-30 RX ADMIN — HEPARIN SODIUM 5000 UNIT(S): 5000 INJECTION INTRAVENOUS; SUBCUTANEOUS at 18:29

## 2023-03-30 RX ADMIN — BUPROPION HYDROCHLORIDE 300 MILLIGRAM(S): 150 TABLET, EXTENDED RELEASE ORAL at 12:42

## 2023-03-30 NOTE — DISCHARGE NOTE PROVIDER - NSDCHHATTENDCERT_GEN_ALL_CORE
family/patient
My signature below certifies that the above stated patient is homebound and upon completion of the Face-To-Face encounter, has the need for intermittent skilled nursing, physical therapy and/or speech or occupational therapy services in their home for their current diagnosis as outlined in their initial plan of care. These services will continue to be monitored by myself or another physician.

## 2023-03-30 NOTE — PROGRESS NOTE ADULT - PROBLEM SELECTOR PLAN 1
-hold a/c a this time  -CBI on- trial off CBI this am  -3 way catheter in  -irrigate PRN  -monitor I&Os  -bladder spasm regimen  -bowel regimen  -home meds  -medicine consultation  -DVT prophylaxis  -Abx  -Ucx

## 2023-03-30 NOTE — DISCHARGE NOTE PROVIDER - NSDCCPCAREPLAN_GEN_ALL_CORE_FT
PRINCIPAL DISCHARGE DIAGNOSIS  Diagnosis: Hematuria  Assessment and Plan of Treatment:       SECONDARY DISCHARGE DIAGNOSES  Diagnosis: GERD (gastroesophageal reflux disease)  Assessment and Plan of Treatment:     Diagnosis: Depression with anxiety  Assessment and Plan of Treatment:     Diagnosis: Chronic atrial fibrillation  Assessment and Plan of Treatment:      PRINCIPAL DISCHARGE DIAGNOSIS  Diagnosis: Hematuria  Assessment and Plan of Treatment:       SECONDARY DISCHARGE DIAGNOSES  Diagnosis: Chronic atrial fibrillation  Assessment and Plan of Treatment:     Diagnosis: GERD (gastroesophageal reflux disease)  Assessment and Plan of Treatment:     Diagnosis: Depression with anxiety  Assessment and Plan of Treatment:

## 2023-03-30 NOTE — DISCHARGE NOTE PROVIDER - NSDCFUADDINST_GEN_ALL_CORE_FT
You may disconnect your alfaro catheter from the drainage bag, and let it drain freely while showering.     Advance diet as tolerated, activity as tolerated. No heavy lifting or straining. Alfaro to leg bag, Stay well hydrated. If fever >100.4 or any change or worsening of symptoms please call doctor or report to ED. Make follow up appointment with Dr Ford.   Alfaro instructions:   Empty the alfaro bag as it gets filled. You do not have to flush the alfaro.   You may disconnect your alfaro catheter from the drainage bag, and let it drain freely while showering.     Advance diet as tolerated, activity as tolerated. No heavy lifting or straining. Alfaro to leg bag, Stay well hydrated. If fever >100.4 or any change or worsening of symptoms please call doctor or report to ED. Make follow up appointment with Dr Ford.   You may Restart your Eliquis on Monday 4/3/23.     Advance diet as tolerated, activity as tolerated. No heavy lifting or straining. Alfaro to leg bag, Stay well hydrated. If fever >100.4 or any change or worsening of symptoms please call doctor or report to ED. Make follow up appointment with Dr Ford.    Walking is essential to your recovery, please walk around as much as you can tolerate once home, try not to stay in bed for too long, as this can slow down your recovery.   Drink plenty of water to flush out the bladder. Drink approx 2-3 liters per day.   To avoid constipation, take a stool softener as needed.   Blood in your urine. It's normal to see blood right after alfaro catheter removal. Urination might be painful, or you might have a sense of urgency or frequent need to urinate. This is normal.

## 2023-03-30 NOTE — DISCHARGE NOTE PROVIDER - NSDCCPTREATMENT_GEN_ALL_CORE_FT
PRINCIPAL PROCEDURE  Procedure: Complex catheterization of urethra  Findings and Treatment:       SECONDARY PROCEDURE  Procedure: Bladder irrigation  Findings and Treatment:

## 2023-03-30 NOTE — PROGRESS NOTE ADULT - NS ATTEND AMEND GEN_ALL_CORE FT
83 y/o male w/ hx A-Fib on Eliquis, HTN, hyperlipidemia, chronic gout, depression, kidney cancer s/p L nephrectomy with gross hematuria in setting of accidentally doubling eliquis dose. UA +nitrite and +bacteria, urine culture cancelled by lab. Non-con CT within normal limits, no hydro of solitary kidney. CBI stopped this AM, urine clearing up and catheter draining well off CBI. Holding eliquis in setting of hematuria. Will consider void trial tomorrow if urine remains clear. Continue home meds, appreciate medicine co-follow.

## 2023-03-30 NOTE — DISCHARGE NOTE PROVIDER - HOSPITAL COURSE
81 y/o male w/ hx A-Fib on Eliquis, HTN, hyperlipidemia, chronic gout, depression, kidney cancer s/p L nephrectomy p/w 2 episodes of gross hematuria. Pt is s/p alfaro catheter placement, manual irrigation, and continuous bladder irrigation. Her hematuria has improved. She is hemodynamically stable and optimized for discharge. 83 y/o male w/ hx A-Fib on Eliquis, HTN, hyperlipidemia, chronic gout, depression, kidney cancer s/p L nephrectomy p/w 2 episodes of gross hematuria. Pt is s/p alfaro catheter placement, manual irrigation, and continuous bladder irrigation. Her hematuria has improved. He is hemodynamically stable and optimized for discharge.     03/31: Patient had no acute overnight events.  Alfaro catheter removed this AM for TOV, which he passed, voiding 150cc  w/ 0cc PVR, and 100cc w/ PVR of 2cc.  Patient evaluated by PT and recommends home PT  Patient lives in a facility where a PT sees him 1-3 times a week.  Per Medicine Consult, he can restart Eliquis on Monday 4/3/23.

## 2023-03-30 NOTE — DISCHARGE NOTE PROVIDER - CARE PROVIDER_API CALL
Elidia Ford)  Urology [facility]  [id number] [place] [address]  Phone: [phone]  Fax: [phone]  Follow Up Time:

## 2023-03-30 NOTE — PROGRESS NOTE ADULT - ASSESSMENT
83 y/o male w/ hx A-Fib on Eliquis, HTN, hyperlipidemia, chronic gout, depression, kidney cancer s/p L nephrectomy p/w 2 episodes of gross hematuria likely iatrogenic 2/2 to double dose of AC at home.    #Hematuria  - Pt presenting following 2 episodes of hematuria, likely 2/2 accidental double dosage of eliquis in the AM. Pt otherwise denies acute symptoms  - mgmt per urology team.    #Chronic atrial fibrillation.   - Pt has hx of Afib, home meds include digoxin 125 mcg, Coreg 6.25mg BID and Eliquis 5mg BID  - Obtain digoxin level in AM, cont digoxin 125mcg PO qd if level <1.2  - C/w carvedilol 6.25mg PO q12hrs  - CHADS-Vasc 3, HAS-BLED 2  - Restart home eliquis 5 BID

## 2023-03-30 NOTE — PROGRESS NOTE ADULT - ATTENDING COMMENTS
pt seen and examined with Dr. Veronica.Denies SOB,CP, abd pain,etc. alfaro in clearing pink colored urine     IRIR rate controlled  good air entry bilaterally  bs present, soft, nt, nd  alfaro in place- CBI on going, dark pink urine  varicose vein in leg, no edema noted.      A/P: 83 y/o male w/ hx A-Fib on Eliquis, HTN, hyperlipidemia, chronic gout, depression, kidney cancer s/p L nephrectomy p/w 2 episodes of gross hematuria likely iatrogenic 2/2 to double dose of AC at home- currently on CBI- urine is dark pink, no other area of bleeding, Hb drop from 9 to 7.9 , platelet 146, cr 1.5--> 1.4, CT left nephrectomy, right kidney no stone-    - Afib on eliquis 9 CHADVASC score 3, 3.2% yearly stroke risk, HASBLED score 2, intermediate risk 1.8-3.2%) - would continue to hold Eliquis for at least 48 hours from last dose even if hematuria clear up ( Cr< 1.5) , will continue Eliquis when urine clearing up given 3.2% yearly stroke risk   - Acute blood loss anemia - keep type and screen -favor to keep hb above 8 given active bleeding and acute drop in patient with cardiac history. on ceftriaxone/ flomax.   - Gross hematuria- consider cystoscopy to evaluate urinary bladder pathology    - HTN - BP stable /can continue coreg with holding parameter ( hold for SBP <100, HR <60s )   heart rate still in 60s-   to reassess in am to determine to restart digoxin -    Thank you for allowing to participate in the care , kalin Veronica.
pt seen and examined with Dr. Veronica.  speaking non-stop, asymptomatic , no chest pain, no shortness of breath  IRIR rate controlled  good air entry bilaterally  bs present, soft, nt, nd  alfaro in place- CBI on going, dark pink urine  varicose vein in leg, no edema noted.    MEDICATIONS  (STANDING):  allopurinol 100 milliGRAM(s) Oral daily  buPROPion XL (24-Hour) . 300 milliGRAM(s) Oral daily  carvedilol 6.25 milliGRAM(s) Oral every 12 hours  cefTRIAXone   IVPB 1000 milliGRAM(s) IV Intermittent every 24 hours  heparin   Injectable 5000 Unit(s) SubCutaneous every 12 hours  influenza  Vaccine (HIGH DOSE) 0.7 milliLiter(s) IntraMuscular once  pantoprazole    Tablet 40 milliGRAM(s) Oral before breakfast  polyethylene glycol 3350 17 Gram(s) Oral daily  senna 2 Tablet(s) Oral at bedtime  simvastatin 10 milliGRAM(s) Oral at bedtime  tamsulosin 0.4 milliGRAM(s) Oral at bedtime    MEDICATIONS  (PRN):  acetaminophen     Tablet .. 650 milliGRAM(s) Oral every 6 hours PRN Temp greater or equal to 38C (100.4F), Mild Pain (1 - 3)  bisacodyl 5 milliGRAM(s) Oral every 12 hours PRN Constipation  ondansetron Injectable 4 milliGRAM(s) IV Push every 6 hours PRN Nausea and/or Vomiting      81 y/o male w/ hx A-Fib on Eliquis, HTN, hyperlipidemia, chronic gout, depression, kidney cancer s/p L nephrectomy p/w 2 episodes of gross hematuria likely iatrogenic 2/2 to double dose of AC at home- currently on CBI- urine is dark pink, no other area of bleeding, Hb drop from 9 to 7.9 , platelet 146, cr 1.5--> 1.4, CT left nepherctomy, right kidney no stone-    - Afib on eliquis- would continue to hold Eliquis for at least 48 hours from last dose even if hematuria clear up ( Cr is around 1.5   - Acute blood loss anemia - keep type and screen -favor to keep hb above 8 given active bleeding and acute drop in patient with cardiac history. on ceftriazone/ flomax.     - HTN - BP stable /can continue coreg with holding parameter ( hold for SBP <100, HR <60s )   heart rate still in 60s-   to reassess in am to determine to restart digoxin -    Thank you for allowing to participate in the care , kalin Veronica.

## 2023-03-30 NOTE — PROGRESS NOTE ADULT - PROBLEM SELECTOR PLAN 2
-hold a/c   -continue rate control  -medicine consultation  -digoxin levels as per medicine
-hold a/c   -continue rate control  -medicine consultation  -digoxin levels WNL

## 2023-03-30 NOTE — PROGRESS NOTE ADULT - ASSESSMENT
83 y/o male w/ hx A-Fib on Eliquis, HTN, hyperlipidemia, chronic gout, depression, kidney cancer s/p L nephrectomy p/w 2 episodes of gross hematuria since this afternoon.

## 2023-03-30 NOTE — DISCHARGE NOTE PROVIDER - NSDCCPGOAL_GEN_ALL_CORE_FT
To get better and follow your care plan as instructed. To get better and follow your care plan as instructed. Ambulation, hydration, and return to activity of daily living.  You may restart Eliquis on Monday 4/3/23.

## 2023-03-30 NOTE — DISCHARGE NOTE PROVIDER - NSDCMRMEDTOKEN_GEN_ALL_CORE_FT
allopurinol 100 mg oral tablet: 0.5 tab(s) orally once a day   apixaban 5 mg oral tablet: 1 tab(s) orally every 12 hours  buPROPion 300 mg/24 hours (XL) oral tablet, extended release: 1 tab(s) orally every 24 hours  carvedilol 6.25 mg oral tablet: orally every 12 hours  digoxin 125 mcg (0.125 mg) oral tablet: 1 tab(s) orally once a day  fenofibrate 145 mg oral tablet: 1 tab(s) orally once a day  Ferrousal 325 mg oral tablet: 1 tab(s) orally every other day (at bedtime)   omeprazole 20 mg oral delayed release capsule: orally once a day, As Needed  Senna 8.6 mg oral tablet: 2 tab(s) orally 2 times a day  simvastatin 10 mg oral tablet: 1 tab(s) orally once a day (at bedtime) W &amp; F  tamsulosin 0.4 mg oral capsule: 1 cap(s) orally once a day  Vitamin D3 1250 mcg (50,000 intl units) oral capsule: 1 cap(s) orally once a week

## 2023-03-30 NOTE — DISCHARGE NOTE PROVIDER - NSDCFUADDAPPT_GEN_ALL_CORE_FT
Please follow up with  Please follow up with Dr. Ford, call the office to make a follow up appointment.

## 2023-03-31 ENCOUNTER — TRANSCRIPTION ENCOUNTER (OUTPATIENT)
Age: 83
End: 2023-03-31

## 2023-03-31 VITALS
RESPIRATION RATE: 17 BRPM | SYSTOLIC BLOOD PRESSURE: 135 MMHG | DIASTOLIC BLOOD PRESSURE: 69 MMHG | OXYGEN SATURATION: 99 % | HEART RATE: 63 BPM | TEMPERATURE: 98 F

## 2023-03-31 LAB
ANION GAP SERPL CALC-SCNC: 7 MMOL/L — SIGNIFICANT CHANGE UP (ref 5–17)
BUN SERPL-MCNC: 21 MG/DL — SIGNIFICANT CHANGE UP (ref 7–23)
CALCIUM SERPL-MCNC: 9.4 MG/DL — SIGNIFICANT CHANGE UP (ref 8.4–10.5)
CHLORIDE SERPL-SCNC: 107 MMOL/L — SIGNIFICANT CHANGE UP (ref 96–108)
CO2 SERPL-SCNC: 24 MMOL/L — SIGNIFICANT CHANGE UP (ref 22–31)
CREAT SERPL-MCNC: 1.37 MG/DL — HIGH (ref 0.5–1.3)
EGFR: 51 ML/MIN/1.73M2 — LOW
GLUCOSE SERPL-MCNC: 118 MG/DL — HIGH (ref 70–99)
HCT VFR BLD CALC: 26.2 % — LOW (ref 39–50)
HGB BLD-MCNC: 8.3 G/DL — LOW (ref 13–17)
MAGNESIUM SERPL-MCNC: 1.8 MG/DL — SIGNIFICANT CHANGE UP (ref 1.6–2.6)
MCHC RBC-ENTMCNC: 26.2 PG — LOW (ref 27–34)
MCHC RBC-ENTMCNC: 31.7 GM/DL — LOW (ref 32–36)
MCV RBC AUTO: 82.6 FL — SIGNIFICANT CHANGE UP (ref 80–100)
NRBC # BLD: 0 /100 WBCS — SIGNIFICANT CHANGE UP (ref 0–0)
PHOSPHATE SERPL-MCNC: 3.1 MG/DL — SIGNIFICANT CHANGE UP (ref 2.5–4.5)
PLATELET # BLD AUTO: 138 K/UL — LOW (ref 150–400)
POTASSIUM SERPL-MCNC: 4.1 MMOL/L — SIGNIFICANT CHANGE UP (ref 3.5–5.3)
POTASSIUM SERPL-SCNC: 4.1 MMOL/L — SIGNIFICANT CHANGE UP (ref 3.5–5.3)
RBC # BLD: 3.17 M/UL — LOW (ref 4.2–5.8)
RBC # FLD: 17.5 % — HIGH (ref 10.3–14.5)
SODIUM SERPL-SCNC: 138 MMOL/L — SIGNIFICANT CHANGE UP (ref 135–145)
WBC # BLD: 5.74 K/UL — SIGNIFICANT CHANGE UP (ref 3.8–10.5)
WBC # FLD AUTO: 5.74 K/UL — SIGNIFICANT CHANGE UP (ref 3.8–10.5)

## 2023-03-31 PROCEDURE — 86901 BLOOD TYPING SEROLOGIC RH(D): CPT

## 2023-03-31 PROCEDURE — 99233 SBSQ HOSP IP/OBS HIGH 50: CPT | Mod: GC

## 2023-03-31 PROCEDURE — 83735 ASSAY OF MAGNESIUM: CPT

## 2023-03-31 PROCEDURE — 85610 PROTHROMBIN TIME: CPT

## 2023-03-31 PROCEDURE — 36415 COLL VENOUS BLD VENIPUNCTURE: CPT

## 2023-03-31 PROCEDURE — 86900 BLOOD TYPING SEROLOGIC ABO: CPT

## 2023-03-31 PROCEDURE — 87635 SARS-COV-2 COVID-19 AMP PRB: CPT

## 2023-03-31 PROCEDURE — 74176 CT ABD & PELVIS W/O CONTRAST: CPT | Mod: MA

## 2023-03-31 PROCEDURE — 86850 RBC ANTIBODY SCREEN: CPT

## 2023-03-31 PROCEDURE — 80048 BASIC METABOLIC PNL TOTAL CA: CPT

## 2023-03-31 PROCEDURE — 99285 EMERGENCY DEPT VISIT HI MDM: CPT

## 2023-03-31 PROCEDURE — 80162 ASSAY OF DIGOXIN TOTAL: CPT

## 2023-03-31 PROCEDURE — 96365 THER/PROPH/DIAG IV INF INIT: CPT

## 2023-03-31 PROCEDURE — 97161 PT EVAL LOW COMPLEX 20 MIN: CPT

## 2023-03-31 PROCEDURE — 87086 URINE CULTURE/COLONY COUNT: CPT

## 2023-03-31 PROCEDURE — 80053 COMPREHEN METABOLIC PANEL: CPT

## 2023-03-31 PROCEDURE — 96366 THER/PROPH/DIAG IV INF ADDON: CPT

## 2023-03-31 PROCEDURE — 85027 COMPLETE CBC AUTOMATED: CPT

## 2023-03-31 PROCEDURE — 36430 TRANSFUSION BLD/BLD COMPNT: CPT

## 2023-03-31 PROCEDURE — 84100 ASSAY OF PHOSPHORUS: CPT

## 2023-03-31 PROCEDURE — 81001 URINALYSIS AUTO W/SCOPE: CPT

## 2023-03-31 PROCEDURE — 85025 COMPLETE CBC W/AUTO DIFF WBC: CPT

## 2023-03-31 PROCEDURE — 85730 THROMBOPLASTIN TIME PARTIAL: CPT

## 2023-03-31 PROCEDURE — P9016: CPT

## 2023-03-31 PROCEDURE — 86923 COMPATIBILITY TEST ELECTRIC: CPT

## 2023-03-31 RX ORDER — BACITRACIN ZINC 500 UNIT/G
1 OINTMENT IN PACKET (EA) TOPICAL ONCE
Refills: 0 | Status: COMPLETED | OUTPATIENT
Start: 2023-03-31 | End: 2023-03-31

## 2023-03-31 RX ADMIN — PANTOPRAZOLE SODIUM 40 MILLIGRAM(S): 20 TABLET, DELAYED RELEASE ORAL at 06:26

## 2023-03-31 RX ADMIN — CARVEDILOL PHOSPHATE 6.25 MILLIGRAM(S): 80 CAPSULE, EXTENDED RELEASE ORAL at 06:27

## 2023-03-31 RX ADMIN — Medication 1 APPLICATION(S): at 13:38

## 2023-03-31 RX ADMIN — BUPROPION HYDROCHLORIDE 300 MILLIGRAM(S): 150 TABLET, EXTENDED RELEASE ORAL at 13:32

## 2023-03-31 RX ADMIN — Medication 100 MILLIGRAM(S): at 13:33

## 2023-03-31 NOTE — PROGRESS NOTE ADULT - SUBJECTIVE AND OBJECTIVE BOX
INTERVAL HPI/OVERNIGHT EVENTS:  No acute events overnight.    VITALS:    T(F): 97.7 (23 @ 04:41), Max: 98.4 (23 @ 20:20)  HR: 63 (23 @ 04:41) (63 - 78)  BP: 136/64 (23 @ 04:41) (127/63 - 160/65)  RR: 17 (23 @ 04:41) (16 - 18)  SpO2: 95% (23 @ 04:41) (95% - 99%)  Wt(kg): --    I&O's Detail    28 Mar 2023 07:01  -  29 Mar 2023 07:00  --------------------------------------------------------  IN:    sodium chloride 0.9%: 375 mL  Total IN: 375 mL    OUT:    Indwelling Catheter - Urethral (mL): 600 mL  Total OUT: 600 mL    Total NET: -225 mL      29 Mar 2023 07:01  -  30 Mar 2023 05:25  --------------------------------------------------------  IN:    Continuous Bladder Irrigation (mL): 93809 mL    Oral Fluid: 1160 mL  Total IN: 22255 mL    OUT:    Continuous Bladder Irrigation (mL): 34109 mL  Total OUT: 00451 mL    Total NET: -390 mL          MEDICATIONS:    ANTIBIOTICS:  cefTRIAXone   IVPB 1000 milliGRAM(s) IV Intermittent every 24 hours      PAIN CONTROL:  acetaminophen     Tablet .. 650 milliGRAM(s) Oral every 6 hours PRN  buPROPion XL (24-Hour) . 300 milliGRAM(s) Oral daily  ondansetron Injectable 4 milliGRAM(s) IV Push every 6 hours PRN       MEDS:  tamsulosin 0.4 milliGRAM(s) Oral at bedtime      HEME/ONC  heparin   Injectable 5000 Unit(s) SubCutaneous every 12 hours        PHYSICAL EXAM:  General: No acute distress.  Alert and Oriented  Abdominal Exam: soft, nt, nd   Exam: 3 way catheter in place, urine pink clear, CBI on      LABS:                        7.9    5.12  )-----------( 146      ( 29 Mar 2023 07:12 )             26.1         139  |  106  |  24<H>  ----------------------------<  111<H>  4.1   |  23  |  1.42<H>    Ca    9.0      29 Mar 2023 07:12  Phos  3.3       Mg     1.6         TPro  6.3  /  Alb  3.7  /  TBili  0.4  /  DBili  x   /  AST  16  /  ALT  9<L>  /  AlkPhos  50      PT/INR - ( 29 Mar 2023 07:39 )   PT: 16.9 sec;   INR: 1.42          PTT - ( 29 Mar 2023 07:39 )  PTT:35.3 sec  Urinalysis Basic - ( 28 Mar 2023 16:42 )    Color: Red / Appearance: Turbid / S.020 / pH: x  Gluc: x / Ketone: NEGATIVE  / Bili: Small / Urobili: 0.2 E.U./dL   Blood: x / Protein: >=300 mg/dL / Nitrite: POSITIVE   Leuk Esterase: NEGATIVE / RBC: Many /HPF / WBC < 5 /HPF   Sq Epi: x / Non Sq Epi: x / Bacteria: Present /HPF    
Patient alfaro catheter exchanged. Patient was irrigated with a 6 eye catheter removing approx 10cc of clots from bladder.  22 fr 3 way hematuria catheter placed using sterile technique.  CBI started patient now draining pink/clear.  
Patient is a 82y old  Male who presents with a chief complaint of Hematuria (31 Mar 2023 05:11)    INTERVAL EVENTS: noted     SUBJECTIVE:  Patient was seen and examined at bedside. still having mild gross hematuria, pink color urine. Denies abd pain, dysuria    Review of systems: No fever, chills, dizziness, HA, Changes in vision, CP, dyspnea, nausea or vomiting, dysuria, changes in bowel movements, LE edema. Rest of 12 point Review of systems negative unless otherwise documented elsewhere in note.     Diet, Regular (03-28-23 @ 21:41) [Active]      MEDICATIONS:  MEDICATIONS  (STANDING):  allopurinol 100 milliGRAM(s) Oral daily  bacitracin   Ointment 1 Application(s) Topical once  buPROPion XL (24-Hour) . 300 milliGRAM(s) Oral daily  carvedilol 6.25 milliGRAM(s) Oral every 12 hours  cefTRIAXone   IVPB 1000 milliGRAM(s) IV Intermittent every 24 hours  heparin   Injectable 5000 Unit(s) SubCutaneous every 12 hours  influenza  Vaccine (HIGH DOSE) 0.7 milliLiter(s) IntraMuscular once  pantoprazole    Tablet 40 milliGRAM(s) Oral before breakfast  polyethylene glycol 3350 17 Gram(s) Oral daily  senna 2 Tablet(s) Oral at bedtime  simvastatin 10 milliGRAM(s) Oral at bedtime  tamsulosin 0.4 milliGRAM(s) Oral at bedtime    MEDICATIONS  (PRN):  acetaminophen     Tablet .. 650 milliGRAM(s) Oral every 6 hours PRN Temp greater or equal to 38C (100.4F), Mild Pain (1 - 3)  bisacodyl 5 milliGRAM(s) Oral every 12 hours PRN Constipation  ondansetron Injectable 4 milliGRAM(s) IV Push every 6 hours PRN Nausea and/or Vomiting      Allergies    No Known Allergies    Intolerances        OBJECTIVE:  Vital Signs Last 24 Hrs  T(C): 36.7 (31 Mar 2023 12:47), Max: 37.2 (31 Mar 2023 05:09)  T(F): 98 (31 Mar 2023 12:47), Max: 99 (31 Mar 2023 05:09)  HR: 63 (31 Mar 2023 12:47) (55 - 83)  BP: 135/69 (31 Mar 2023 12:47) (119/67 - 151/77)  BP(mean): --  RR: 17 (31 Mar 2023 12:47) (16 - 17)  SpO2: 99% (31 Mar 2023 12:47) (95% - 99%)    Parameters below as of 31 Mar 2023 12:47  Patient On (Oxygen Delivery Method): room air      I&O's Summary    30 Mar 2023 07:01  -  31 Mar 2023 07:00  --------------------------------------------------------  IN: 1120 mL / OUT: 1800 mL / NET: -680 mL    31 Mar 2023 07:01  -  31 Mar 2023 12:51  --------------------------------------------------------  IN: 300 mL / OUT: 100 mL / NET: 200 mL        PHYSICAL EXAM:  Gen: Reclining in bed at time of exam, appears stated age  HEENT: NCAT, MMM, clear OP  Neck: supple, trachea at midline  CV: RRR, +S1/S2  Pulm: adequate respiratory effort, no increase in work of breathing  Abd: soft, NTND  Skin: warm and dry,   Ext: WWP, no LE edema  Neuro: AOx3, no gross focal neurological deficits  Psych: affect and behavior appropriate, pleasant at time of interview  :     LABS:                        8.3    5.74  )-----------( 138      ( 31 Mar 2023 06:41 )             26.2     03-31    138  |  107  |  21  ----------------------------<  118<H>  4.1   |  24  |  1.37<H>    Ca    9.4      31 Mar 2023 06:41  Phos  3.1     03-31  Mg     1.8     03-31          CAPILLARY BLOOD GLUCOSE            MICRODATA:    Culture - Urine (collected 28 Mar 2023 21:10)  Source: .Urine None  Final Report (30 Mar 2023 11:37):    No growth    Urinalysis with Rflx Culture (collected 28 Mar 2023 16:42)        RADIOLOGY/OTHER STUDIES:          
OVERNIGHT EVENTS: None    SUBJECTIVE / INTERVAL HPI: Patient seen and examined at bedside. Denies f/c, n/v/d/c, chest pain, shortness of breath, abdominal pain.    VITAL SIGNS:  Vital Signs Last 24 Hrs  T(C): 36.4 (30 Mar 2023 12:59), Max: 36.9 (29 Mar 2023 20:20)  T(F): 97.6 (30 Mar 2023 12:59), Max: 98.4 (29 Mar 2023 20:20)  HR: 67 (30 Mar 2023 12:59) (63 - 80)  BP: 147/73 (30 Mar 2023 12:59) (127/63 - 147/73)  BP(mean): --  RR: 17 (30 Mar 2023 12:59) (16 - 17)  SpO2: 99% (30 Mar 2023 12:59) (95% - 99%)    Parameters below as of 30 Mar 2023 12:59  Patient On (Oxygen Delivery Method): room air      I&O's Summary    29 Mar 2023 07:01  -  30 Mar 2023 07:00  --------------------------------------------------------  IN: 29120 mL / OUT: 30800 mL / NET: -440 mL    30 Mar 2023 07:01  -  30 Mar 2023 16:02  --------------------------------------------------------  IN: 1120 mL / OUT: 550 mL / NET: 570 mL        PHYSICAL EXAM:    General: NAD  HEENT: NC/AT; PERRL, anicteric sclera; MMM  Neck: supple  Cardiovascular: +S1/S2; RRR  Respiratory: CTA B/L; no W/R/R  Gastrointestinal: soft, NT/ND; +BSx4  Extremities: WWP; no edema, clubbing or cyanosis  Vascular: 2+ radial, DP/PT pulses B/L  Neurological: AAOx3; no focal deficits    MEDICATIONS:  MEDICATIONS  (STANDING):  allopurinol 100 milliGRAM(s) Oral daily  buPROPion XL (24-Hour) . 300 milliGRAM(s) Oral daily  carvedilol 6.25 milliGRAM(s) Oral every 12 hours  cefTRIAXone   IVPB 1000 milliGRAM(s) IV Intermittent every 24 hours  heparin   Injectable 5000 Unit(s) SubCutaneous every 12 hours  influenza  Vaccine (HIGH DOSE) 0.7 milliLiter(s) IntraMuscular once  pantoprazole    Tablet 40 milliGRAM(s) Oral before breakfast  polyethylene glycol 3350 17 Gram(s) Oral daily  senna 2 Tablet(s) Oral at bedtime  simvastatin 10 milliGRAM(s) Oral at bedtime  tamsulosin 0.4 milliGRAM(s) Oral at bedtime    MEDICATIONS  (PRN):  acetaminophen     Tablet .. 650 milliGRAM(s) Oral every 6 hours PRN Temp greater or equal to 38C (100.4F), Mild Pain (1 - 3)  bisacodyl 5 milliGRAM(s) Oral every 12 hours PRN Constipation  ondansetron Injectable 4 milliGRAM(s) IV Push every 6 hours PRN Nausea and/or Vomiting      ALLERGIES:  Allergies    No Known Allergies    Intolerances        LABS:                        7.6    6.13  )-----------( 142      ( 30 Mar 2023 08:50 )             25.0     03-30    138  |  105  |  20  ----------------------------<  107<H>  3.8   |  25  |  1.34<H>    Ca    8.9      30 Mar 2023 08:50  Phos  3.3     03-30  Mg     1.8     03-30    TPro  6.3  /  Alb  3.7  /  TBili  0.4  /  DBili  x   /  AST  16  /  ALT  9<L>  /  AlkPhos  50  03-29    PT/INR - ( 29 Mar 2023 07:39 )   PT: 16.9 sec;   INR: 1.42          PTT - ( 29 Mar 2023 07:39 )  PTT:35.3 sec  Urinalysis Basic - ( 28 Mar 2023 16:42 )    Color: Red / Appearance: Turbid / S.020 / pH: x  Gluc: x / Ketone: NEGATIVE  / Bili: Small / Urobili: 0.2 E.U./dL   Blood: x / Protein: >=300 mg/dL / Nitrite: POSITIVE   Leuk Esterase: NEGATIVE / RBC: Many /HPF / WBC < 5 /HPF   Sq Epi: x / Non Sq Epi: x / Bacteria: Present /HPF      CAPILLARY BLOOD GLUCOSE          RADIOLOGY & ADDITIONAL TESTS: Reviewed.  
INTERVAL HPI/OVERNIGHT EVENTS:  No acute events overnight.    VITALS:    T(F): 98.4 (03-30-23 @ 21:17), Max: 98.4 (03-30-23 @ 21:17)  HR: 68 (03-30-23 @ 21:17) (67 - 83)  BP: 134/66 (03-30-23 @ 21:17) (127/84 - 151/77)  RR: 16 (03-30-23 @ 21:17) (16 - 17)  SpO2: 95% (03-30-23 @ 21:17) (95% - 99%)  Wt(kg): --    I&O's Detail    29 Mar 2023 07:01  -  30 Mar 2023 07:00  --------------------------------------------------------  IN:    Continuous Bladder Irrigation (mL): 06543 mL    Oral Fluid: 1310 mL  Total IN: 13427 mL    OUT:    Continuous Bladder Irrigation (mL): 68621 mL    Indwelling Catheter - Urethral (mL): 200 mL  Total OUT: 34849 mL    Total NET: -440 mL      30 Mar 2023 07:01  -  31 Mar 2023 05:11  --------------------------------------------------------  IN:    Oral Fluid: 1120 mL  Total IN: 1120 mL    OUT:    Indwelling Catheter - Urethral (mL): 550 mL  Total OUT: 550 mL    Total NET: 570 mL          MEDICATIONS:    ANTIBIOTICS:  cefTRIAXone   IVPB 1000 milliGRAM(s) IV Intermittent every 24 hours      PAIN CONTROL:  acetaminophen     Tablet .. 650 milliGRAM(s) Oral every 6 hours PRN  buPROPion XL (24-Hour) . 300 milliGRAM(s) Oral daily  ondansetron Injectable 4 milliGRAM(s) IV Push every 6 hours PRN       MEDS:  tamsulosin 0.4 milliGRAM(s) Oral at bedtime      HEME/ONC  heparin   Injectable 5000 Unit(s) SubCutaneous every 12 hours        PHYSICAL EXAM:  General: No acute distress.  Alert and Oriented  Abdominal Exam: soft, NT, ND   Exam: FC intact, urine light pink      LABS:                        7.6    6.13  )-----------( 142      ( 30 Mar 2023 08:50 )             25.0     03-30    138  |  105  |  20  ----------------------------<  107<H>  3.8   |  25  |  1.34<H>    Ca    8.9      30 Mar 2023 08:50  Phos  3.3     03-30  Mg     1.8     03-30    TPro  6.3  /  Alb  3.7  /  TBili  0.4  /  DBili  x   /  AST  16  /  ALT  9<L>  /  AlkPhos  50  03-29    PT/INR - ( 29 Mar 2023 07:39 )   PT: 16.9 sec;   INR: 1.42          PTT - ( 29 Mar 2023 07:39 )  PTT:35.3 sec      RADIOLOGY & ADDITIONAL TESTS:       
OVERNIGHT EVENTS: None    SUBJECTIVE / INTERVAL HPI: Patient seen and examined at bedside. Denies f/c, n/v/d/c, chest pain, shortness of breath, abdominal pain.    VITAL SIGNS:  Vital Signs Last 24 Hrs  T(C): 36.3 (29 Mar 2023 13:06), Max: 36.9 (28 Mar 2023 21:28)  T(F): 97.3 (29 Mar 2023 13:06), Max: 98.5 (29 Mar 2023 02:09)  HR: 72 (29 Mar 2023 13:06) (56 - 78)  BP: 144/74 (29 Mar 2023 13:06) (130/75 - 160/65)  BP(mean): --  RR: 16 (29 Mar 2023 13:06) (16 - 18)  SpO2: 99% (29 Mar 2023 13:06) (97% - 99%)    Parameters below as of 29 Mar 2023 13:06  Patient On (Oxygen Delivery Method): room air      I&O's Summary    28 Mar 2023 07:01  -  29 Mar 2023 07:00  --------------------------------------------------------  IN: 375 mL / OUT: 600 mL / NET: -225 mL    29 Mar 2023 07:01  -  29 Mar 2023 16:35  --------------------------------------------------------  IN: 5980 mL / OUT: 3700 mL / NET: 2280 mL        PHYSICAL EXAM:    General: NAD  HEENT: NC/AT; PERRL, anicteric sclera; MMM  Neck: supple  Cardiovascular: +S1/S2; RRR  Respiratory: CTA B/L; no W/R/R  Gastrointestinal: soft, NT/ND; +BSx4  Extremities: WWP; no edema, clubbing or cyanosis  Vascular: 2+ radial, DP/PT pulses B/L  Neurological: AAOx3; no focal deficits    MEDICATIONS:  MEDICATIONS  (STANDING):  allopurinol 100 milliGRAM(s) Oral daily  buPROPion XL (24-Hour) . 300 milliGRAM(s) Oral daily  cefTRIAXone   IVPB 1000 milliGRAM(s) IV Intermittent every 24 hours  heparin   Injectable 5000 Unit(s) SubCutaneous every 12 hours  influenza  Vaccine (HIGH DOSE) 0.7 milliLiter(s) IntraMuscular once  pantoprazole    Tablet 40 milliGRAM(s) Oral before breakfast  polyethylene glycol 3350 17 Gram(s) Oral daily  senna 2 Tablet(s) Oral at bedtime  simvastatin 10 milliGRAM(s) Oral at bedtime  sodium chloride 0.9%. 1000 milliLiter(s) (75 mL/Hr) IV Continuous <Continuous>  tamsulosin 0.4 milliGRAM(s) Oral at bedtime    MEDICATIONS  (PRN):  acetaminophen     Tablet .. 650 milliGRAM(s) Oral every 6 hours PRN Temp greater or equal to 38C (100.4F), Mild Pain (1 - 3)  bisacodyl 5 milliGRAM(s) Oral every 12 hours PRN Constipation  ondansetron Injectable 4 milliGRAM(s) IV Push every 6 hours PRN Nausea and/or Vomiting      ALLERGIES:  Allergies    No Known Allergies    Intolerances        LABS:                        7.9    5.12  )-----------( 146      ( 29 Mar 2023 07:12 )             26.1         139  |  106  |  24<H>  ----------------------------<  111<H>  4.1   |  23  |  1.42<H>    Ca    9.0      29 Mar 2023 07:12  Phos  3.3       Mg     1.6         TPro  6.3  /  Alb  3.7  /  TBili  0.4  /  DBili  x   /  AST  16  /  ALT  9<L>  /  AlkPhos  50      PT/INR - ( 29 Mar 2023 07:39 )   PT: 16.9 sec;   INR: 1.42          PTT - ( 29 Mar 2023 07:39 )  PTT:35.3 sec  Urinalysis Basic - ( 28 Mar 2023 16:42 )    Color: Red / Appearance: Turbid / S.020 / pH: x  Gluc: x / Ketone: NEGATIVE  / Bili: Small / Urobili: 0.2 E.U./dL   Blood: x / Protein: >=300 mg/dL / Nitrite: POSITIVE   Leuk Esterase: NEGATIVE / RBC: Many /HPF / WBC < 5 /HPF   Sq Epi: x / Non Sq Epi: x / Bacteria: Present /HPF      CAPILLARY BLOOD GLUCOSE          RADIOLOGY & ADDITIONAL TESTS: Reviewed.  
INTERVAL HPI/OVERNIGHT EVENTS:  No acute events overnight.    VITALS:    T(F): 98.5 (23 @ 02:09), Max: 98.6 (23 @ 15:33)  HR: 56 (23 @ 02:09) (56 - 86)  BP: 149/64 (23 @ 02:09) (126/68 - 149/64)  RR: 16 (23 @ 02:09) (16 - 18)  SpO2: 98% (23 @ 02:09) (98% - 99%)  Wt(kg): --    I&O's Detail    28 Mar 2023 07:01  -  29 Mar 2023 05:30  --------------------------------------------------------  IN:    sodium chloride 0.9%: 375 mL  Total IN: 375 mL    OUT:    Indwelling Catheter - Urethral (mL): 600 mL  Total OUT: 600 mL    Total NET: -225 mL          MEDICATIONS:    ANTIBIOTICS:  cefTRIAXone   IVPB 1000 milliGRAM(s) IV Intermittent every 24 hours      PAIN CONTROL:  acetaminophen     Tablet .. 650 milliGRAM(s) Oral every 6 hours PRN  buPROPion XL (24-Hour) . 300 milliGRAM(s) Oral daily  ondansetron Injectable 4 milliGRAM(s) IV Push every 6 hours PRN       MEDS:  tamsulosin 0.4 milliGRAM(s) Oral at bedtime      HEME/ONC        PHYSICAL EXAM:  General: No acute distress.  Alert and Oriented  Abdominal Exam: soft, nt, nd   Exam: Doshi catheter in place, hematuria      LABS:                        9.1    5.26  )-----------( 178      ( 28 Mar 2023 19:48 )             29.5         143  |  108  |  25<H>  ----------------------------<  115<H>  4.8   |  27  |  1.55<H>    Ca    9.2      28 Mar 2023 16:42    TPro  7.1  /  Alb  3.8  /  TBili  0.5  /  DBili  x   /  AST  21  /  ALT  11  /  AlkPhos  59  -      Urinalysis Basic - ( 28 Mar 2023 16:42 )    Color: Red / Appearance: Turbid / S.020 / pH: x  Gluc: x / Ketone: NEGATIVE  / Bili: Small / Urobili: 0.2 E.U./dL   Blood: x / Protein: >=300 mg/dL / Nitrite: POSITIVE   Leuk Esterase: NEGATIVE / RBC: Many /HPF / WBC < 5 /HPF   Sq Epi: x / Non Sq Epi: x / Bacteria: Present /HPF

## 2023-03-31 NOTE — PROGRESS NOTE ADULT - ASSESSMENT
81 y/o male w/ hx A-Fib on Eliquis, HTN, hyperlipidemia, chronic gout, depression, kidney cancer s/p L nephrectomy admitted for hematuria. Currently off CBI. S/P 1U PRBC's 3/30.     81 y/o male w/ hx A-Fib on Eliquis, HTN, hyperlipidemia, chronic gout, depression, kidney cancer s/p L nephrectomy admitted for hematuria in setting of doubling eliquis and possible UTI. Currently off CBI and urine clear pink. S/P 1U PRBC's 3/30.

## 2023-03-31 NOTE — PROGRESS NOTE ADULT - ASSESSMENT
pt seen and examined with Dr. Veronica.Denies SOB,CP, abd pain,etc. alfaro in clearing pink colored urine     IRIR rate controlled  good air entry bilaterally  bs present, soft, nt, nd  alfaro in place- CBI on going, dark pink urine  varicose vein in leg, no edema noted.      A/P: 81 y/o male w/ hx A-Fib on Eliquis, HTN, hyperlipidemia, chronic gout, depression, kidney cancer s/p L nephrectomy p/w 2 episodes of gross hematuria likely iatrogenic 2/2 to double dose of AC at home- currently on CBI- urine is dark pink, no other area of bleeding, Hb drop from 9 to 7.9 -> 8.3( 3/31) , platelet 146, cr 1.5--> 1.4-> 1.37 (3/31) . CT left nephrectomy, right kidney no stone-    - Afib on eliquis 9 CHADVASC score 3, 3.2% yearly stroke risk, HASBLED score 2, intermediate risk 1.8-3.2%) - would continue to hold Eliquis for at least another 48 hours, agrees to restart Eliquis on Monday 4/3    - Acute blood loss anemia - keep type and screen -favor to keep hb above 8 given active bleeding and acute drop in patient with cardiac history. on ceftriaxone/ flomax.   - Gross hematuria- management per    - HTN - BP stable /can continue coreg with holding parameter ( hold for SBP <100, HR <60s )   heart rate still in 60s-   to reassess in am to determine to restart digoxin -    Thank you for allowing to participate in the care , dw Dr. Veronica.

## 2023-03-31 NOTE — PHYSICAL THERAPY INITIAL EVALUATION ADULT - ADDITIONAL COMMENTS
As per pt, Pt lives in an assisted living facility, has home attendant 3 days, 5hrs to assist with cooking, cleaning, etc. Pt uses a rollator at baseline

## 2023-03-31 NOTE — DISCHARGE NOTE NURSING/CASE MANAGEMENT/SOCIAL WORK - NSDCPEFALRISK_GEN_ALL_CORE
For information on Fall & Injury Prevention, visit: [email or web address] OR  [email or web address] OR  https://www.cdc.gov/steadi/patient.html

## 2023-03-31 NOTE — PHYSICAL THERAPY INITIAL EVALUATION ADULT - PERTINENT HX OF CURRENT PROBLEM, REHAB EVAL
83 y/o male w/ hx A-Fib on Eliquis, HTN, hyperlipidemia, chronic gout, depression, kidney cancer s/p L nephrectomy p/w 2 episodes of gross hematuria since this afternoon.  Notes felt like he had to try harder to urinate today, otherwise feels well.  Denies dizziness, f/c, cp, sob, abd pain, n/v/d, dysuria, flank/ back pain.     Note: As discussed above. Patient reports that he believes he took too much Eliquis this morning and believes he accidentally doubled his dose. He reports to onset of hematuria shortly after with no associated dysuria or pain. He reports to no previous bouts of hematuria. His L nephrectomy occurred in [place] in 2008 and he has not followed up with  since. Clots appreciated in urinal bedside. Patient irrigated bedside with 22 6eye catheter with minimal clot expulsion and clearing of urine to pink clear. 18fr coude placed using sterile method for further urine monitoring and PRN irrigation. Tolerated procedure well.  Shortly after irrigation urine became more dark pink/light punch

## 2023-03-31 NOTE — PROGRESS NOTE ADULT - PROBLEM SELECTOR PLAN 1
-stable  -OOB  -SCD's, IS  -f/u labs  -f/u cultures  -medicine to f/u  -off AC  -continue present care -stable  -OOB  -SCD's, IS  -f/u labs  -f/u cultures  -medicine to f/u -- will discus time off eliquis  -off AC  -continue present care  -likely void trial today

## 2023-03-31 NOTE — DISCHARGE NOTE NURSING/CASE MANAGEMENT/SOCIAL WORK - NSDCVIVACCINE_GEN_ALL_CORE_FT
influenza, high-dose, quadrivalent; 22-Oct-2021 18:30; Dariela Rae (RN); [employer]; [id number] (Exp. Date: 30-Jun-2022); IntraMuscular; Deltoid Left.; 0.7 milliLiter(s); VIS (VIS Published: 06-Aug-2021, VIS Presented: 22-Oct-2021);

## 2023-04-04 ENCOUNTER — NON-APPOINTMENT (OUTPATIENT)
Age: 83
End: 2023-04-04

## 2023-04-05 DIAGNOSIS — K59.09 OTHER CONSTIPATION: ICD-10-CM

## 2023-04-05 DIAGNOSIS — D50.9 IRON DEFICIENCY ANEMIA, UNSPECIFIED: ICD-10-CM

## 2023-04-05 DIAGNOSIS — Z85.528 PERSONAL HISTORY OF OTHER MALIGNANT NEOPLASM OF KIDNEY: ICD-10-CM

## 2023-04-05 DIAGNOSIS — E78.5 HYPERLIPIDEMIA, UNSPECIFIED: ICD-10-CM

## 2023-04-05 DIAGNOSIS — Z79.899 OTHER LONG TERM (CURRENT) DRUG THERAPY: ICD-10-CM

## 2023-04-05 DIAGNOSIS — M1A.9XX0 CHRONIC GOUT, UNSPECIFIED, WITHOUT TOPHUS (TOPHI): ICD-10-CM

## 2023-04-05 DIAGNOSIS — I10 ESSENTIAL (PRIMARY) HYPERTENSION: ICD-10-CM

## 2023-04-05 DIAGNOSIS — Z90.5 ACQUIRED ABSENCE OF KIDNEY: ICD-10-CM
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
## 2023-04-05 DIAGNOSIS — Z79.01 LONG TERM (CURRENT) USE OF ANTICOAGULANTS: ICD-10-CM

## 2023-04-05 DIAGNOSIS — F41.9 ANXIETY DISORDER, UNSPECIFIED: ICD-10-CM

## 2023-04-05 DIAGNOSIS — R31.9 HEMATURIA, UNSPECIFIED: ICD-10-CM

## 2023-04-05 DIAGNOSIS — R31.0 GROSS HEMATURIA: ICD-10-CM

## 2023-04-05 DIAGNOSIS — D62 ACUTE POSTHEMORRHAGIC ANEMIA: ICD-10-CM

## 2023-04-05 DIAGNOSIS — K21.9 GASTRO-ESOPHAGEAL REFLUX DISEASE WITHOUT ESOPHAGITIS: ICD-10-CM

## 2023-04-05 DIAGNOSIS — I48.20 CHRONIC ATRIAL FIBRILLATION, UNSPECIFIED: ICD-10-CM

## 2023-04-05 DIAGNOSIS — T45.511A POISONING BY ANTICOAGULANTS, ACCIDENTAL (UNINTENTIONAL), INITIAL ENCOUNTER: ICD-10-CM

## 2023-04-05 DIAGNOSIS — N39.0 URINARY TRACT INFECTION, SITE NOT SPECIFIED: ICD-10-CM

## 2023-04-05 DIAGNOSIS — Z20.822 CONTACT WITH AND (SUSPECTED) EXPOSURE TO COVID-19: ICD-10-CM

## 2023-04-05 DIAGNOSIS — F32.A DEPRESSION, UNSPECIFIED: ICD-10-CM

## 2023-04-07 ENCOUNTER — NON-APPOINTMENT (OUTPATIENT)
Age: 83
End: 2023-04-07

## 2023-06-10 NOTE — CONSULT NOTE ADULT - PROBLEM SELECTOR RECOMMENDATION 5
Pt reporting hx of chronic constipation, last bowel movement was yesterday evening which he reports as small and incomplete.    Recommend:  - Senna, miralax qd  - Consider dulcolax suppository if pt amenable
11-Jun-2023 04:54

## 2023-07-31 NOTE — ED ADULT NURSE NOTE - NS PRO PASSIVE SMOKE EXP
"Subjective:      Patient ID: Kristopher Schmidt is a 25 y.o. male.    Vitals:  height is 6' 2" (1.88 m) and weight is 102.1 kg (225 lb). His temperature is 98.3 °F (36.8 °C). His blood pressure is 130/85 and his pulse is 63. His respiration is 20 and oxygen saturation is 100%.     Chief Complaint: Sinus Problem (Pt symptoms started last week Monday with stomach ache, fatigue, body aches, chills, fever. Pt tested positive for Covid Tuesday. )    HPI  patient reports in the last 5-6 days having acute fatigue body aches chills fever with positive home COVID test states having persistent generalized malaise today not improving as quickly as other family members COVID positive prior to patient's onset of symptoms.   Sinus Problem     Additional comments: Pt symptoms started last week Monday with stomach   ache, fatigue, body aches, chills, fever. Pt tested positive for Covid   Tuesday.     Sinus Problem  This is a new problem. Associated symptoms include chills, congestion, coughing, headaches and sinus pressure. Pertinent negatives include no ear pain, neck pain or shortness of breath.       Constitution: Positive for chills, fatigue and fever.   HENT:  Positive for congestion and sinus pressure. Negative for ear pain, sinus pain, trouble swallowing and voice change.    Neck: Negative for neck pain and neck stiffness.   Cardiovascular: Negative.    Respiratory:  Positive for cough. Negative for shortness of breath and wheezing.    Gastrointestinal:  Positive for nausea. Negative for vomiting and diarrhea.   Musculoskeletal:  Positive for muscle ache.   Skin: Negative.  Negative for erythema.   Allergic/Immunologic: Negative.    Neurological:  Positive for headaches. Negative for dizziness and passing out.      Objective:     Physical Exam   Constitutional: He is oriented to person, place, and time. He appears well-developed. He is cooperative.  Non-toxic appearance.      Comments:Awake alert pleasant ambulatory fatigue " male     HENT:   Head: Normocephalic.   Ears:   Right Ear: Hearing, tympanic membrane, external ear and ear canal normal.   Left Ear: Hearing, tympanic membrane, external ear and ear canal normal.   Nose: Congestion present. No mucosal edema, rhinorrhea or nasal deformity. No epistaxis. Right sinus exhibits no maxillary sinus tenderness and no frontal sinus tenderness. Left sinus exhibits no maxillary sinus tenderness and no frontal sinus tenderness.      Comments: Mild  Mouth/Throat: Uvula is midline, oropharynx is clear and moist and mucous membranes are normal. Mucous membranes are moist. No trismus in the jaw. Normal dentition. No uvula swelling. No oropharyngeal exudate, posterior oropharyngeal edema or posterior oropharyngeal erythema.   Eyes: Conjunctivae and lids are normal. No scleral icterus.   Neck: Trachea normal and phonation normal. Neck supple. No edema present. No erythema present. No neck rigidity present.   Cardiovascular: Normal rate, regular rhythm, normal heart sounds and normal pulses.   No murmur heard.Exam reveals no gallop.   Pulmonary/Chest: Effort normal and breath sounds normal. No stridor. No respiratory distress. He has no decreased breath sounds. He has no wheezes. He has no rhonchi. He has no rales.   Abdominal: He exhibits no distension. Soft. flat abdomen There is no abdominal tenderness. There is no guarding.   Musculoskeletal: Normal range of motion.         General: No swelling. Normal range of motion.      Cervical back: He exhibits no tenderness.   Lymphadenopathy:     He has no cervical adenopathy.   Neurological: no focal deficit. He is alert and oriented to person, place, and time. He exhibits normal muscle tone.   Skin: Skin is warm, dry, intact, not diaphoretic, not pale and no rash. No erythema   Psychiatric: His speech is normal and behavior is normal. Mood, judgment and thought content normal.   Nursing note and vitals reviewed.       Previous History      Review of  "patient's allergies indicates:  No Known Allergies    Past Medical History:   Diagnosis Date    Known health problems: none      Current Outpatient Medications   Medication Instructions    methylPREDNISolone (MEDROL DOSEPACK) 4 mg tablet use as directed    molnupiravir 800 mg, Oral, Every 12 hours    promethazine-dextromethorphan (PROMETHAZINE-DM) 6.25-15 mg/5 mL Syrp 5 mLs, Oral, Every 8 hours PRN     Past Surgical History:   Procedure Laterality Date    NO PAST SURGERIES       Family History   Problem Relation Age of Onset    No Known Problems Mother     No Known Problems Father     No Known Problems Sister     No Known Problems Brother        Social History     Tobacco Use    Smoking status: Some Days     Current packs/day: 0.00     Types: Vaping with nicotine    Smokeless tobacco: Never   Substance Use Topics    Alcohol use: Not Currently    Drug use: Not Currently        Physical Exam      Vital Signs Reviewed   /85   Pulse 63   Temp 98.3 °F (36.8 °C)   Resp 20   Ht 6' 2" (1.88 m)   Wt 102.1 kg (225 lb)   SpO2 100%   BMI 28.89 kg/m²        Procedures    Procedures     Labs   No results found for this or any previous visit.      Assessment:     1. COVID        Plan:     COVID Positive  Start multi vitamin daily. Current CDC guidelines recommend that you quarantine for 5 days starting the day after your symptoms began. Quarantine can end after 5 days as long as the last 24 hours of quarantine you are fever free and there is improvement of all your symptoms. Wear a mask around others for 5 additional days after quarantine. Treat your symptoms as you would the common cold. If you live with anybody, isolate yourself in a separate bedroom and use a separate bathroom. If your symptoms worsen or you develop shortness of breath or a fever over 103, seek medical attention immediately.  Start Molnupiravir today to help reduce viral sick time.  Phenergan DM if needed for cough congestion nausea vomiting body " aches and rest.  Do not take sedating medication drive or operate machinery.  May start steroid Dosepak in 1-2 days after steroid injection received today to help reduce pain and inflammation.  Recommend follow-up with primary care physician in 1 week for re-evaluation if not improving.  Recommended emergency department evaluation sooner if respiratory symptoms worsen.  COVID    Other orders  -     dexAMETHasone injection 8 mg  -     molnupiravir 200 mg capsule (EUA); Take 4 capsules (800 mg total) by mouth every 12 (twelve) hours. for 5 days  Dispense: 40 capsule; Refill: 0  -     promethazine-dextromethorphan (PROMETHAZINE-DM) 6.25-15 mg/5 mL Syrp; Take 5 mLs by mouth every 8 (eight) hours as needed (cough).  Dispense: 118 mL; Refill: 0  -     methylPREDNISolone (MEDROL DOSEPACK) 4 mg tablet; use as directed  Dispense: 1 each; Refill: 0                     Unknown

## 2024-03-01 NOTE — DISCHARGE NOTE NURSING/CASE MANAGEMENT/SOCIAL WORK - NSDCPETBCESMAN_GEN_ALL_CORE
If you are a smoker, it is important for your health to stop smoking. Please be aware that second hand smoke is also harmful. n/a

## 2024-07-06 NOTE — ED ADULT TRIAGE NOTE - PAIN RATING/NUMBER SCALE (0-10): ACTIVITY
Please drink plenty of fluids. Please get plenty of rest.    Please return here or go to the Emergency Department for any concerns or worsening of condition.    Please take over the counter Pepcid or Zantac as directed for the next 24-72hours as needed.    If you were given a steroid shot in the clinic and have also been given a prescription for a steroid such as Prednisone or a Medrol Dose Pack, please begin taking them tomorrow. If you received a steroid shot today - this can elevate your blood pressure, elevate your blood sugar, water weight gain, nervous energy, redness to the face and dimpling of the skin where the shot goes in.     If you have a localized reaction it is ok to apply OTC  topical creams (e.g. Cortaid) as directed to the affected area. You can also use a cool compress to reduce itching.     Please take Claritin or Zyrtec or Allegra (24 hours) twice a day.  You can add Benadryl or Hydroxyzine as needed for itching, however these may make you drowsy, so do not  drive or operate heavy equipment or machinery while taking these medications.      In the future make sure to keep benadryl with you or an Epi-pen if you were prescribed one.               0

## (undated) DEVICE — NET RETRIEVAL RESCUENET 30MM

## (undated) DEVICE — Device

## (undated) DEVICE — SNARE CAPTIVATOR II 20MM

## (undated) DEVICE — ELCTR GROUNDING PAD ADULT COVIDIEN